# Patient Record
Sex: FEMALE | Race: OTHER | Employment: FULL TIME | URBAN - NONMETROPOLITAN AREA
[De-identification: names, ages, dates, MRNs, and addresses within clinical notes are randomized per-mention and may not be internally consistent; named-entity substitution may affect disease eponyms.]

---

## 2017-08-10 ENCOUNTER — OFFICE VISIT (OUTPATIENT)
Dept: NEUROLOGY | Age: 37
End: 2017-08-10
Payer: OTHER GOVERNMENT

## 2017-08-10 VITALS
HEART RATE: 73 BPM | OXYGEN SATURATION: 100 % | DIASTOLIC BLOOD PRESSURE: 80 MMHG | SYSTOLIC BLOOD PRESSURE: 114 MMHG | HEIGHT: 60 IN | WEIGHT: 131 LBS | BODY MASS INDEX: 25.72 KG/M2

## 2017-08-10 DIAGNOSIS — G43.019 INTRACTABLE MIGRAINE WITHOUT AURA AND WITHOUT STATUS MIGRAINOSUS: Primary | ICD-10-CM

## 2017-08-10 DIAGNOSIS — G43.719 CHRONIC MIGRAINE WITHOUT AURA, INTRACTABLE, WITHOUT STATUS MIGRAINOSUS: ICD-10-CM

## 2017-08-10 DIAGNOSIS — G47.61 PERIODIC LIMB MOVEMENT DISORDER (PLMD): ICD-10-CM

## 2017-08-10 DIAGNOSIS — R06.83 SNORING: ICD-10-CM

## 2017-08-10 PROCEDURE — 99204 OFFICE O/P NEW MOD 45 MIN: CPT | Performed by: PHYSICIAN ASSISTANT

## 2017-08-10 RX ORDER — TOPIRAMATE 100 MG/1
100 TABLET, FILM COATED ORAL 2 TIMES DAILY
Qty: 180 TABLET | Refills: 3 | Status: SHIPPED | OUTPATIENT
Start: 2017-08-10 | End: 2017-09-11 | Stop reason: DRUGHIGH

## 2017-08-10 RX ORDER — TOPIRAMATE 100 MG/1
100 TABLET, FILM COATED ORAL NIGHTLY
COMMUNITY
End: 2017-08-10 | Stop reason: DRUGHIGH

## 2017-08-10 RX ORDER — GLYCOPYRROLATE 2 MG/1
2 TABLET ORAL 2 TIMES DAILY
COMMUNITY

## 2017-08-10 RX ORDER — LORAZEPAM 1 MG/1
1 TABLET ORAL EVERY 8 HOURS PRN
COMMUNITY
End: 2018-09-12 | Stop reason: SDUPTHER

## 2017-08-10 RX ORDER — BUTALBITAL, ACETAMINOPHEN AND CAFFEINE 50; 325; 40 MG/1; MG/1; MG/1
1 TABLET ORAL PRN
COMMUNITY
End: 2018-03-01 | Stop reason: ALTCHOICE

## 2017-08-10 RX ORDER — FLUTICASONE PROPIONATE 50 MCG
2 SPRAY, SUSPENSION (ML) NASAL DAILY PRN
COMMUNITY
End: 2018-03-01 | Stop reason: ALTCHOICE

## 2017-08-10 RX ORDER — RIZATRIPTAN BENZOATE 10 MG/1
TABLET, ORALLY DISINTEGRATING ORAL
Qty: 9 TABLET | Refills: 3 | Status: SHIPPED | OUTPATIENT
Start: 2017-08-10 | End: 2017-11-17

## 2017-08-10 RX ORDER — NORGESTIMATE AND ETHINYL ESTRADIOL 0.25-0.035
1 KIT ORAL DAILY
COMMUNITY
End: 2017-08-10

## 2017-08-10 RX ORDER — LUBIPROSTONE 24 UG/1
24 CAPSULE, GELATIN COATED ORAL 2 TIMES DAILY
COMMUNITY
End: 2018-03-01 | Stop reason: ALTCHOICE

## 2017-08-10 RX ORDER — DESVENLAFAXINE 100 MG/1
TABLET, EXTENDED RELEASE ORAL DAILY
COMMUNITY
End: 2018-12-13 | Stop reason: SDUPTHER

## 2017-08-10 RX ORDER — ESOMEPRAZOLE MAGNESIUM 40 MG/1
40 FOR SUSPENSION ORAL DAILY
COMMUNITY
End: 2018-03-01 | Stop reason: ALTCHOICE

## 2017-08-10 RX ORDER — TRAZODONE HYDROCHLORIDE 100 MG/1
100 TABLET ORAL NIGHTLY
COMMUNITY
End: 2019-05-31 | Stop reason: SDUPTHER

## 2017-08-11 ENCOUNTER — TELEPHONE (OUTPATIENT)
Dept: NEUROLOGY | Age: 37
End: 2017-08-11

## 2017-08-14 ENCOUNTER — TELEPHONE (OUTPATIENT)
Dept: NEUROLOGY | Age: 37
End: 2017-08-14

## 2017-08-14 DIAGNOSIS — G43.019 INTRACTABLE MIGRAINE WITHOUT AURA AND WITHOUT STATUS MIGRAINOSUS: ICD-10-CM

## 2017-08-14 DIAGNOSIS — G47.61 PERIODIC LIMB MOVEMENT DISORDER (PLMD): ICD-10-CM

## 2017-08-14 LAB
ALBUMIN SERPL-MCNC: 3.9 G/DL (ref 3.5–5.2)
ALP BLD-CCNC: 104 U/L (ref 35–104)
ALT SERPL-CCNC: 22 U/L (ref 5–33)
ANION GAP SERPL CALCULATED.3IONS-SCNC: 10 MMOL/L (ref 7–19)
AST SERPL-CCNC: 20 U/L (ref 5–32)
BASOPHILS ABSOLUTE: 0 K/UL (ref 0–0.2)
BASOPHILS RELATIVE PERCENT: 0.5 % (ref 0–1)
BILIRUB SERPL-MCNC: <0.2 MG/DL (ref 0.2–1.2)
BUN BLDV-MCNC: 17 MG/DL (ref 6–20)
CALCIUM SERPL-MCNC: 9.2 MG/DL (ref 8.6–10)
CHLORIDE BLD-SCNC: 106 MMOL/L (ref 98–111)
CO2: 26 MMOL/L (ref 22–29)
CREAT SERPL-MCNC: 0.7 MG/DL (ref 0.5–0.9)
EOSINOPHILS ABSOLUTE: 0.2 K/UL (ref 0–0.6)
EOSINOPHILS RELATIVE PERCENT: 3.6 % (ref 0–5)
FOLATE: 8.8 NG/ML (ref 4.8–37.3)
GFR NON-AFRICAN AMERICAN: >60
GLUCOSE BLD-MCNC: 96 MG/DL (ref 74–109)
HCT VFR BLD CALC: 41.6 % (ref 37–47)
HEMOGLOBIN: 13.5 G/DL (ref 12–16)
LYMPHOCYTES ABSOLUTE: 2.4 K/UL (ref 1.1–4.5)
LYMPHOCYTES RELATIVE PERCENT: 41.9 % (ref 20–40)
MCH RBC QN AUTO: 30.5 PG (ref 27–31)
MCHC RBC AUTO-ENTMCNC: 32.5 G/DL (ref 33–37)
MCV RBC AUTO: 93.9 FL (ref 81–99)
MONOCYTES ABSOLUTE: 0.4 K/UL (ref 0–0.9)
MONOCYTES RELATIVE PERCENT: 6.2 % (ref 0–10)
NEUTROPHILS ABSOLUTE: 2.7 K/UL (ref 1.5–7.5)
NEUTROPHILS RELATIVE PERCENT: 47.6 % (ref 50–65)
PDW BLD-RTO: 12.9 % (ref 11.5–14.5)
PLATELET # BLD: 183 K/UL (ref 130–400)
PMV BLD AUTO: 11.4 FL (ref 9.4–12.3)
POTASSIUM SERPL-SCNC: 3.8 MMOL/L (ref 3.5–5)
RBC # BLD: 4.43 M/UL (ref 4.2–5.4)
SODIUM BLD-SCNC: 142 MMOL/L (ref 136–145)
T4 FREE: 1.1 NG/ML (ref 0.9–1.7)
TOTAL PROTEIN: 7.3 G/DL (ref 6.6–8.7)
TSH SERPL DL<=0.05 MIU/L-ACNC: 2.33 UIU/ML (ref 0.27–4.2)
VITAMIN B-12: 1203 PG/ML (ref 211–946)
WBC # BLD: 5.6 K/UL (ref 4.8–10.8)

## 2017-08-15 ENCOUNTER — TELEPHONE (OUTPATIENT)
Dept: NEUROLOGY | Age: 37
End: 2017-08-15

## 2017-08-17 ENCOUNTER — TELEPHONE (OUTPATIENT)
Dept: NEUROSURGERY | Age: 37
End: 2017-08-17

## 2017-09-05 ENCOUNTER — TELEPHONE (OUTPATIENT)
Dept: NEUROLOGY | Age: 37
End: 2017-09-05

## 2017-09-11 RX ORDER — TOPIRAMATE 100 MG/1
TABLET, FILM COATED ORAL
Qty: 270 TABLET | Refills: 2 | Status: SHIPPED | OUTPATIENT
Start: 2017-09-11 | End: 2018-03-01 | Stop reason: ALTCHOICE

## 2017-09-22 ENCOUNTER — TELEPHONE (OUTPATIENT)
Dept: NEUROSURGERY | Age: 37
End: 2017-09-22

## 2017-11-17 ENCOUNTER — OFFICE VISIT (OUTPATIENT)
Dept: NEUROLOGY | Age: 37
End: 2017-11-17
Payer: OTHER GOVERNMENT

## 2017-11-17 VITALS
WEIGHT: 130 LBS | SYSTOLIC BLOOD PRESSURE: 116 MMHG | HEIGHT: 60 IN | HEART RATE: 96 BPM | BODY MASS INDEX: 25.52 KG/M2 | DIASTOLIC BLOOD PRESSURE: 78 MMHG

## 2017-11-17 DIAGNOSIS — G47.61 PERIODIC LIMB MOVEMENT DISORDER (PLMD): ICD-10-CM

## 2017-11-17 DIAGNOSIS — R06.83 SNORING: ICD-10-CM

## 2017-11-17 DIAGNOSIS — G43.019 INTRACTABLE MIGRAINE WITHOUT AURA AND WITHOUT STATUS MIGRAINOSUS: Primary | ICD-10-CM

## 2017-11-17 PROCEDURE — 99214 OFFICE O/P EST MOD 30 MIN: CPT | Performed by: PHYSICIAN ASSISTANT

## 2017-11-17 RX ORDER — NITROFURANTOIN MACROCRYSTALS 50 MG/1
1 CAPSULE ORAL DAILY
COMMUNITY
Start: 2017-11-10 | End: 2018-03-01 | Stop reason: ALTCHOICE

## 2017-11-17 RX ORDER — PROMETHAZINE HYDROCHLORIDE 25 MG/1
25 TABLET ORAL EVERY 6 HOURS PRN
Qty: 25 TABLET | Refills: 1 | Status: SHIPPED | OUTPATIENT
Start: 2017-11-17 | End: 2017-11-24

## 2017-11-17 RX ORDER — TAMSULOSIN HYDROCHLORIDE 0.4 MG/1
1 CAPSULE ORAL DAILY
COMMUNITY
Start: 2017-10-11 | End: 2018-03-01 | Stop reason: ALTCHOICE

## 2017-11-17 RX ORDER — KETOROLAC TROMETHAMINE 10 MG/1
1 TABLET, FILM COATED ORAL 3 TIMES DAILY
COMMUNITY
Start: 2017-11-10 | End: 2018-03-01 | Stop reason: ALTCHOICE

## 2017-11-17 RX ORDER — CYPROHEPTADINE HYDROCHLORIDE 4 MG/1
TABLET ORAL
Qty: 60 TABLET | Refills: 2 | Status: SHIPPED | OUTPATIENT
Start: 2017-11-17 | End: 2018-03-01 | Stop reason: ALTCHOICE

## 2017-11-17 RX ORDER — ACETAMINOPHEN AND CODEINE PHOSPHATE 300; 30 MG/1; MG/1
1 TABLET ORAL PRN
COMMUNITY
Start: 2017-10-30 | End: 2018-09-24 | Stop reason: SDUPTHER

## 2017-11-17 RX ORDER — HYDROCODONE BITARTRATE AND ACETAMINOPHEN 5; 325 MG/1; MG/1
1 TABLET ORAL EVERY 4 HOURS
COMMUNITY
Start: 2017-11-08 | End: 2018-03-01 | Stop reason: ALTCHOICE

## 2017-11-17 RX ORDER — HYOSCYAMINE SULFATE 0.125 MG
1 TABLET ORAL EVERY 4 HOURS
COMMUNITY
Start: 2017-11-10 | End: 2018-03-01 | Stop reason: ALTCHOICE

## 2017-11-17 RX ORDER — RIZATRIPTAN BENZOATE 10 MG/1
TABLET ORAL
Qty: 9 TABLET | Refills: 2 | Status: SHIPPED | OUTPATIENT
Start: 2017-11-17 | End: 2019-02-14

## 2017-11-17 NOTE — PATIENT INSTRUCTIONS
Instructions:  Start Periactin 4 mg: take 1 at bedtime for 1-2 weeks then may increase to twice per day. May cause drowsiness. Taper off of Topamax 100 mg: take 1 twice per day for 1 week, then once per day for 1 week, then stop taking it. Patient education: Migraine headaches in adults     Written by the doctors and editors at [facility]   What are migraine headaches?  Migraine headaches or \"migraines\" are a kind of headache that can happen in adults and children. They are more common in women than in men. Migraines often start off mild and then get worse. What are the symptoms of migraines in adults?  Symptoms can include:  ?Headache  The headache gets worse over several hours and is usually throbbing. It often affects 1 side of the head. ?Nausea and sometimes vomiting  ? Feeling sensitive to light and noise  Lying down in a quiet, dark room often helps. ? Aura  Some people have something called a migraine \"aura. \" An aura is a symptom or feeling that happens before or during the migraine headache. Each person's aura is different, but in most cases the aura affects the vision. You might see flashing lights, bright spots, or zig-zag lines, or lose part of your vision. Or you might have numbness and tingling of the lips, lower face, and fingers of 1 hand. Some people hear sounds or have ringing in their ears as part of their aura. The aura usually lasts a few minutes to an hour and then goes away, but most often lasts 15 to 30 minutes. Women who get migraines with aura usually cannot take birth control pills. That's because they might increase the risk of stroke. Many people get other symptoms that happen several hours or even a day before the migraine headache. Doctors call these \"premonitory\" or \"prodromal\" symptoms. They might include yawning, feeling depressed, irritability, food cravings, constipation, or a stiff neck. Is there a test for migraines?   No. There is no test. But your doctor should be migraines, there are prescription medicines that can help. If you have severe nausea or vomiting with your migraines, there are medicines that can help with that, too. Do not try to treat frequent migraines on your own with non-prescription pain medicines. Taking non-prescription pain medicines too often can actually cause more headaches later. What if I want to get pregnant?  If you want to get pregnant, talk to your doctor or nurse about it before you start trying. Some medicines used to treat and prevent migraines are not safe during pregnancy, so you might need to switch medicines before you get pregnant. Some women notice that their migraines actually get better during pregnancy and breastfeeding. This is related to hormonal changes in the body.

## 2017-11-17 NOTE — PROGRESS NOTES
[facility] Neurology Follow Up Encounter      Information:   Patient Name: Robert Warner  :   1980  Age:   40 y.o. MRN:   [id number]  Account #:  [de-identified]  Today:              17    Provider:  Jenni Younger PA-C    Chief Complaint   Patient presents with    Migraine     Patient reports they are less frequent       Subjective:   Robert Warner is a 40 y.o. woman  with a history of migraines, snoring, and PLMD who comes in for a follow up. At her last office visit, 8/10/2017 she was referred for a brain MRI and labs. She was also prescribed Maxalt and Topamax 100 mg was titrated to bid. It was later increased to 1 q am and 2 q hs. The brain MRI revealed no brain abnormality. It did reveal a small mucous retention cyst/polyp in the right maxillary sinus. She is followed by an ENT and she uses Flonase. Today she reports that the migraines have improved greatly. She was diagnosed with kidney stones in October and had lithotripsy. The PLMD is stable with Trazodone.       Migraine:  Pain location:  Nasal bridge  Onset:  Early 's  Character: Throbbing  Radiates to: No  Severity:  5/10  Duration: 2.5 hours  Timing:  Decreased from 20/month to 1-2/month  Progression:  Worsening when BCP discontinued  Similar to prior headaches:  Yes  Associated:   Occasional nausea, photophobia, and phonophobia  Aggravated:  Menses, allergies, or heat   Alleviated:  Sleep and a dark room; Fioricet; cold gel mask;  Excedrin, Topamax or Maxalt  Diagnostic studies: CT head, 2017 reportedly normal; MRI brain normal  Treatments tried: Topamax, Fioricet, and Maxalt (kidney stones dx: 10/2017)      Objective:     Past Medical History:   Diagnosis Date    Anxiety     Bell's palsy     Depression     Headache     Insomnia     Irritable bowel syndrome     Kidney stone 10/2017    Periodic limb movement disorder (PLMD)     Snoring     PSG, 2016       Past Surgical History:   Procedure Laterality Date    ANKLE SURGERY Right desvenlafaxine succinate (PRISTIQ) 100 MG TB24 extended release tablet Take by mouth daily      traZODone (DESYREL) 100 MG tablet Take 100 mg by mouth nightly      butalbital-acetaminophen-caffeine (FIORICET, ESGIC) -40 MG per tablet Take 1 tablet by mouth as needed for Headaches      glycopyrrolate (ROBINUL) 2 MG tablet Take 2 mg by mouth 2 times daily       No current facility-administered medications for this visit. Allergies:   Allergies   Allergen Reactions    Doxycycline     Minocycline     Pcn [Penicillins]        REVIEW OF SYSTEMS     Constitutional: []Fever []Sweats [x]Chills [] Recent Injury   [] Denies all unless marked  HENT:[x]Headache  [] Head Injury  [] Sore Throat  [] Ear Pain  [] Dizziness [] Hearing Loss   [] Denies all unless marked  Spine:  [] Neck pain  [] Back pain  [] Sciaticia  [x] Denies all unless marked  Cardiovascular:[]Chest Pain []Palpitations [] Heart Disease  [x] Denies all unless marked  Pulmonary: []Shortness of Breath []Cough   [x] Denies all unless marked  Gastrointestinal:  [x]Abdominal Pain  []Blood in Stool  []Diarrhea [x]Constipation [x]Nausea  []Vomiting  [] Denies all unless marked  Genitourinary:  [x] Dysuria [x] Frequency  [] Incontinence [x] Urgency   [x] Denies all unless marked  Musculoskeletal: [] Arthralgia  [] Myalgias [] Muscle cramps  [] Muscle twitches   [x] Denies all unless marked   Extremities:   [] Pain   [] Swelling   [x] Denies all unless marked  Skin:[] Rash  [] Color Change  [x] Denies all unless marked  Neurological:[] Visual Disturbance [] Double Vision [] Slurred Speech [] Trouble swallowing  [] Vertigo [] Tingling [] Numbness [] Weakness [] Loss of Balance   [] Loss of Consciousness [] Memory Loss  [x] Denies all unless marked  Psychiatric/Behavioral:[x] Depression [x] Anxiety  [] Denies all unless marked  Sleep: [x]  Insomnia [] Sleep Disturbance [] Snoring [x] Restless Legs [] Daytime Sleepiness [] Sleep Apnea  [] Denies all unless promethazine (PHENERGAN) 25 MG tablet     Sig: Take 1 tablet by mouth every 6 hours as needed for Nausea     Dispense:  25 tablet     Refill:  1     1. The following educational material has been included in this visit after visit summary for your review: migraine-  Discussed with the patient and all questions fully answered. 2.  We had a discussion about the clinical issues and went over the various important aspects to consider. Advised to taper off Topamax with her recent history of kidney stones and with start Periactin. Explained that there are limited migraine prophlyactics for her due to her current medication list and her history of hypotension. (interactions/side effects). Treatment plan discussed. Discussed use, benefit, and SE of prescribed medication. All questions were answered. Pt voiced understanding and agrees with treatment plan. 3.  Taper Topamax-directions provided  4. Start Periactin 4 mg bid but start 1 q hs x 1 week then 1 bid-sent to [facility]  5. Start Phenergan-sent to [facility]  6. Refill Maxalt-sent to [facility]  7. Follow up in 2 months    Note:  A total of >50% (>13 minutes) of 25 minutes was spent discussing the pathophysiology and treatment and/or coordination of care of the above diagnoses.

## 2018-03-01 ENCOUNTER — OFFICE VISIT (OUTPATIENT)
Dept: PRIMARY CARE CLINIC | Age: 38
End: 2018-03-01
Payer: OTHER GOVERNMENT

## 2018-03-01 VITALS
WEIGHT: 135 LBS | RESPIRATION RATE: 18 BRPM | BODY MASS INDEX: 26.5 KG/M2 | OXYGEN SATURATION: 97 % | HEART RATE: 81 BPM | DIASTOLIC BLOOD PRESSURE: 78 MMHG | SYSTOLIC BLOOD PRESSURE: 120 MMHG | TEMPERATURE: 97.8 F | HEIGHT: 60 IN

## 2018-03-01 DIAGNOSIS — F41.9 ANXIETY: ICD-10-CM

## 2018-03-01 DIAGNOSIS — F33.1 MODERATE EPISODE OF RECURRENT MAJOR DEPRESSIVE DISORDER (HCC): ICD-10-CM

## 2018-03-01 DIAGNOSIS — K58.1 IRRITABLE BOWEL SYNDROME WITH CONSTIPATION: ICD-10-CM

## 2018-03-01 DIAGNOSIS — G43.719 CHRONIC MIGRAINE WITHOUT AURA, INTRACTABLE, WITHOUT STATUS MIGRAINOSUS: Primary | ICD-10-CM

## 2018-03-01 PROCEDURE — 99214 OFFICE O/P EST MOD 30 MIN: CPT | Performed by: FAMILY MEDICINE

## 2018-03-01 RX ORDER — OMEPRAZOLE 40 MG/1
40 CAPSULE, DELAYED RELEASE ORAL DAILY
COMMUNITY

## 2018-03-01 RX ORDER — POLYETHYLENE GLYCOL 3350 17 G/17G
17 POWDER, FOR SOLUTION ORAL DAILY
COMMUNITY
End: 2019-09-09

## 2018-03-01 ASSESSMENT — PATIENT HEALTH QUESTIONNAIRE - PHQ9
1. LITTLE INTEREST OR PLEASURE IN DOING THINGS: 0
2. FEELING DOWN, DEPRESSED OR HOPELESS: 0
SUM OF ALL RESPONSES TO PHQ QUESTIONS 1-9: 0
SUM OF ALL RESPONSES TO PHQ9 QUESTIONS 1 & 2: 0

## 2018-03-02 ASSESSMENT — ENCOUNTER SYMPTOMS
VOMITING: 0
EYE DISCHARGE: 0
COLOR CHANGE: 0
DIARRHEA: 0
BACK PAIN: 0
COUGH: 0
NAUSEA: 0
ABDOMINAL PAIN: 0
WHEEZING: 0

## 2018-03-29 ENCOUNTER — TELEPHONE (OUTPATIENT)
Dept: PRIMARY CARE CLINIC | Age: 38
End: 2018-03-29

## 2018-03-29 DIAGNOSIS — M25.512 CHRONIC LEFT SHOULDER PAIN: Primary | ICD-10-CM

## 2018-03-29 DIAGNOSIS — G89.29 CHRONIC LEFT SHOULDER PAIN: Primary | ICD-10-CM

## 2018-04-03 ENCOUNTER — OFFICE VISIT (OUTPATIENT)
Dept: PRIMARY CARE CLINIC | Age: 38
End: 2018-04-03
Payer: OTHER GOVERNMENT

## 2018-04-03 VITALS
BODY MASS INDEX: 26.31 KG/M2 | TEMPERATURE: 98.5 F | DIASTOLIC BLOOD PRESSURE: 80 MMHG | SYSTOLIC BLOOD PRESSURE: 111 MMHG | OXYGEN SATURATION: 98 % | HEIGHT: 60 IN | HEART RATE: 87 BPM | WEIGHT: 134 LBS

## 2018-04-03 DIAGNOSIS — R25.1 SHAKINESS: Primary | ICD-10-CM

## 2018-04-03 DIAGNOSIS — R42 DIZZINESS: ICD-10-CM

## 2018-04-03 DIAGNOSIS — R73.9 ELEVATED BLOOD SUGAR: ICD-10-CM

## 2018-04-03 LAB — GLUCOSE BLD-MCNC: 98 MG/DL

## 2018-04-03 PROCEDURE — 99214 OFFICE O/P EST MOD 30 MIN: CPT | Performed by: FAMILY MEDICINE

## 2018-04-03 PROCEDURE — 82962 GLUCOSE BLOOD TEST: CPT | Performed by: FAMILY MEDICINE

## 2018-04-03 ASSESSMENT — ENCOUNTER SYMPTOMS
COLOR CHANGE: 0
NAUSEA: 0
COUGH: 0
VOMITING: 0
WHEEZING: 0
EYE DISCHARGE: 0
DIARRHEA: 0
ABDOMINAL PAIN: 0
BACK PAIN: 0

## 2018-04-11 DIAGNOSIS — R73.9 ELEVATED BLOOD SUGAR: ICD-10-CM

## 2018-04-11 DIAGNOSIS — R25.1 SHAKINESS: ICD-10-CM

## 2018-04-11 DIAGNOSIS — R42 DIZZINESS: ICD-10-CM

## 2018-05-24 ENCOUNTER — OFFICE VISIT (OUTPATIENT)
Dept: NEUROLOGY | Age: 38
End: 2018-05-24
Payer: OTHER GOVERNMENT

## 2018-05-24 VITALS
DIASTOLIC BLOOD PRESSURE: 86 MMHG | BODY MASS INDEX: 24.35 KG/M2 | SYSTOLIC BLOOD PRESSURE: 117 MMHG | HEART RATE: 85 BPM | OXYGEN SATURATION: 99 % | HEIGHT: 60 IN | WEIGHT: 124 LBS

## 2018-05-24 DIAGNOSIS — G43.019 INTRACTABLE MIGRAINE WITHOUT AURA AND WITHOUT STATUS MIGRAINOSUS: Primary | ICD-10-CM

## 2018-05-24 PROCEDURE — 99213 OFFICE O/P EST LOW 20 MIN: CPT | Performed by: PHYSICIAN ASSISTANT

## 2018-05-24 RX ORDER — ELETRIPTAN HYDROBROMIDE 40 MG/1
TABLET, FILM COATED ORAL
Qty: 12 TABLET | Refills: 5 | Status: SHIPPED | OUTPATIENT
Start: 2018-05-24 | End: 2019-06-04 | Stop reason: SDUPTHER

## 2018-05-24 RX ORDER — CYPROHEPTADINE HYDROCHLORIDE 4 MG/1
TABLET ORAL
Qty: 60 TABLET | Refills: 5 | Status: SHIPPED | OUTPATIENT
Start: 2018-05-24 | End: 2019-02-14

## 2018-05-31 ENCOUNTER — TELEPHONE (OUTPATIENT)
Dept: NEUROLOGY | Age: 38
End: 2018-05-31

## 2018-06-19 DIAGNOSIS — L70.8 OTHER ACNE: Primary | ICD-10-CM

## 2018-07-05 ENCOUNTER — TELEPHONE (OUTPATIENT)
Dept: PRIMARY CARE CLINIC | Age: 38
End: 2018-07-05

## 2018-07-05 RX ORDER — VALACYCLOVIR HYDROCHLORIDE 1 G/1
TABLET, FILM COATED ORAL
Qty: 8 TABLET | Refills: 0 | Status: SHIPPED | OUTPATIENT
Start: 2018-07-05 | End: 2018-08-08 | Stop reason: SDUPTHER

## 2018-08-08 RX ORDER — VALACYCLOVIR HYDROCHLORIDE 1 G/1
TABLET, FILM COATED ORAL
Qty: 8 TABLET | Refills: 3 | Status: SHIPPED | OUTPATIENT
Start: 2018-08-08 | End: 2019-07-30 | Stop reason: SDUPTHER

## 2018-08-23 NOTE — PROGRESS NOTES
[facility] Neurology Follow Up Encounter      Information:   Patient Name: Alessandra Spears  :   1980  Age:   45 y.o. MRN:   [id number]  Account #:  [de-identified]  Date:              18    Provider:  Karen Ortega PA-C    Chief Complaint   Patient presents with    Follow-up     3 month f/u, migraines, pt states fdor the past week she has had a headache almost every day       Subjective:   Alessandra Spaers is a 45 y.o. female  with a history of migraines, snoring, and PLMD who comes in for a follow up. At her last office visit, 18 she was to start Periactin and Relpax. Today she reports that she can't tolerate the drowsiness side effects of Periactin. Relpax does alleviate the migraine but sometimes she has to take 2. This last month she has had an increase in migraine frequency. She has fullness in her left ear with otalgia x 1 day. No fever or chills. The PLMD is stable with trazodone. Migraine: (review, 18)  Pain location:  Nasal bridge/frontal  Onset:  Early 's  Character:  Throbbing  Radiates to: No  Severity:  5/10  Duration: 2.5 hours to 1 day  Timing:  Decreased from 20/month to 14/month this last month but a lot less prior to this last month  Progression:  Worsening when BCP discontinued; she started progesterone  Similar to prior headaches:  Yes  Associated:   Nausea, photophobia, and phonophobia  Aggravated:  Menses, allergies, or heat   Treatments tried:  Sleep and a dark room; Fioricet; cold gel mask;  Excedrin, Topamax, Maxalt, Relpax, Periactin  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      Objective:     Past Medical History:   Diagnosis Date    Anxiety     Bell's palsy     Chronic migraine without aura, intractable, without status migrainosus 8/10/2017    Depression     Headache     Insomnia     Intractable migraine without aura and without status migrainosus 8/10/2017    Irritable bowel syndrome     Kidney stone 10/2017    Osteoarthritis     Periodic limb movement disorder (PLMD)     Snoring     PSG, 7/2016-mild       Past Surgical History:   Procedure Laterality Date    ANKLE SURGERY Right     CYSTOURETHROSCOPY/STENT REMOVAL      TUBAL LIGATION      URETER STENT PLACEMENT      X 2       Recent Hospitalizations  ·     Significant Injuries  ·     History reviewed. No pertinent family history. Social History     Social History    Marital status:      Spouse name: N/A    Number of children: N/A    Years of education: N/A     Occupational History    Not on file. Social History Main Topics    Smoking status: Never Smoker    Smokeless tobacco: Never Used    Alcohol use No    Drug use: No    Sexual activity: Not on file     Other Topics Concern    Not on file     Social History Narrative    No narrative on file       Medications:  Current Outpatient Prescriptions   Medication Sig Dispense Refill    spironolactone (ALDACTONE) 50 MG tablet Take 50 mg by mouth daily      neomycin-polymyxin-hydrocortisone 1 % SOLN otic solution Place 2 drops into both ears every 8 hours for 10 days 1 Bottle 1    valACYclovir (VALTREX) 1 g tablet TAKE TWO TABLETS BY MOUTH NOW AND REPEAT 2 IN 24 HOURS 8 tablet 3    progesterone (FIRST-PROGESTERONE) suppository Place 50 mg vaginally daily      cyproheptadine (PERIACTIN) 4 MG tablet 1 q hs x 1 week can increase to bid 60 tablet 5    eletriptan (RELPAX) 40 MG tablet As directed 12 tablet 5    polyethylene glycol (GLYCOLAX) packet Take 17 g by mouth daily      omeprazole (PRILOSEC) 40 MG delayed release capsule Take 40 mg by mouth 2 times daily      acetaminophen-codeine (TYLENOL #3) 300-30 MG per tablet Take 1 tablet by mouth as needed .  rizatriptan (MAXALT) 10 MG tablet As directed 9 tablet 2    LORazepam (ATIVAN) 1 MG tablet Take 1 mg by mouth every 8 hours as needed for Anxiety .       desvenlafaxine succinate (PRISTIQ) 100 ALT 22 08/14/2017    LABGLOM >60 08/14/2017           Assessment:       ICD-10-CM ICD-9-CM    1. Intractable migraine without aura and without status migrainosus G43.019 346.11    2. Acute otitis externa of left ear, unspecified type H60.502 380.10              []  :  Stable        []  :  Improved                          []  :  Well controlled                 []  :  Resolving        []  :  Resolved        [x]  :  Inadequately controlled        []  :  Worsening        []  :  Additional workup planned      Plan:   No orders of the defined types were placed in this encounter. Orders Placed This Encounter   Medications    neomycin-polymyxin-hydrocortisone 1 % SOLN otic solution     Sig: Place 2 drops into both ears every 8 hours for 10 days     Dispense:  1 Bottle     Refill:  1       1. The following educational material has been included in this visit after visit summary for your review: migraines/Aimovig-  Discussed with the patient and all questions fully answered. 2.  We had a discussion about the clinical issues and went over the various important aspects to consider. Treatment plan discussed. Discussed use, benefit, and SE of prescribed medication. All questions were answered. Pt voiced understanding and agrees with treatment plan. 3.  Start Aimovig-literature provided and form signed  4. Continue Relpax   5. Start cortisporin otic-sent to pharmacy  6. Follow up ENT if symptoms persist or worsen  7. Follow up in 5 months    Note:  A total of >50% (>8 minutes) of 15 minutes was spent discussing the pathophysiology and treatment and/or coordination of care of the above diagnoses.

## 2018-08-24 ENCOUNTER — OFFICE VISIT (OUTPATIENT)
Dept: NEUROLOGY | Age: 38
End: 2018-08-24
Payer: OTHER GOVERNMENT

## 2018-08-24 VITALS
SYSTOLIC BLOOD PRESSURE: 109 MMHG | WEIGHT: 138 LBS | HEART RATE: 75 BPM | DIASTOLIC BLOOD PRESSURE: 79 MMHG | HEIGHT: 60 IN | OXYGEN SATURATION: 97 % | BODY MASS INDEX: 27.09 KG/M2

## 2018-08-24 DIAGNOSIS — H60.502 ACUTE OTITIS EXTERNA OF LEFT EAR, UNSPECIFIED TYPE: ICD-10-CM

## 2018-08-24 DIAGNOSIS — G43.019 INTRACTABLE MIGRAINE WITHOUT AURA AND WITHOUT STATUS MIGRAINOSUS: Primary | ICD-10-CM

## 2018-08-24 PROCEDURE — 99213 OFFICE O/P EST LOW 20 MIN: CPT | Performed by: PHYSICIAN ASSISTANT

## 2018-08-24 RX ORDER — SPIRONOLACTONE 50 MG/1
50 TABLET, FILM COATED ORAL DAILY
COMMUNITY
End: 2019-02-14

## 2018-08-24 RX ORDER — NEOMYCIN SULFATE, POLYMYXIN B SULFATE, HYDROCORTISONE 3.5; 10000; 1 MG/ML; [USP'U]/ML; MG/ML
2 SOLUTION/ DROPS AURICULAR (OTIC) EVERY 8 HOURS SCHEDULED
Qty: 1 BOTTLE | Refills: 1 | Status: SHIPPED | OUTPATIENT
Start: 2018-08-24 | End: 2018-09-03

## 2018-08-24 NOTE — PATIENT INSTRUCTIONS
Patient education: Migraine headaches in adults       MIGRAINE HEADACHE OVERVIEW  Headaches can be quite debilitating, although the vast majority are not due to life-threatening disorders. Approximately 90 percent of headaches are caused by one of three syndromes:   ?Migraine headache  ? Tension-type headaches  ? Cluster headaches  This article discusses migraine headaches in adults. MIGRAINE HEADACHE SYMPTOMS  Between 12 and 16 percent of people in the [place] experience migraine headaches, making it the second most common type of headache. Pain  The pain of a migraine headache usually begins gradually, intensifies over minutes to one or more hours, and resolves gradually at the end of the attack. The headache is typically dull, deep, and steady when mild to moderate in severity; it becomes throbbing or pulsatile when severe. Migraine headaches are worsened by light, sneezing, straining, constant motion, moving the head rapidly, or physical activity. Many migraine sufferers try to get relief by lying down in a darkened, quiet room. In 60 to 70 percent of people, the pain occurs on only one side of the head. In adults, a migraine headache usually lasts a few hours, although it can last from four to 72 hours. Other symptoms  Migraine headaches are often accompanied by nausea and vomiting, as well as sensitivity to light and noise. Between 10 and 20 percent of people with migraines also experience nasal stuffiness and runny nose, or teary eyes. The symptoms of a migraine attack may be severe and alarming, but in most cases there are no lasting health effects when the attack ends. Aura  About 20 percent of people with migraines experience symptoms before the headache; this is called an aura. The aura may include flashing lights or bright spots, zigzag lines, changes in vision, or numbness or tingling in the fingers of one hand, lips, tongue, or lower face.  You may have one or more of these aura symptoms. Auras may also involve other senses and can occasionally cause temporary muscle weakness or changes in speech; these symptoms can be frightening. Aura symptoms typically last five to 20 minutes and rarely last more than 60 minutes. The headache occurs soon after the aura stops. Muscle-related auras may last longer. MIGRAINE HEADACHE TRIGGERS  Migraines can be triggered by stress, worry, menstrual periods, birth control pills, physical exertion, fatigue, lack of sleep, hunger, head trauma, and certain foods or drinks that contain chemicals such as nitrites, glutamate, aspartate, tyramine. Certain medications and chemicals can also trigger a migraine, including nitroglycerin (used to treat chest pain), estrogens, hydralazine (used to treat high blood pressure), perfumes, smoke, and organic solvents with a strong odor. Headache diary  People who have frequent or severe headaches may benefit from keeping a headache diary over the course of one month. This can be used to determine what triggers the migraines and what makes them better. MIGRAINE HEADACHE TREATMENT TYPES  Migraine headache treatment depends upon the frequency, severity, and symptoms of your headache. ? Acute treatment refers to medicines you can take when you have a headache to relieve the pain immediately. ?Preventive treatment refers to medicines you can take on a regular (usually daily) basis to prevent headaches in the future. Acute treatment  The pain of migraines can be tough to get rid of. Treatment is most likely to work if you take it at the first sign of an attack (eg, at the first sign of aura if one occurs, or when pain begins). In some people, an aura occurs before the migraine (see 'Aura' above). Therefore, an aura can serve as a reliable warning that a migraine headache is on the way, and should be the signal to take migraine medication. (See \"Acute treatment of migraine in adults\". )  Pain relievers  Mild migraine attacks may respond to pain relievers, some of which are available without a prescription. These drugs include:  ? Aspirin  ? Acetaminophen  ? Nonsteroidal anti-inflammatory drugs (NSAIDs) such as ibuprofen, indomethacin, or naproxen  ? Indomethacin is a prescription medicine that comes in a rectal suppository, which may be useful for people who have nausea during their headaches. Pain relievers are also available in combination with caffeine, which enhances their antimigraine effect. As an example, some pain relievers contain a combination of acetaminophen, aspirin, and caffeine. Pain relievers are often recommended first for mild to moderate migraine attacks. However, they should not be used too often because overuse can lead to medication-overuse headaches or chronic daily headaches. If you respond to a pain reliever, continue taking it with each attack, as long as you do not take it more than once or twice per week. People with gastritis (inflammation of the stomach), ulcers, kidney disease, and bleeding conditions should not take products containing aspirin or NSAIDs. Anti-nausea medications  If you have nausea and vomiting with a migraine, you can take an anti-nausea medicine by injection or rectal suppository. In some cases, antimigraine drugs can be taken in combination with drugs that alleviate nausea and vomiting, such as metoclopramide or prochlorperazine. Anti-nausea medications given by mouth are usually used in combination with other medications to treat acute migraine. However, anti-nausea medications can be given alone in the hospital by intravenous and intramuscular administration to treat acute migraine headache. Triptans  If a pain reliever does not control your migraine pain, most healthcare providers will recommend a treatment that is migraine-specific. This includes a class of medications called triptans.  Examples of triptans are sumatriptan, zolmitriptan, naratriptan, rizatriptan, almotriptan, eletriptan, and frovatriptan. Triptans can be used at home or work/school, and are all available in an oral (pill) form. Sumatriptan and zolmitriptan are available as nasal sprays, and sumatriptan is available as an injection. People with familial hemiplegic migraine, basilar migraine, uncontrolled high blood pressure, vascular disease (including ischemic stroke and coronary artery disease), Prinzmetal's angina, pregnancy, and severe kidney or liver disease should not take triptans in most cases. ?Sumatriptan  Sumatriptan is available in many different formulas, including a tablet, nasal spray, and injection. Over 70 percent of people get pain relief within one hour of injecting sumatriptan; by two hours, 90 percent of people notice improvement. Your healthcare provider can help to decide which formula (pill, nasal spray, or shot) is best for you. Common side effects of injectable sumatriptan include pain at the injection site, dizziness, a feeling of warmth, and tingling in the arms or legs. Most of these reactions occur soon after the injection and resolve within 30 minutes. These drugs are safe for most patients. Sumatriptan nasal spray begins to work faster than the pill form and has fewer side effects than the injection. The most common side effect of the nasal spray is an unpleasant taste. A tablet that contains a combination of sumatriptan-naproxen appears to be more effective than each medication taken alone. It is not known if taking the two medications separately would be as effective as the combination tablet. Ergots  Ergotamine is an older, migraine-specific drug. It is often combined with caffeine. Ergots are not usually as effective as triptans and are more likely to cause side effects. Ergots are sometimes recommended for people with migraines of a long duration (greater than 48 hours) or that frequently recur.  People with high blood pressure, coronary artery disease, or kidney or liver disease should not use ergotamines. Dihydroergotamine is related to ergotamine, and can be taken by nasal spray for mild or moderate migraine attacks. It can also be given by injection for severe attacks. Other medications  Other medications for migraine are not as well studied or are less effective. A small percentage of people with migraine headaches do not respond to routine acute treatments and may require additional treatment for pain. Dexamethasone is a glucocorticoid (steroid) medication that can be given by injection, along with another acute migraine treatment, to reduce the risk of a migraine coming back. Dexamethasone injections may be given in an emergency department or clinic. Preventive treatment  Preventive treatment effectively controls migraine headaches in most people, although the benefits of this treatment may not be evident for three to four weeks. In some cases, both acute treatment and preventive treatment are necessary to adequately control migraines. Beta blockers  Beta blockers were originally developed to treat high blood pressure. In addition, beta blockers reduce the frequency of migraine attacks in 60 to 80 percent of people. Commonly used beta blockers include propranolol, nadolol, atenolol, and metoprolol. Beta blockers may cause depression in some people or impotence in some men. Antidepressant medications  Tricyclic antidepressants (TCAs) and certain other antidepressant medications are often recommended for migraine prevention. These include amitriptyline, nortriptyline, and doxepin. Of these, amitriptyline has proven benefit for migraine prevention, while there is less data for the other tricyclics. Side effects are common with tricyclic antidepressants. Most of these drugs cause drowsiness, particularly amitriptyline and doxepin. Therefore, these drugs are usually taken at bedtime and started at a low dose.  Additional side effects of tricyclics can include Migraines occur about three times more commonly in women than in men. Estrogen has a variable effect on the frequency and severity of a woman's migraines; some women who take birth control pills (which contain estrogen) or hormone replacement therapy experience worsening headaches, while others improve. Similarly, some women have more frequent or severe headaches during pregnancy while others have improvement. Menstrual migraines are migraine headaches that occur around the beginning of a woman's menstrual period (usually two days before to three days after the period begins). Women with menstrual migraine may also have migraines at other times during the month. Most often, there is no migraine aura associated with menstrual migraines, even if the woman usually has aura at other times. Menstrual migraines are thought to be triggered by the normal decrease in estrogen levels that occurs before the menstrual period begins. Menstrual migraines tend to be longer lasting, more severe, and more resistant to treatment than other types of migraine. Treatment  Initial treatment of acute menstrual migraine is the same as treatment for migraine occurring at any other time. Preventive therapies for menstrual migraine can be either nonspecific (those that do not address the hormonal trigger) or specific (hormone-based treatments). With nonspecific strategies, success requires accurate anticipation of menses for scheduling interventions; therefore, women with irregular cycles are not good candidates for these options. Coexisting problems, such as dysmenorrhea, menorrhagia, endometriosis, as well as contraception needs may influence choice of preventive therapy. A preventive treatment may be useful for women who have menstrual migraines on a predictable schedule. This treatment strategy is called \"mini-prophylaxis\".   ?Nonsteroidal antiinflammatory drugs (NSAIDs) such as ibuprofen or naproxen are one option for mini-prophylaxis of menstrual migraine. ?Triptans such as frovatriptan, sumatriptan, or naratriptan are another option. Typically, long-acting triptans are dosed twice daily beginning two days before anticipated menses and continued for five days. Hormonal treatments may be recommended to prevent menstrual migraines. One approach is to use estrogen-progestin contraceptive pills in an extended cycle; another choice is menstrually-targeted supplemental estrogen. These treatments work by preventing a rapid decline in the level of estrogen in the body before the menstrual period, which is believed to trigger the migraine. However, some experts avoid treatment with estrogen-progestin contraceptives for women who have a menstrual migraine with aura. Others consider the use of such treatment only for healthy women younger than age 28 who do not have focal neurologic signs and who do not smoke. WHERE TO GET MORE INFORMATION  Your healthcare provider is the best source of information for questions and concerns related to your medical problem    Organizations  National Head[employer] Foundation  Non-profit organization dedicated to service headache sufferers, their families, and the healthcare practitioners who treat them. Promotes research into headache causes and treatments and educates the public. [address]   Javier@[email or web address]. org   [employer]. Augustin Syed   Tel: [phone])   Fax: [phone]   [address]  Assists migraine sufferers by providing information and support and by raising money to fund innovative research into its causes and better treatments. [address] [facility] [email or web address] [email or web address]   [email or web address]. org   Tel: [phone]   Fax: [phone] Headache Society Committee for Headache Education (ACHE)  The American Headache Society Committee for pain, take it as prescribed. ¨ If you are not taking a prescription pain medicine, ask your doctor if you can take an over-the-counter medicine. Inserting ear drops  · Warm the drops to body temperature by rolling the container in your hands. Or you can place it in a cup of warm water for a few minutes. · Lie down, with your ear facing up. · Place drops inside the ear. Follow your doctor's instructions (or the directions on the label) for how many drops to use. Gently wiggle the outer ear or pull the ear up and back to help the drops get into the ear. · It's important to keep the liquid in the ear canal for 3 to 5 minutes. When should you call for help? Call your doctor now or seek immediate medical care if:    · You have a new or higher fever.     · You have new or worse pain, swelling, warmth, or redness around or behind your ear.     · You have new or increasing pus or blood draining from your ear.    Watch closely for changes in your health, and be sure to contact your doctor if:    · You are not getting better after 2 days (48 hours). Where can you learn more? Go to [email or web address] and sign in to your Zong account. Enter C706 in the Pointstic box to learn more about \"Swimmer's Ear: Care Instructions. \"     If you do not have an account, please click on the \"Sign Up Now\" link. Current as of: May 12, 2017  Content Version: 11.7  © 5392-6070 [employer]. Care instructions adapted under license by [facility] ([facility]). If you have questions about a medical condition or this instruction, always ask your healthcare professional. [place] [address] any warranty or liability for your use of this information.

## 2018-08-24 NOTE — PROGRESS NOTES
REVIEW OF SYSTEMS    Constitutional: []Fever []Sweats []Chills [] Recent Injury   [x] Denies all unless marked  HENT:[x]Headache  [] Head Injury  [] Sore Throat  [x] Ear Pain  [x] Dizziness [] Hearing Loss   [x] Denies all unless marked  Spine:  [] Neck pain  [] Back pain  [] Sciaticia  [x] Denies all unless marked  Cardiovascular:[]Chest Pain []Palpitations [] Heart Disease  [x] Denies all unless marked  Pulmonary: []Shortness of Breath []Cough   [x] Denies all unless marked  Gastrointestinal:  [x]Abdominal Pain  []Blood in Stool  [x]Diarrhea [x]Constipation [x]Nausea  [x]Vomiting  [] Denies all unless marked  Genitourinary:  [] Dysuria [] Frequency  [] Incontinence [] Urgency   [x] Denies all unless marked  Musculoskeletal: [x] Arthralgia  [] Myalgias [] Muscle cramps  [] Muscle twitches   [] Denies all unless marked   Extremities:   [] Pain   [] Swelling   [x] Denies all unless marked  Skin:[] Rash  [] Color Change  [x] Denies all unless marked  Neurological:[] Visual Disturbance [] Double Vision [] Slurred Speech [] Trouble swallowing  [x] Vertigo [] Tingling [] Numbness [] Weakness [] Loss of Balance   [] Loss of Consciousness [] Memory Loss [] Seizures  [x] Denies all unless marked  Psychiatric/Behavioral:[x] Depression [x] Anxiety  [x] Denies all unless marked  Sleep: [x]  Insomnia [] Sleep Disturbance [] Snoring [x] Restless Legs [] Daytime Sleepiness [] Sleep Apnea  [] Denies all unless marked

## 2018-09-04 ENCOUNTER — OFFICE VISIT (OUTPATIENT)
Dept: PRIMARY CARE CLINIC | Age: 38
End: 2018-09-04
Payer: OTHER GOVERNMENT

## 2018-09-04 VITALS
HEART RATE: 97 BPM | BODY MASS INDEX: 26.7 KG/M2 | RESPIRATION RATE: 18 BRPM | OXYGEN SATURATION: 98 % | WEIGHT: 136 LBS | DIASTOLIC BLOOD PRESSURE: 74 MMHG | TEMPERATURE: 98.4 F | HEIGHT: 60 IN | SYSTOLIC BLOOD PRESSURE: 123 MMHG

## 2018-09-04 DIAGNOSIS — G43.019 INTRACTABLE MIGRAINE WITHOUT AURA AND WITHOUT STATUS MIGRAINOSUS: Primary | ICD-10-CM

## 2018-09-04 PROCEDURE — 99213 OFFICE O/P EST LOW 20 MIN: CPT | Performed by: FAMILY MEDICINE

## 2018-09-05 ASSESSMENT — ENCOUNTER SYMPTOMS
EYE DISCHARGE: 0
COUGH: 0
BACK PAIN: 0
WHEEZING: 0
ABDOMINAL PAIN: 0
VOMITING: 0
DIARRHEA: 0
NAUSEA: 0
COLOR CHANGE: 0

## 2018-09-12 DIAGNOSIS — F41.9 ANXIETY: Primary | ICD-10-CM

## 2018-09-13 RX ORDER — LORAZEPAM 1 MG/1
1 TABLET ORAL EVERY 8 HOURS PRN
Qty: 90 TABLET | Refills: 2 | Status: SHIPPED | OUTPATIENT
Start: 2018-09-13 | End: 2018-10-13

## 2018-09-24 ENCOUNTER — OFFICE VISIT (OUTPATIENT)
Dept: PRIMARY CARE CLINIC | Age: 38
End: 2018-09-24
Payer: OTHER GOVERNMENT

## 2018-09-24 VITALS
WEIGHT: 131 LBS | SYSTOLIC BLOOD PRESSURE: 98 MMHG | BODY MASS INDEX: 25.72 KG/M2 | TEMPERATURE: 96.6 F | HEART RATE: 90 BPM | DIASTOLIC BLOOD PRESSURE: 78 MMHG | HEIGHT: 60 IN | OXYGEN SATURATION: 98 %

## 2018-09-24 DIAGNOSIS — Z01.419 WELL WOMAN EXAM WITH ROUTINE GYNECOLOGICAL EXAM: Primary | ICD-10-CM

## 2018-09-24 DIAGNOSIS — Z87.442 H/O RENAL CALCULI: ICD-10-CM

## 2018-09-24 DIAGNOSIS — M54.50 ACUTE LEFT-SIDED LOW BACK PAIN WITHOUT SCIATICA: ICD-10-CM

## 2018-09-24 LAB
ANION GAP SERPL CALCULATED.3IONS-SCNC: 12 MMOL/L (ref 7–19)
APPEARANCE FLUID: NORMAL
BILIRUBIN, POC: NORMAL
BLOOD URINE, POC: NORMAL
BUN BLDV-MCNC: 17 MG/DL (ref 6–20)
CALCIUM SERPL-MCNC: 9.5 MG/DL (ref 8.6–10)
CHLORIDE BLD-SCNC: 102 MMOL/L (ref 98–111)
CLARITY, POC: CLEAR
CO2: 25 MMOL/L (ref 22–29)
COLOR, POC: YELLOW
CREAT SERPL-MCNC: 0.5 MG/DL (ref 0.5–0.9)
GFR NON-AFRICAN AMERICAN: >60
GLUCOSE BLD-MCNC: 89 MG/DL (ref 74–109)
GLUCOSE URINE, POC: NORMAL
KETONES, POC: NORMAL
LEUKOCYTE EST, POC: NORMAL
NITRITE, POC: NORMAL
PH, POC: 5
POTASSIUM SERPL-SCNC: 4.3 MMOL/L (ref 3.5–5)
PROTEIN, POC: NORMAL
SODIUM BLD-SCNC: 139 MMOL/L (ref 136–145)
SPECIFIC GRAVITY, POC: 1.02
UROBILINOGEN, POC: 0.2

## 2018-09-24 PROCEDURE — 99395 PREV VISIT EST AGE 18-39: CPT | Performed by: FAMILY MEDICINE

## 2018-09-24 PROCEDURE — 81002 URINALYSIS NONAUTO W/O SCOPE: CPT | Performed by: FAMILY MEDICINE

## 2018-09-24 PROCEDURE — 36415 COLL VENOUS BLD VENIPUNCTURE: CPT | Performed by: FAMILY MEDICINE

## 2018-09-24 RX ORDER — ACETAMINOPHEN AND CODEINE PHOSPHATE 300; 30 MG/1; MG/1
1 TABLET ORAL EVERY 6 HOURS PRN
Qty: 30 TABLET | Refills: 0 | Status: SHIPPED | OUTPATIENT
Start: 2018-09-24 | End: 2018-10-24

## 2018-09-28 ENCOUNTER — TELEPHONE (OUTPATIENT)
Dept: NEUROLOGY | Age: 38
End: 2018-09-28

## 2018-10-02 ASSESSMENT — ENCOUNTER SYMPTOMS
VOMITING: 0
EYE DISCHARGE: 0
COUGH: 0
ABDOMINAL PAIN: 0
COLOR CHANGE: 0
DIARRHEA: 0
WHEEZING: 0
BACK PAIN: 1
NAUSEA: 0

## 2018-10-03 NOTE — PROGRESS NOTES
SUBJECTIVE:    Patient ID: Vanesa Mcclain is a 45 y.o. female. HPI:   Routine Gyn Exam  Patient here for routine exam.  She is also complaining today of some flank pain. She states that this is been going on for the last couple of days. She states that she has a history of kidney stones. She states that she is not sure if she has a flare up of her stones or not. She did have some Tylenol 3 at home that she likes to keep in case she gets a stone and did take one of these. She states that it did seem to help some with the pain. The pain is still occasionally they're over the last couple of days. Denies Fever or chills. Gynecologic History  Patient's last menstrual period was 09/03/2018 (exact date). Last Pap: 1 year ago      Past Medical History:   Diagnosis Date    Anxiety     Bell's palsy     Chronic migraine without aura, intractable, without status migrainosus 8/10/2017    Depression     Headache     Insomnia     Intractable migraine without aura and without status migrainosus 8/10/2017    Irritable bowel syndrome     Kidney stone 10/2017    Osteoarthritis     Periodic limb movement disorder (PLMD)     Snoring     PSG, 7/2016-mild      Current Outpatient Prescriptions   Medication Sig Dispense Refill    acetaminophen-codeine (TYLENOL #3) 300-30 MG per tablet Take 1 tablet by mouth every 6 hours as needed for Pain for up to 30 days. . 30 tablet 0    LORazepam (ATIVAN) 1 MG tablet Take 1 tablet by mouth every 8 hours as needed for Anxiety for up to 30 days. . 90 tablet 2    valACYclovir (VALTREX) 1 g tablet TAKE TWO TABLETS BY MOUTH NOW AND REPEAT 2 IN 24 HOURS 8 tablet 3    progesterone (ENDOMETRIN) 100 MG suppository Place 50 mg vaginally daily      polyethylene glycol (GLYCOLAX) packet Take 17 g by mouth daily      omeprazole (PRILOSEC) 40 MG delayed release capsule Take 40 mg by mouth 2 times daily      desvenlafaxine succinate (PRISTIQ) 100 MG TB24 extended release tablet Take by CT.    EMR Dragon/transcription disclaimer:  Much of this encounter note is electronic transcription/translation of spoken language to printed texts. The electronic translation of spoken language may be erroneous, or at times, nonsensical words or phrases may be inadvertently transcribed.   Although I have reviewed the note for such errors, some may still exist.

## 2018-10-12 ENCOUNTER — TELEPHONE (OUTPATIENT)
Dept: NEUROLOGY | Age: 38
End: 2018-10-12

## 2018-11-27 ENCOUNTER — NURSE ONLY (OUTPATIENT)
Dept: PRIMARY CARE CLINIC | Age: 38
End: 2018-11-27
Payer: OTHER GOVERNMENT

## 2018-11-27 DIAGNOSIS — R30.9 PAIN WITH URINATION: Primary | ICD-10-CM

## 2018-11-27 LAB
APPEARANCE FLUID: NORMAL
BILIRUBIN, POC: NORMAL
BLOOD URINE, POC: NORMAL
CLARITY, POC: CLEAR
COLOR, POC: YELLOW
GLUCOSE URINE, POC: NORMAL
KETONES, POC: NORMAL
LEUKOCYTE EST, POC: NORMAL
NITRITE, POC: NORMAL
PH, POC: 5
PROTEIN, POC: NORMAL
SPECIFIC GRAVITY, POC: 1.01
UROBILINOGEN, POC: 0.2

## 2018-11-27 PROCEDURE — 81002 URINALYSIS NONAUTO W/O SCOPE: CPT | Performed by: FAMILY MEDICINE

## 2018-12-13 RX ORDER — DESVENLAFAXINE 100 MG/1
100 TABLET, EXTENDED RELEASE ORAL DAILY
Qty: 90 TABLET | Refills: 3 | Status: SHIPPED | OUTPATIENT
Start: 2018-12-13 | End: 2019-09-09

## 2018-12-20 ENCOUNTER — OFFICE VISIT (OUTPATIENT)
Dept: PRIMARY CARE CLINIC | Age: 38
End: 2018-12-20
Payer: OTHER GOVERNMENT

## 2018-12-20 VITALS
HEIGHT: 60 IN | HEART RATE: 94 BPM | SYSTOLIC BLOOD PRESSURE: 124 MMHG | RESPIRATION RATE: 18 BRPM | OXYGEN SATURATION: 98 % | DIASTOLIC BLOOD PRESSURE: 76 MMHG | BODY MASS INDEX: 25.87 KG/M2 | TEMPERATURE: 97.4 F | WEIGHT: 131.8 LBS

## 2018-12-20 DIAGNOSIS — M79.602 PAIN OF LEFT UPPER EXTREMITY: Primary | ICD-10-CM

## 2018-12-20 PROCEDURE — 99213 OFFICE O/P EST LOW 20 MIN: CPT | Performed by: FAMILY MEDICINE

## 2018-12-20 ASSESSMENT — ENCOUNTER SYMPTOMS
WHEEZING: 0
ABDOMINAL PAIN: 0
BACK PAIN: 0
COLOR CHANGE: 0
DIARRHEA: 0
NAUSEA: 0
COUGH: 0
EYE DISCHARGE: 0
VOMITING: 0

## 2018-12-20 NOTE — PROGRESS NOTES
SUBJECTIVE:    Patient ID: Kourtney Cam is a 45 y.o. female. HPI:   Patient presents today with pain in her left bicep area. She states that this started about a week ago after giving herself an allergy injection in that arm. She states that it is been tender and the pain moves down into her left forearm area. She states that she has chronic numbness and tingling but has not had any worse numbness and tingling in the last few days. She denies any fever or chills. She has had some warmth to the area on her arm but has not noticed any down in her forearm. She denies any redness or streaking. She does allergy shots once weekly and states that she did slightly reactive the other arm as well. She states that usually she gets it down in her under part of her arm but it is a little higher this time by accident and thinks that she may have gotten the muscle some.     Past Medical History:   Diagnosis Date    Anxiety     Bell's palsy     Chronic migraine without aura, intractable, without status migrainosus 8/10/2017    Depression     Headache     Insomnia     Intractable migraine without aura and without status migrainosus 8/10/2017    Irritable bowel syndrome     Kidney stone 10/2017    Osteoarthritis     Periodic limb movement disorder (PLMD)     Snoring     PSG, 7/2016-mild      Current Outpatient Prescriptions   Medication Sig Dispense Refill    desvenlafaxine succinate (PRISTIQ) 100 MG TB24 extended release tablet Take 1 tablet by mouth daily 90 tablet 3    Erenumab-aooe (AIMOVIG) 70 MG/ML SOAJ Inject 70 mLs into the skin every 30 days 1 pen 11    spironolactone (ALDACTONE) 50 MG tablet Take 50 mg by mouth daily      valACYclovir (VALTREX) 1 g tablet TAKE TWO TABLETS BY MOUTH NOW AND REPEAT 2 IN 24 HOURS 8 tablet 3    progesterone (ENDOMETRIN) 100 MG suppository Place 50 mg vaginally daily      cyproheptadine (PERIACTIN) 4 MG tablet 1 q hs x 1 week can increase to bid 60 tablet 5   

## 2018-12-21 DIAGNOSIS — M79.602 PAIN OF LEFT UPPER EXTREMITY: ICD-10-CM

## 2018-12-27 ENCOUNTER — TELEPHONE (OUTPATIENT)
Dept: PRIMARY CARE CLINIC | Age: 38
End: 2018-12-27

## 2018-12-27 RX ORDER — METHYLPREDNISOLONE 4 MG/1
TABLET ORAL
Qty: 1 KIT | Refills: 0 | Status: SHIPPED | OUTPATIENT
Start: 2018-12-27 | End: 2019-01-02

## 2018-12-27 NOTE — TELEPHONE ENCOUNTER
PT was seen last week and an US of her arm was performed. The US came back normal and she was instructed to start taking the steroids called in by  but the steroids were never called in to [facility] and now the PT states there is like a knot in one of her veins of the left arm.

## 2019-01-24 ENCOUNTER — OFFICE VISIT (OUTPATIENT)
Dept: NEUROLOGY | Age: 39
End: 2019-01-24
Payer: OTHER GOVERNMENT

## 2019-01-24 VITALS
HEART RATE: 84 BPM | BODY MASS INDEX: 26.7 KG/M2 | WEIGHT: 136 LBS | SYSTOLIC BLOOD PRESSURE: 111 MMHG | HEIGHT: 60 IN | OXYGEN SATURATION: 97 % | DIASTOLIC BLOOD PRESSURE: 79 MMHG

## 2019-01-24 DIAGNOSIS — G43.019 INTRACTABLE MIGRAINE WITHOUT AURA AND WITHOUT STATUS MIGRAINOSUS: Primary | ICD-10-CM

## 2019-01-24 PROCEDURE — 99213 OFFICE O/P EST LOW 20 MIN: CPT | Performed by: PHYSICIAN ASSISTANT

## 2019-01-24 RX ORDER — LORAZEPAM 1 MG/1
1 TABLET ORAL EVERY 6 HOURS PRN
COMMUNITY
End: 2019-04-18 | Stop reason: SDUPTHER

## 2019-02-14 ENCOUNTER — OFFICE VISIT (OUTPATIENT)
Dept: PRIMARY CARE CLINIC | Age: 39
End: 2019-02-14
Payer: OTHER GOVERNMENT

## 2019-02-14 VITALS
WEIGHT: 137 LBS | DIASTOLIC BLOOD PRESSURE: 78 MMHG | OXYGEN SATURATION: 99 % | TEMPERATURE: 97.5 F | HEART RATE: 75 BPM | BODY MASS INDEX: 26.9 KG/M2 | HEIGHT: 60 IN | SYSTOLIC BLOOD PRESSURE: 116 MMHG | RESPIRATION RATE: 20 BRPM

## 2019-02-14 DIAGNOSIS — J01.00 ACUTE NON-RECURRENT MAXILLARY SINUSITIS: ICD-10-CM

## 2019-02-14 DIAGNOSIS — J02.9 SORE THROAT: Primary | ICD-10-CM

## 2019-02-14 DIAGNOSIS — R52 GENERALIZED BODY ACHES: ICD-10-CM

## 2019-02-14 LAB
INFLUENZA A ANTIBODY: NORMAL
INFLUENZA B ANTIBODY: NORMAL
S PYO AG THROAT QL: NORMAL

## 2019-02-14 PROCEDURE — 99213 OFFICE O/P EST LOW 20 MIN: CPT | Performed by: FAMILY MEDICINE

## 2019-02-14 PROCEDURE — 87804 INFLUENZA ASSAY W/OPTIC: CPT | Performed by: FAMILY MEDICINE

## 2019-02-14 PROCEDURE — 87880 STREP A ASSAY W/OPTIC: CPT | Performed by: FAMILY MEDICINE

## 2019-02-14 RX ORDER — GUAIFENESIN 600 MG/1
600 TABLET, EXTENDED RELEASE ORAL 2 TIMES DAILY
Qty: 30 TABLET | Refills: 0 | Status: SHIPPED | OUTPATIENT
Start: 2019-02-14 | End: 2019-03-01

## 2019-02-14 RX ORDER — AZITHROMYCIN 250 MG/1
TABLET, FILM COATED ORAL
Qty: 6 TABLET | Refills: 0 | Status: SHIPPED | OUTPATIENT
Start: 2019-02-14 | End: 2019-03-04

## 2019-02-14 RX ORDER — FLUTICASONE PROPIONATE 50 MCG
2 SPRAY, SUSPENSION (ML) NASAL DAILY
Qty: 1 BOTTLE | Refills: 3 | Status: SHIPPED | OUTPATIENT
Start: 2019-02-14 | End: 2019-09-16

## 2019-02-14 ASSESSMENT — ENCOUNTER SYMPTOMS
ABDOMINAL PAIN: 0
SORE THROAT: 1
EYE ITCHING: 0
COUGH: 1
COLOR CHANGE: 0
WHEEZING: 0
VOMITING: 0
NAUSEA: 0
CHEST TIGHTNESS: 0
RHINORRHEA: 1
EYE DISCHARGE: 0
SINUS PRESSURE: 0
EYE REDNESS: 0
DIARRHEA: 0

## 2019-03-04 ENCOUNTER — OFFICE VISIT (OUTPATIENT)
Dept: PRIMARY CARE CLINIC | Age: 39
End: 2019-03-04
Payer: OTHER GOVERNMENT

## 2019-03-04 VITALS
DIASTOLIC BLOOD PRESSURE: 74 MMHG | SYSTOLIC BLOOD PRESSURE: 104 MMHG | BODY MASS INDEX: 27.09 KG/M2 | OXYGEN SATURATION: 98 % | TEMPERATURE: 97.9 F | RESPIRATION RATE: 20 BRPM | HEIGHT: 60 IN | WEIGHT: 138 LBS | HEART RATE: 69 BPM

## 2019-03-04 DIAGNOSIS — G43.719 CHRONIC MIGRAINE WITHOUT AURA, INTRACTABLE, WITHOUT STATUS MIGRAINOSUS: ICD-10-CM

## 2019-03-04 DIAGNOSIS — E66.3 OVERWEIGHT (BMI 25.0-29.9): ICD-10-CM

## 2019-03-04 DIAGNOSIS — N92.1 MENORRHAGIA WITH IRREGULAR CYCLE: Primary | ICD-10-CM

## 2019-03-04 DIAGNOSIS — K59.04 CHRONIC IDIOPATHIC CONSTIPATION: ICD-10-CM

## 2019-03-04 PROCEDURE — 99214 OFFICE O/P EST MOD 30 MIN: CPT | Performed by: FAMILY MEDICINE

## 2019-03-04 ASSESSMENT — PATIENT HEALTH QUESTIONNAIRE - PHQ9
2. FEELING DOWN, DEPRESSED OR HOPELESS: 0
SUM OF ALL RESPONSES TO PHQ QUESTIONS 1-9: 0
1. LITTLE INTEREST OR PLEASURE IN DOING THINGS: 0
SUM OF ALL RESPONSES TO PHQ QUESTIONS 1-9: 0
SUM OF ALL RESPONSES TO PHQ9 QUESTIONS 1 & 2: 0

## 2019-03-04 ASSESSMENT — ENCOUNTER SYMPTOMS
VOMITING: 0
WHEEZING: 0
CONSTIPATION: 1
BACK PAIN: 0
ABDOMINAL PAIN: 0
NAUSEA: 0
DIARRHEA: 0
COUGH: 0
COLOR CHANGE: 0
EYE DISCHARGE: 0

## 2019-03-20 ENCOUNTER — TELEPHONE (OUTPATIENT)
Dept: PRIMARY CARE CLINIC | Age: 39
End: 2019-03-20

## 2019-03-20 DIAGNOSIS — M54.6 ACUTE BILATERAL THORACIC BACK PAIN: Primary | ICD-10-CM

## 2019-03-27 DIAGNOSIS — M54.6 ACUTE BILATERAL THORACIC BACK PAIN: ICD-10-CM

## 2019-04-01 DIAGNOSIS — M54.6 THORACIC BACK PAIN, UNSPECIFIED BACK PAIN LATERALITY, UNSPECIFIED CHRONICITY: Primary | ICD-10-CM

## 2019-04-01 DIAGNOSIS — M54.50 LUMBAR BACK PAIN: ICD-10-CM

## 2019-04-18 DIAGNOSIS — F41.9 ANXIETY: Primary | ICD-10-CM

## 2019-04-18 RX ORDER — LORAZEPAM 1 MG/1
1 TABLET ORAL DAILY
Qty: 30 TABLET | Refills: 0 | Status: SHIPPED | OUTPATIENT
Start: 2019-04-18 | End: 2019-09-09 | Stop reason: SDUPTHER

## 2019-06-03 RX ORDER — TRAZODONE HYDROCHLORIDE 100 MG/1
100 TABLET ORAL NIGHTLY
Qty: 30 TABLET | Refills: 0 | Status: SHIPPED | OUTPATIENT
Start: 2019-06-03 | End: 2019-06-24 | Stop reason: SDUPTHER

## 2019-06-03 NOTE — TELEPHONE ENCOUNTER
Walter Beltre has requested a refill on her medication. Last office visit : 1/24/2019   Next office visit : 7/24/2019   Last medication refill :  Lena Mora :       Requested Prescriptions     Pending Prescriptions Disp Refills    eletriptan (RELPAX) 40 MG tablet 12 tablet 5     Sig: As directed         Please approve or refuse this medication.    [address]

## 2019-06-04 RX ORDER — ELETRIPTAN HYDROBROMIDE 40 MG/1
TABLET, FILM COATED ORAL
Qty: 12 TABLET | Refills: 5 | Status: SHIPPED | OUTPATIENT
Start: 2019-06-04 | End: 2019-09-09

## 2019-06-24 RX ORDER — TRAZODONE HYDROCHLORIDE 100 MG/1
100 TABLET ORAL NIGHTLY
Qty: 90 TABLET | Refills: 3 | Status: SHIPPED | OUTPATIENT
Start: 2019-06-24 | End: 2019-06-24 | Stop reason: SDUPTHER

## 2019-06-24 RX ORDER — TRAZODONE HYDROCHLORIDE 100 MG/1
100 TABLET ORAL NIGHTLY
Qty: 90 TABLET | Refills: 3 | Status: SHIPPED | OUTPATIENT
Start: 2019-06-24 | End: 2019-11-07 | Stop reason: ALTCHOICE

## 2019-06-24 RX ORDER — TRAZODONE HYDROCHLORIDE 100 MG/1
100 TABLET ORAL NIGHTLY
Qty: 30 TABLET | Refills: 0 | Status: CANCELLED | OUTPATIENT
Start: 2019-06-24

## 2019-07-24 ENCOUNTER — OFFICE VISIT (OUTPATIENT)
Dept: NEUROLOGY | Age: 39
End: 2019-07-24
Payer: OTHER GOVERNMENT

## 2019-07-24 VITALS
HEART RATE: 84 BPM | BODY MASS INDEX: 27.68 KG/M2 | WEIGHT: 141 LBS | HEIGHT: 60 IN | DIASTOLIC BLOOD PRESSURE: 84 MMHG | OXYGEN SATURATION: 99 % | SYSTOLIC BLOOD PRESSURE: 117 MMHG

## 2019-07-24 DIAGNOSIS — G43.019 INTRACTABLE MIGRAINE WITHOUT AURA AND WITHOUT STATUS MIGRAINOSUS: Primary | ICD-10-CM

## 2019-07-24 PROCEDURE — 99213 OFFICE O/P EST LOW 20 MIN: CPT | Performed by: PHYSICIAN ASSISTANT

## 2019-07-24 NOTE — PROGRESS NOTES
[id number] [facility] Neurology Follow Up Encounter      Information:   Patient Name: Desmond Dugan  :   1980  Age:   44 y.o. MRN:   [id number]  Account #:  [de-identified]  Date:              19    Provider:  Kaleb Kaba PA-C    Chief Complaint   Patient presents with    Migraine     pt states everything is going ok       Subjective:   Desmond Dugan is a 44 y.o. female  with a history of migraines, snoring, and PLMD who comes in for a follow up. who comes in for a follow up. At her last office visit, 19 she was to continue Aimovig and Relpax. The migraines are stable as noted below: The PLMD is stable. Migraine: (review, 2019)  Pain location:  frontal  Onset:  Early   Character:  Throbbing  Radiates to: No  Severity:  10/10 for a migraine 2 weeks ago  Duration: All day for the recent migraine  Timin in the last month  Progression: Improved  Similar to prior headaches:  Yes  Associated:   Nausea, photophobia, and phonophobia  Aggravated:  Menses, allergies, or heat   Treatments tried:  Sleep and a dark room; Fioricet; cold gel mask; Excedrin, Topamax, Maxalt, Relpax, Periactin; hot shower  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  Alleviated: Usually Relpax but she sometimes has to take 2;  Aimovig       Objective:     Past Medical History:   Diagnosis Date    Anxiety     Bell's palsy     Chronic migraine without aura, intractable, without status migrainosus 8/10/2017    Depression     Headache     Insomnia     Intractable migraine without aura and without status migrainosus 8/10/2017    Irritable bowel syndrome     Kidney stone 10/2017    Osteoarthritis     Periodic limb movement disorder (PLMD)     Snoring     PSG, 2016-mild       Past Surgical History:   Procedure Laterality Date    ANKLE SURGERY Right     CYSTOURETHROSCOPY/STENT REMOVAL      TUBAL LIGATION      URETER STENT PLACEMENT X 2       Recent Hospitalizations  ·     Significant Injuries  ·     History reviewed. No pertinent family history.     Social History     Socioeconomic History    Marital status:      Spouse name: Not on file    Number of children: Not on file    Years of education: Not on file    Highest education level: Not on file   Occupational History    Not on file   Social Needs    Financial resource strain: Not on file    Food insecurity:     Worry: Not on file     Inability: Not on file    Transportation needs:     Medical: Not on file     Non-medical: Not on file   Tobacco Use    Smoking status: Never Smoker    Smokeless tobacco: Never Used   Substance and Sexual Activity    Alcohol use: No    Drug use: No    Sexual activity: Not on file   Lifestyle    Physical activity:     Days per week: Not on file     Minutes per session: Not on file    Stress: Not on file   Relationships    Social connections:     Talks on phone: Not on file     Gets together: Not on file     Attends [employer] service: Not on file     Active member of club or organization: Not on file     Attends meetings of clubs or organizations: Not on file     Relationship status: Not on file    Intimate partner violence:     Fear of current or ex partner: Not on file     Emotionally abused: Not on file     Physically abused: Not on file     Forced sexual activity: Not on file   Other Topics Concern    Not on file   Social History Narrative    Not on file       Medications:  Current Outpatient Medications   Medication Sig Dispense Refill    traZODone (DESYREL) 100 MG tablet Take 1 tablet by mouth nightly 90 tablet 3    eletriptan (RELPAX) 40 MG tablet As directed 12 tablet 5    desvenlafaxine succinate (PRISTIQ) 100 MG TB24 extended release tablet Take 1 tablet by mouth daily 90 tablet 3    Erenumab-aooe (AIMOVIG) 70 MG/ML SOAJ Inject 70 mLs into the skin every 30 days 1 pen 11    valACYclovir (VALTREX) 1 g tablet TAKE TWO

## 2019-07-24 NOTE — PATIENT INSTRUCTIONS
tricyclics can include dry mouth, constipation, palpitations, weight gain, blurred vision, and urinary retention. Confusion can occur, particularly in older adults. Anti-seizure medications -- The anti-seizure medications valproate (also called divalproex), gabapentin, and topiramate are sometimes used to prevent migraines. ?Valproate is an anti-seizure drug that seems to work as well as beta blockers for preventing migraine, and may be better tolerated. However, valproate can cause weight gain and hair loss. Women who are pregnant or sexually active and not using birth control (pills, condoms, etc) should not take valproate. ?Gabapentin was effective for reducing migraine headache frequency in a small clinical trial. Potential side effects include lightheadedness, drowsiness, dizziness, and balance problems. ?Topiramate is an anti-seizure drug that can help to prevent migraine. It can cause mild to moderate side effects that may include abnormal sensations (often tingling), fatigue, nausea, changes in taste, loss of appetite, diarrhea, and weight loss. More severe side effects can occur, including difficulty with thinking and concentration. Calcium channel blockers -- Calcium channel blockers were developed to treat high blood pressure. Calcium channel blockers are widely used for migraine prevention. Examples of calcium channel blockers include verapamil and nifedipine extended-release. Verapamil is frequently used as a first choice for preventive migraine therapy because it is easy to use and has few side effects. Calcium channel blockers may lose their effectiveness over time, but this can sometimes be remedied by taking a higher dose of the drug or switching to a similar drug. Herbal therapies -- Herbal therapies have been evaluated for the treatment of migraine headache, including feverfew and butterbur. Of these, feverfew has been the most widely studied.  Some studies have found it to be effective for

## 2019-07-30 RX ORDER — VALACYCLOVIR HYDROCHLORIDE 1 G/1
2000 TABLET, FILM COATED ORAL 2 TIMES DAILY
Qty: 8 TABLET | Refills: 3 | Status: SHIPPED | OUTPATIENT
Start: 2019-07-30 | End: 2019-11-15

## 2019-08-08 ENCOUNTER — PATIENT MESSAGE (OUTPATIENT)
Dept: PRIMARY CARE CLINIC | Age: 39
End: 2019-08-08

## 2019-08-08 RX ORDER — AZITHROMYCIN 250 MG/1
TABLET, FILM COATED ORAL
Qty: 6 TABLET | Refills: 0 | Status: SHIPPED | OUTPATIENT
Start: 2019-08-08 | End: 2019-09-09 | Stop reason: ALTCHOICE

## 2019-08-08 RX ORDER — GUAIFENESIN 600 MG/1
600 TABLET, EXTENDED RELEASE ORAL 2 TIMES DAILY
Qty: 30 TABLET | Refills: 0 | Status: SHIPPED | OUTPATIENT
Start: 2019-08-08 | End: 2019-08-23

## 2019-08-08 RX ORDER — FLUTICASONE PROPIONATE 50 MCG
2 SPRAY, SUSPENSION (ML) NASAL DAILY
Qty: 1 BOTTLE | Refills: 3 | Status: SHIPPED | OUTPATIENT
Start: 2019-08-08 | End: 2019-09-09 | Stop reason: SDUPTHER

## 2019-08-13 ENCOUNTER — OFFICE VISIT (OUTPATIENT)
Dept: OBGYN | Age: 39
End: 2019-08-13
Payer: OTHER GOVERNMENT

## 2019-08-13 VITALS
HEART RATE: 74 BPM | DIASTOLIC BLOOD PRESSURE: 82 MMHG | SYSTOLIC BLOOD PRESSURE: 118 MMHG | HEIGHT: 60 IN | WEIGHT: 141 LBS | BODY MASS INDEX: 27.68 KG/M2 | TEMPERATURE: 98.3 F

## 2019-08-13 DIAGNOSIS — N92.0 MENORRHAGIA WITH REGULAR CYCLE: Primary | ICD-10-CM

## 2019-08-13 DIAGNOSIS — Z86.69 HISTORY OF MIGRAINE HEADACHES: ICD-10-CM

## 2019-08-13 PROCEDURE — 99203 OFFICE O/P NEW LOW 30 MIN: CPT | Performed by: NURSE PRACTITIONER

## 2019-08-13 RX ORDER — ZOLMITRIPTAN 2.5 MG/1
2.5 TABLET, FILM COATED ORAL PRN
COMMUNITY
End: 2019-11-07

## 2019-08-13 ASSESSMENT — ENCOUNTER SYMPTOMS
RESPIRATORY NEGATIVE: 1
GASTROINTESTINAL NEGATIVE: 1
EYES NEGATIVE: 1

## 2019-08-27 ENCOUNTER — HOSPITAL ENCOUNTER (OUTPATIENT)
Dept: ULTRASOUND IMAGING | Age: 39
Discharge: HOME | End: 2019-08-27
Payer: OTHER GOVERNMENT

## 2019-08-27 DIAGNOSIS — N92.0 MENORRHAGIA WITH REGULAR CYCLE: ICD-10-CM

## 2019-08-27 PROCEDURE — 76830 TRANSVAGINAL US NON-OB: CPT

## 2019-09-09 ENCOUNTER — OFFICE VISIT (OUTPATIENT)
Dept: PRIMARY CARE CLINIC | Age: 39
End: 2019-09-09
Payer: OTHER GOVERNMENT

## 2019-09-09 VITALS
SYSTOLIC BLOOD PRESSURE: 100 MMHG | TEMPERATURE: 98.4 F | BODY MASS INDEX: 27.29 KG/M2 | DIASTOLIC BLOOD PRESSURE: 72 MMHG | HEART RATE: 90 BPM | OXYGEN SATURATION: 98 % | WEIGHT: 139 LBS | RESPIRATION RATE: 20 BRPM | HEIGHT: 60 IN

## 2019-09-09 DIAGNOSIS — Z00.00 WELLNESS EXAMINATION: Primary | ICD-10-CM

## 2019-09-09 DIAGNOSIS — G25.81 RLS (RESTLESS LEGS SYNDROME): ICD-10-CM

## 2019-09-09 DIAGNOSIS — F41.9 ANXIETY: ICD-10-CM

## 2019-09-09 PROCEDURE — 99214 OFFICE O/P EST MOD 30 MIN: CPT | Performed by: FAMILY MEDICINE

## 2019-09-09 RX ORDER — CITALOPRAM 20 MG/1
20 TABLET ORAL DAILY
Qty: 30 TABLET | Refills: 3 | Status: SHIPPED | OUTPATIENT
Start: 2019-09-09 | End: 2019-09-26 | Stop reason: SDUPTHER

## 2019-09-09 RX ORDER — ROPINIROLE 0.5 MG/1
0.5 TABLET, FILM COATED ORAL 3 TIMES DAILY
Qty: 90 TABLET | Refills: 3 | Status: SHIPPED | OUTPATIENT
Start: 2019-09-09 | End: 2019-09-26 | Stop reason: SDUPTHER

## 2019-09-09 RX ORDER — LORAZEPAM 1 MG/1
1 TABLET ORAL DAILY PRN
COMMUNITY
End: 2019-12-05 | Stop reason: SDUPTHER

## 2019-09-09 RX ORDER — DESVENLAFAXINE 25 MG/1
25 TABLET, EXTENDED RELEASE ORAL DAILY
Qty: 30 TABLET | Refills: 0 | Status: SHIPPED | OUTPATIENT
Start: 2019-09-09 | End: 2019-11-07

## 2019-09-09 RX ORDER — LORAZEPAM 1 MG/1
1 TABLET ORAL DAILY
Qty: 30 TABLET | Refills: 0 | Status: SHIPPED | OUTPATIENT
Start: 2019-09-09 | End: 2019-10-09

## 2019-09-12 ENCOUNTER — OFFICE VISIT (OUTPATIENT)
Dept: URGENT CARE | Age: 39
End: 2019-09-12
Payer: OTHER GOVERNMENT

## 2019-09-12 VITALS
WEIGHT: 139 LBS | TEMPERATURE: 98.4 F | SYSTOLIC BLOOD PRESSURE: 110 MMHG | BODY MASS INDEX: 27.15 KG/M2 | HEART RATE: 93 BPM | DIASTOLIC BLOOD PRESSURE: 70 MMHG | OXYGEN SATURATION: 98 % | RESPIRATION RATE: 18 BRPM

## 2019-09-12 DIAGNOSIS — R52 GENERALIZED BODY ACHES: ICD-10-CM

## 2019-09-12 DIAGNOSIS — J02.9 SORE THROAT: Primary | ICD-10-CM

## 2019-09-12 DIAGNOSIS — J06.9 UPPER RESPIRATORY TRACT INFECTION, UNSPECIFIED TYPE: ICD-10-CM

## 2019-09-12 PROCEDURE — 99213 OFFICE O/P EST LOW 20 MIN: CPT | Performed by: NURSE PRACTITIONER

## 2019-09-12 ASSESSMENT — ENCOUNTER SYMPTOMS
SHORTNESS OF BREATH: 0
ALLERGIC/IMMUNOLOGIC NEGATIVE: 1
DIARRHEA: 0
COUGH: 0
BACK PAIN: 0
ABDOMINAL PAIN: 1
SINUS PRESSURE: 0
SORE THROAT: 1
EYES NEGATIVE: 1
VOMITING: 0
NAUSEA: 0
SINUS PAIN: 0

## 2019-09-15 ENCOUNTER — HOSPITAL ENCOUNTER (EMERGENCY)
Age: 39
Discharge: HOME | End: 2019-09-15
Payer: OTHER GOVERNMENT

## 2019-09-15 VITALS
TEMPERATURE: 97.1 F | OXYGEN SATURATION: 96 % | WEIGHT: 139 LBS | DIASTOLIC BLOOD PRESSURE: 95 MMHG | RESPIRATION RATE: 16 BRPM | SYSTOLIC BLOOD PRESSURE: 141 MMHG | HEIGHT: 60 IN | HEART RATE: 80 BPM | BODY MASS INDEX: 27.29 KG/M2

## 2019-09-15 DIAGNOSIS — G43.809 OTHER MIGRAINE WITHOUT STATUS MIGRAINOSUS, NOT INTRACTABLE: ICD-10-CM

## 2019-09-15 DIAGNOSIS — J06.9 VIRAL UPPER RESPIRATORY TRACT INFECTION: Primary | ICD-10-CM

## 2019-09-15 PROCEDURE — 96372 THER/PROPH/DIAG INJ SC/IM: CPT

## 2019-09-15 PROCEDURE — 99282 EMERGENCY DEPT VISIT SF MDM: CPT

## 2019-09-15 PROCEDURE — 6370000000 HC RX 637 (ALT 250 FOR IP): Performed by: NURSE PRACTITIONER

## 2019-09-15 PROCEDURE — 6360000002 HC RX W HCPCS: Performed by: NURSE PRACTITIONER

## 2019-09-15 RX ORDER — ONDANSETRON 4 MG/1
4 TABLET, ORALLY DISINTEGRATING ORAL ONCE
Status: COMPLETED | OUTPATIENT
Start: 2019-09-15 | End: 2019-09-15

## 2019-09-15 RX ORDER — KETOROLAC TROMETHAMINE 30 MG/ML
60 INJECTION, SOLUTION INTRAMUSCULAR; INTRAVENOUS ONCE
Status: COMPLETED | OUTPATIENT
Start: 2019-09-15 | End: 2019-09-15

## 2019-09-15 RX ORDER — DIPHENHYDRAMINE HYDROCHLORIDE 50 MG/ML
50 INJECTION INTRAMUSCULAR; INTRAVENOUS ONCE
Status: COMPLETED | OUTPATIENT
Start: 2019-09-15 | End: 2019-09-15

## 2019-09-15 RX ADMIN — DIPHENHYDRAMINE HYDROCHLORIDE 50 MG: 50 INJECTION, SOLUTION INTRAMUSCULAR; INTRAVENOUS at 19:34

## 2019-09-15 RX ADMIN — KETOROLAC TROMETHAMINE 60 MG: 30 INJECTION, SOLUTION INTRAMUSCULAR at 19:35

## 2019-09-15 RX ADMIN — ONDANSETRON 4 MG: 4 TABLET, ORALLY DISINTEGRATING ORAL at 19:34

## 2019-09-15 ASSESSMENT — PAIN SCALES - GENERAL: PAINLEVEL_OUTOF10: 7

## 2019-09-16 ENCOUNTER — OFFICE VISIT (OUTPATIENT)
Dept: PRIMARY CARE CLINIC | Age: 39
End: 2019-09-16
Payer: OTHER GOVERNMENT

## 2019-09-16 VITALS
TEMPERATURE: 98.2 F | SYSTOLIC BLOOD PRESSURE: 110 MMHG | WEIGHT: 140.6 LBS | HEIGHT: 60 IN | RESPIRATION RATE: 18 BRPM | DIASTOLIC BLOOD PRESSURE: 68 MMHG | HEART RATE: 73 BPM | BODY MASS INDEX: 27.61 KG/M2 | OXYGEN SATURATION: 97 %

## 2019-09-16 DIAGNOSIS — R05.9 COUGH: Primary | ICD-10-CM

## 2019-09-16 DIAGNOSIS — R09.81 HEAD CONGESTION: ICD-10-CM

## 2019-09-16 DIAGNOSIS — J06.9 URI, ACUTE: ICD-10-CM

## 2019-09-16 PROCEDURE — 96372 THER/PROPH/DIAG INJ SC/IM: CPT | Performed by: FAMILY MEDICINE

## 2019-09-16 PROCEDURE — 99213 OFFICE O/P EST LOW 20 MIN: CPT | Performed by: FAMILY MEDICINE

## 2019-09-16 RX ORDER — PREDNISONE 10 MG/1
TABLET ORAL
Qty: 21 TABLET | Refills: 0 | Status: SHIPPED | OUTPATIENT
Start: 2019-09-16 | End: 2019-11-07

## 2019-09-16 RX ORDER — BETAMETHASONE SODIUM PHOSPHATE AND BETAMETHASONE ACETATE 3; 3 MG/ML; MG/ML
12 INJECTION, SUSPENSION INTRA-ARTICULAR; INTRALESIONAL; INTRAMUSCULAR; SOFT TISSUE ONCE
Status: COMPLETED | OUTPATIENT
Start: 2019-09-16 | End: 2019-09-16

## 2019-09-16 RX ORDER — FLUTICASONE PROPIONATE 50 MCG
2 SPRAY, SUSPENSION (ML) NASAL DAILY
Qty: 1 BOTTLE | Refills: 3 | Status: SHIPPED | OUTPATIENT
Start: 2019-09-16 | End: 2019-11-15

## 2019-09-16 RX ADMIN — BETAMETHASONE SODIUM PHOSPHATE AND BETAMETHASONE ACETATE 12 MG: 3; 3 INJECTION, SUSPENSION INTRA-ARTICULAR; INTRALESIONAL; INTRAMUSCULAR; SOFT TISSUE at 15:22

## 2019-09-16 ASSESSMENT — ENCOUNTER SYMPTOMS
WHEEZING: 0
COUGH: 0
ABDOMINAL PAIN: 0
COLOR CHANGE: 0
NAUSEA: 0
ABDOMINAL PAIN: 0
BACK PAIN: 0
RHINORRHEA: 1
EYE DISCHARGE: 0
VOMITING: 0
SINUS PAIN: 1
DIARRHEA: 0
COUGH: 1
WHEEZING: 0
BACK PAIN: 0
COLOR CHANGE: 0
DIARRHEA: 0
NAUSEA: 0
SINUS PRESSURE: 1
VOMITING: 0
EYE DISCHARGE: 0

## 2019-09-16 NOTE — PROGRESS NOTES
mouth daily for 30 days. 30 tablet 0    ZOLMitriptan (ZOMIG) 2.5 MG tablet Take 2.5 mg by mouth as needed for Migraine      valACYclovir (VALTREX) 1 g tablet Take 2 tablets by mouth 2 times daily (Patient taking differently: Take 2,000 mg by mouth 2 times daily as needed ) 8 tablet 3    traZODone (DESYREL) 100 MG tablet Take 1 tablet by mouth nightly 90 tablet 3    Erenumab-aooe (AIMOVIG) 70 MG/ML SOAJ Inject 70 mLs into the skin every 30 days 1 pen 11    omeprazole (PRILOSEC) 40 MG delayed release capsule Take 40 mg by mouth 2 times daily      glycopyrrolate (ROBINUL) 2 MG tablet Take 2 mg by mouth 2 times daily       No current facility-administered medications for this visit. Allergies   Allergen Reactions    Doxycycline     Minocycline     Pcn [Penicillins]        Review of Systems   Constitutional: Negative for activity change, appetite change and fever. HENT: Positive for congestion, postnasal drip, rhinorrhea, sinus pressure and sinus pain. Negative for nosebleeds. Eyes: Negative for discharge. Respiratory: Positive for cough. Negative for wheezing. Cardiovascular: Negative for chest pain and leg swelling. Gastrointestinal: Negative for abdominal pain, diarrhea, nausea and vomiting. Genitourinary: Negative for difficulty urinating, frequency and urgency. Musculoskeletal: Negative for back pain and gait problem. Skin: Negative for color change and rash. Neurological: Positive for headaches. Negative for dizziness. Hematological: Does not bruise/bleed easily. Psychiatric/Behavioral: Negative for sleep disturbance and suicidal ideas. OBJECTIVE:     Physical Exam   Constitutional: She is oriented to person, place, and time. She appears well-developed and well-nourished. HENT:   Head: Normocephalic and atraumatic. Right Ear: Tympanic membrane normal.   Left Ear: Tympanic membrane normal.   Nose: Mucosal edema and rhinorrhea present.  Right sinus exhibits maxillary

## 2019-09-16 NOTE — PROGRESS NOTES
HENT:   Head: Normocephalic and atraumatic. Neck: Normal range of motion. Neck supple. Cardiovascular: Normal rate, regular rhythm and normal heart sounds. No murmur heard. Pulmonary/Chest: Effort normal and breath sounds normal. No respiratory distress. Neurological: She is alert and oriented to person, place, and time. Skin: Skin is warm and dry. She is not diaphoretic. Psychiatric: She has a normal mood and affect. Her behavior is normal. Judgment and thought content normal.      /72 (Site: Right Upper Arm, Position: Sitting, Cuff Size: Medium Adult)   Pulse 90   Temp 98.4 °F (36.9 °C) (Temporal)   Resp 20   Ht 5' (1.524 m)   Wt 139 lb (63 kg)   LMP 08/24/2019   SpO2 98%   BMI 27.15 kg/m²      ASSESSMENT:    Rafa Giron was seen today for 6 month follow-up. Diagnoses and all orders for this visit:    Wellness examination  -     CBC Auto Differential; Future  -     Comprehensive Metabolic Panel; Future  -     Lipid Panel; Future  -     TSH without Reflex; Future    Anxiety  -     LORazepam (ATIVAN) 1 MG tablet; Take 1 tablet by mouth daily for 30 days. RLS (restless legs syndrome)    Other orders  -     rOPINIRole (REQUIP) 0.5 MG tablet; Take 1 tablet by mouth 3 times daily  -     citalopram (CELEXA) 20 MG tablet; Take 1 tablet by mouth daily  -     desvenlafaxine succinate (PRISTIQ) 25 MG TB24 extended release tablet; Take 1 tablet by mouth daily        PLAN:    We are going to stop the Pristiq and start Celexa. We are going to continue Ativan as needed for anxiety. See lab orders. .  Will notify results. Requip was sent to the pharmacy to help with restless leg symptoms. Follow-up with us in 6 months for checkup unless needed sooner. EMR Dragon/transcription disclaimer:  Much of this encounter note is electronic transcription/translation of spoken language toprinted texts.   The electronic translation of spoken language may be erroneous, or at times, nonsensical words or phrases may be inadvertently transcribed.   Although I have reviewed the note for such errors, some may stillexist.

## 2019-09-24 DIAGNOSIS — Z00.00 WELLNESS EXAMINATION: ICD-10-CM

## 2019-09-24 LAB
ALBUMIN SERPL-MCNC: 3.8 G/DL (ref 3.5–5.2)
ALP BLD-CCNC: 84 U/L (ref 35–104)
ALT SERPL-CCNC: 12 U/L (ref 5–33)
ANION GAP SERPL CALCULATED.3IONS-SCNC: 10 MMOL/L (ref 7–19)
AST SERPL-CCNC: 12 U/L (ref 5–32)
BASOPHILS ABSOLUTE: 0 K/UL (ref 0–0.2)
BASOPHILS RELATIVE PERCENT: 0.6 % (ref 0–1)
BILIRUB SERPL-MCNC: 0.3 MG/DL (ref 0.2–1.2)
BUN BLDV-MCNC: 19 MG/DL (ref 6–20)
CALCIUM SERPL-MCNC: 8.9 MG/DL (ref 8.6–10)
CHLORIDE BLD-SCNC: 105 MMOL/L (ref 98–111)
CHOLESTEROL, TOTAL: 172 MG/DL (ref 160–199)
CO2: 25 MMOL/L (ref 22–29)
CREAT SERPL-MCNC: 0.6 MG/DL (ref 0.5–0.9)
EOSINOPHILS ABSOLUTE: 0.2 K/UL (ref 0–0.6)
EOSINOPHILS RELATIVE PERCENT: 2.4 % (ref 0–5)
GFR NON-AFRICAN AMERICAN: >60
GLUCOSE BLD-MCNC: 88 MG/DL (ref 74–109)
HCT VFR BLD CALC: 40.7 % (ref 37–47)
HDLC SERPL-MCNC: 79 MG/DL (ref 65–121)
HEMOGLOBIN: 13.1 G/DL (ref 12–16)
IMMATURE GRANULOCYTES #: 0 K/UL
LDL CHOLESTEROL CALCULATED: 68 MG/DL
LYMPHOCYTES ABSOLUTE: 3.3 K/UL (ref 1.1–4.5)
LYMPHOCYTES RELATIVE PERCENT: 47.2 % (ref 20–40)
MCH RBC QN AUTO: 29.7 PG (ref 27–31)
MCHC RBC AUTO-ENTMCNC: 32.2 G/DL (ref 33–37)
MCV RBC AUTO: 92.3 FL (ref 81–99)
MONOCYTES ABSOLUTE: 0.5 K/UL (ref 0–0.9)
MONOCYTES RELATIVE PERCENT: 7.1 % (ref 0–10)
NEUTROPHILS ABSOLUTE: 3 K/UL (ref 1.5–7.5)
NEUTROPHILS RELATIVE PERCENT: 42.6 % (ref 50–65)
PDW BLD-RTO: 13.2 % (ref 11.5–14.5)
PLATELET # BLD: 300 K/UL (ref 130–400)
PMV BLD AUTO: 9 FL (ref 9.4–12.3)
POTASSIUM SERPL-SCNC: 4.1 MMOL/L (ref 3.5–5)
RBC # BLD: 4.41 M/UL (ref 4.2–5.4)
SODIUM BLD-SCNC: 140 MMOL/L (ref 136–145)
TOTAL PROTEIN: 7 G/DL (ref 6.6–8.7)
TRIGL SERPL-MCNC: 123 MG/DL (ref 0–149)
TSH SERPL DL<=0.05 MIU/L-ACNC: 2.11 UIU/ML (ref 0.27–4.2)
WBC # BLD: 7 K/UL (ref 4.8–10.8)

## 2019-09-25 ENCOUNTER — OFFICE VISIT (OUTPATIENT)
Dept: OBGYN | Age: 39
End: 2019-09-25
Payer: OTHER GOVERNMENT

## 2019-09-25 VITALS
DIASTOLIC BLOOD PRESSURE: 74 MMHG | HEIGHT: 60 IN | HEART RATE: 69 BPM | WEIGHT: 141 LBS | BODY MASS INDEX: 27.68 KG/M2 | SYSTOLIC BLOOD PRESSURE: 113 MMHG

## 2019-09-25 DIAGNOSIS — N92.0 MENORRHAGIA WITH REGULAR CYCLE: Primary | ICD-10-CM

## 2019-09-25 DIAGNOSIS — Q51.3 BICORNATE UTERUS: ICD-10-CM

## 2019-09-25 DIAGNOSIS — Z86.69 HISTORY OF MIGRAINE HEADACHES: ICD-10-CM

## 2019-09-25 PROCEDURE — 99213 OFFICE O/P EST LOW 20 MIN: CPT | Performed by: OBSTETRICS & GYNECOLOGY

## 2019-09-25 ASSESSMENT — ENCOUNTER SYMPTOMS
GASTROINTESTINAL NEGATIVE: 1
RESPIRATORY NEGATIVE: 1
EYES NEGATIVE: 1

## 2019-09-26 ENCOUNTER — PATIENT MESSAGE (OUTPATIENT)
Dept: PRIMARY CARE CLINIC | Age: 39
End: 2019-09-26

## 2019-09-26 DIAGNOSIS — J30.9 CHRONIC ALLERGIC RHINITIS: Primary | ICD-10-CM

## 2019-09-26 RX ORDER — ROPINIROLE 0.5 MG/1
0.5 TABLET, FILM COATED ORAL 3 TIMES DAILY
Qty: 90 TABLET | Refills: 3 | Status: SHIPPED | OUTPATIENT
Start: 2019-09-26 | End: 2019-12-12 | Stop reason: SDUPTHER

## 2019-09-26 RX ORDER — CITALOPRAM 20 MG/1
20 TABLET ORAL DAILY
Qty: 30 TABLET | Refills: 3 | Status: SHIPPED | OUTPATIENT
Start: 2019-09-26 | End: 2019-12-12 | Stop reason: SDUPTHER

## 2019-10-04 ENCOUNTER — TELEPHONE (OUTPATIENT)
Dept: OBGYN | Age: 39
End: 2019-10-04

## 2019-10-15 ENCOUNTER — PATIENT MESSAGE (OUTPATIENT)
Dept: PRIMARY CARE CLINIC | Age: 39
End: 2019-10-15

## 2019-10-15 DIAGNOSIS — R53.83 FATIGUE, UNSPECIFIED TYPE: Primary | ICD-10-CM

## 2019-10-29 DIAGNOSIS — R53.83 FATIGUE, UNSPECIFIED TYPE: ICD-10-CM

## 2019-10-29 LAB — VITAMIN B-12: 1477 PG/ML (ref 211–946)

## 2019-11-07 ENCOUNTER — OFFICE VISIT (OUTPATIENT)
Dept: PRIMARY CARE CLINIC | Age: 39
End: 2019-11-07
Payer: OTHER GOVERNMENT

## 2019-11-07 VITALS
SYSTOLIC BLOOD PRESSURE: 122 MMHG | TEMPERATURE: 97.7 F | HEART RATE: 72 BPM | BODY MASS INDEX: 28.47 KG/M2 | RESPIRATION RATE: 18 BRPM | WEIGHT: 145 LBS | HEIGHT: 60 IN | DIASTOLIC BLOOD PRESSURE: 82 MMHG | OXYGEN SATURATION: 96 %

## 2019-11-07 DIAGNOSIS — Z23 NEED FOR INFLUENZA VACCINATION: ICD-10-CM

## 2019-11-07 DIAGNOSIS — G47.00 INSOMNIA, UNSPECIFIED TYPE: Primary | ICD-10-CM

## 2019-11-07 PROCEDURE — 90686 IIV4 VACC NO PRSV 0.5 ML IM: CPT | Performed by: FAMILY MEDICINE

## 2019-11-07 PROCEDURE — 99213 OFFICE O/P EST LOW 20 MIN: CPT | Performed by: FAMILY MEDICINE

## 2019-11-07 PROCEDURE — 90471 IMMUNIZATION ADMIN: CPT | Performed by: FAMILY MEDICINE

## 2019-11-07 RX ORDER — ESZOPICLONE 2 MG/1
2 TABLET, FILM COATED ORAL NIGHTLY
Qty: 30 TABLET | Refills: 0 | Status: SHIPPED | OUTPATIENT
Start: 2019-11-07 | End: 2019-11-15

## 2019-11-07 ASSESSMENT — ENCOUNTER SYMPTOMS
ABDOMINAL PAIN: 0
NAUSEA: 0
COLOR CHANGE: 0
DIARRHEA: 0
WHEEZING: 0
EYE DISCHARGE: 0
BACK PAIN: 0
COUGH: 0
VOMITING: 0

## 2019-11-12 ENCOUNTER — PATIENT MESSAGE (OUTPATIENT)
Dept: PRIMARY CARE CLINIC | Age: 39
End: 2019-11-12

## 2019-11-12 DIAGNOSIS — G47.00 INSOMNIA, UNSPECIFIED TYPE: Primary | ICD-10-CM

## 2019-11-15 ENCOUNTER — HOSPITAL ENCOUNTER (OUTPATIENT)
Dept: PREADMISSION TESTING | Age: 39
Discharge: HOME | End: 2019-11-19
Payer: OTHER GOVERNMENT

## 2019-11-15 ENCOUNTER — OFFICE VISIT (OUTPATIENT)
Dept: OBGYN | Age: 39
End: 2019-11-15
Payer: OTHER GOVERNMENT

## 2019-11-15 VITALS — HEIGHT: 60 IN | WEIGHT: 145 LBS | BODY MASS INDEX: 28.47 KG/M2

## 2019-11-15 VITALS
DIASTOLIC BLOOD PRESSURE: 70 MMHG | BODY MASS INDEX: 28.07 KG/M2 | HEART RATE: 76 BPM | SYSTOLIC BLOOD PRESSURE: 116 MMHG | HEIGHT: 60 IN | WEIGHT: 143 LBS

## 2019-11-15 DIAGNOSIS — Q51.3 BICORNATE UTERUS: ICD-10-CM

## 2019-11-15 DIAGNOSIS — Z86.69 HISTORY OF MIGRAINE HEADACHES: ICD-10-CM

## 2019-11-15 DIAGNOSIS — N92.0 MENORRHAGIA WITH REGULAR CYCLE: Primary | ICD-10-CM

## 2019-11-15 LAB
BASOPHILS ABSOLUTE: 0 K/UL (ref 0–0.2)
BASOPHILS RELATIVE PERCENT: 0.5 % (ref 0–1)
EOSINOPHILS ABSOLUTE: 0.1 K/UL (ref 0–0.6)
EOSINOPHILS RELATIVE PERCENT: 1.7 % (ref 0–5)
HCT VFR BLD CALC: 42.9 % (ref 37–47)
HEMOGLOBIN: 14 G/DL (ref 12–16)
IMMATURE GRANULOCYTES #: 0 K/UL
LYMPHOCYTES ABSOLUTE: 2.8 K/UL (ref 1.1–4.5)
LYMPHOCYTES RELATIVE PERCENT: 43.6 % (ref 20–40)
MCH RBC QN AUTO: 30.4 PG (ref 27–31)
MCHC RBC AUTO-ENTMCNC: 32.6 G/DL (ref 33–37)
MCV RBC AUTO: 93.1 FL (ref 81–99)
MONOCYTES ABSOLUTE: 0.5 K/UL (ref 0–0.9)
MONOCYTES RELATIVE PERCENT: 7 % (ref 0–10)
NEUTROPHILS ABSOLUTE: 3 K/UL (ref 1.5–7.5)
NEUTROPHILS RELATIVE PERCENT: 47 % (ref 50–65)
PDW BLD-RTO: 13 % (ref 11.5–14.5)
PLATELET # BLD: 289 K/UL (ref 130–400)
PMV BLD AUTO: 10.3 FL (ref 9.4–12.3)
RBC # BLD: 4.61 M/UL (ref 4.2–5.4)
WBC # BLD: 6.5 K/UL (ref 4.8–10.8)

## 2019-11-15 PROCEDURE — 99211 OFF/OP EST MAY X REQ PHY/QHP: CPT | Performed by: NURSE PRACTITIONER

## 2019-11-15 PROCEDURE — 85025 COMPLETE CBC W/AUTO DIFF WBC: CPT

## 2019-11-15 RX ORDER — VALACYCLOVIR HYDROCHLORIDE 1 G/1
2000 TABLET, FILM COATED ORAL 2 TIMES DAILY PRN
COMMUNITY
End: 2022-01-03 | Stop reason: SDUPTHER

## 2019-11-15 RX ORDER — PHENAZOPYRIDINE HYDROCHLORIDE 100 MG/1
100 TABLET, FILM COATED ORAL ONCE
Status: CANCELLED | OUTPATIENT
Start: 2019-11-21

## 2019-11-18 RX ORDER — ZOLPIDEM TARTRATE 5 MG/1
5 TABLET ORAL NIGHTLY PRN
Qty: 30 TABLET | Refills: 0 | Status: SHIPPED | OUTPATIENT
Start: 2019-11-18 | End: 2019-12-05

## 2019-11-21 ENCOUNTER — HOSPITAL ENCOUNTER (OUTPATIENT)
Age: 39
Setting detail: OUTPATIENT SURGERY
Discharge: HOME | End: 2019-11-21
Attending: OBSTETRICS & GYNECOLOGY | Admitting: OBSTETRICS & GYNECOLOGY
Payer: OTHER GOVERNMENT

## 2019-11-21 ENCOUNTER — ANESTHESIA EVENT (OUTPATIENT)
Dept: OPERATING ROOM | Age: 39
End: 2019-11-21
Payer: OTHER GOVERNMENT

## 2019-11-21 ENCOUNTER — ANESTHESIA (OUTPATIENT)
Dept: OPERATING ROOM | Age: 39
End: 2019-11-21
Payer: OTHER GOVERNMENT

## 2019-11-21 VITALS
DIASTOLIC BLOOD PRESSURE: 68 MMHG | HEART RATE: 95 BPM | SYSTOLIC BLOOD PRESSURE: 102 MMHG | RESPIRATION RATE: 16 BRPM | BODY MASS INDEX: 28.27 KG/M2 | HEIGHT: 60 IN | WEIGHT: 144 LBS | TEMPERATURE: 98.4 F | OXYGEN SATURATION: 94 %

## 2019-11-21 VITALS
SYSTOLIC BLOOD PRESSURE: 102 MMHG | DIASTOLIC BLOOD PRESSURE: 73 MMHG | RESPIRATION RATE: 5 BRPM | OXYGEN SATURATION: 91 %

## 2019-11-21 DIAGNOSIS — G43.719 CHRONIC MIGRAINE WITHOUT AURA, INTRACTABLE, WITHOUT STATUS MIGRAINOSUS: ICD-10-CM

## 2019-11-21 DIAGNOSIS — N92.0 MENORRHAGIA WITH REGULAR CYCLE: Primary | ICD-10-CM

## 2019-11-21 LAB
ABO/RH: NORMAL
ANTIBODY SCREEN: NORMAL
HCG(URINE) PREGNANCY TEST: NEGATIVE

## 2019-11-21 PROCEDURE — 88307 TISSUE EXAM BY PATHOLOGIST: CPT

## 2019-11-21 PROCEDURE — 2580000003 HC RX 258: Performed by: ANESTHESIOLOGY

## 2019-11-21 PROCEDURE — 3600000009 HC SURGERY ROBOT BASE: Performed by: OBSTETRICS & GYNECOLOGY

## 2019-11-21 PROCEDURE — 6360000002 HC RX W HCPCS: Performed by: ANESTHESIOLOGY

## 2019-11-21 PROCEDURE — 6370000000 HC RX 637 (ALT 250 FOR IP): Performed by: ANESTHESIOLOGY

## 2019-11-21 PROCEDURE — 2720000010 HC SURG SUPPLY STERILE: Performed by: OBSTETRICS & GYNECOLOGY

## 2019-11-21 PROCEDURE — 86850 RBC ANTIBODY SCREEN: CPT

## 2019-11-21 PROCEDURE — 36415 COLL VENOUS BLD VENIPUNCTURE: CPT

## 2019-11-21 PROCEDURE — 86901 BLOOD TYPING SEROLOGIC RH(D): CPT

## 2019-11-21 PROCEDURE — 7100000011 HC PHASE II RECOVERY - ADDTL 15 MIN: Performed by: OBSTETRICS & GYNECOLOGY

## 2019-11-21 PROCEDURE — 3600000019 HC SURGERY ROBOT ADDTL 15MIN: Performed by: OBSTETRICS & GYNECOLOGY

## 2019-11-21 PROCEDURE — 3700000000 HC ANESTHESIA ATTENDED CARE: Performed by: OBSTETRICS & GYNECOLOGY

## 2019-11-21 PROCEDURE — 2500000003 HC RX 250 WO HCPCS: Performed by: OBSTETRICS & GYNECOLOGY

## 2019-11-21 PROCEDURE — 6360000002 HC RX W HCPCS: Performed by: NURSE ANESTHETIST, CERTIFIED REGISTERED

## 2019-11-21 PROCEDURE — 86900 BLOOD TYPING SEROLOGIC ABO: CPT

## 2019-11-21 PROCEDURE — 6370000000 HC RX 637 (ALT 250 FOR IP): Performed by: OBSTETRICS & GYNECOLOGY

## 2019-11-21 PROCEDURE — S2900 ROBOTIC SURGICAL SYSTEM: HCPCS | Performed by: OBSTETRICS & GYNECOLOGY

## 2019-11-21 PROCEDURE — 2500000003 HC RX 250 WO HCPCS: Performed by: NURSE ANESTHETIST, CERTIFIED REGISTERED

## 2019-11-21 PROCEDURE — 7100000010 HC PHASE II RECOVERY - FIRST 15 MIN: Performed by: OBSTETRICS & GYNECOLOGY

## 2019-11-21 PROCEDURE — 58552 LAPARO-VAG HYST INCL T/O: CPT | Performed by: OBSTETRICS & GYNECOLOGY

## 2019-11-21 PROCEDURE — 7100000001 HC PACU RECOVERY - ADDTL 15 MIN: Performed by: OBSTETRICS & GYNECOLOGY

## 2019-11-21 PROCEDURE — 84703 CHORIONIC GONADOTROPIN ASSAY: CPT

## 2019-11-21 PROCEDURE — 3700000001 HC ADD 15 MINUTES (ANESTHESIA): Performed by: OBSTETRICS & GYNECOLOGY

## 2019-11-21 PROCEDURE — 2709999900 HC NON-CHARGEABLE SUPPLY: Performed by: OBSTETRICS & GYNECOLOGY

## 2019-11-21 PROCEDURE — 2780000010 HC IMPLANT OTHER: Performed by: OBSTETRICS & GYNECOLOGY

## 2019-11-21 PROCEDURE — 7100000000 HC PACU RECOVERY - FIRST 15 MIN: Performed by: OBSTETRICS & GYNECOLOGY

## 2019-11-21 DEVICE — AGENT HEMSTAT HUM FBRN SEAL EVICEL KT: Type: IMPLANTABLE DEVICE | Site: PELVIS | Status: FUNCTIONAL

## 2019-11-21 RX ORDER — SCOLOPAMINE TRANSDERMAL SYSTEM 1 MG/1
1 PATCH, EXTENDED RELEASE TRANSDERMAL
Status: DISCONTINUED | OUTPATIENT
Start: 2019-11-21 | End: 2019-11-21 | Stop reason: HOSPADM

## 2019-11-21 RX ORDER — PROMETHAZINE HYDROCHLORIDE 25 MG/ML
6.25 INJECTION, SOLUTION INTRAMUSCULAR; INTRAVENOUS
Status: COMPLETED | OUTPATIENT
Start: 2019-11-21 | End: 2019-11-21

## 2019-11-21 RX ORDER — GENTAMICIN SULFATE 80 MG/100ML
80 INJECTION, SOLUTION INTRAVENOUS
Status: DISCONTINUED | OUTPATIENT
Start: 2019-11-21 | End: 2019-11-21 | Stop reason: HOSPADM

## 2019-11-21 RX ORDER — ENALAPRILAT 2.5 MG/2ML
1.25 INJECTION INTRAVENOUS
Status: DISCONTINUED | OUTPATIENT
Start: 2019-11-21 | End: 2019-11-21 | Stop reason: HOSPADM

## 2019-11-21 RX ORDER — LIDOCAINE HYDROCHLORIDE 10 MG/ML
1 INJECTION, SOLUTION EPIDURAL; INFILTRATION; INTRACAUDAL; PERINEURAL
Status: DISCONTINUED | OUTPATIENT
Start: 2019-11-21 | End: 2019-11-21 | Stop reason: HOSPADM

## 2019-11-21 RX ORDER — DIPHENHYDRAMINE HYDROCHLORIDE 50 MG/ML
12.5 INJECTION INTRAMUSCULAR; INTRAVENOUS
Status: DISCONTINUED | OUTPATIENT
Start: 2019-11-21 | End: 2019-11-21 | Stop reason: HOSPADM

## 2019-11-21 RX ORDER — ONDANSETRON 2 MG/ML
INJECTION INTRAMUSCULAR; INTRAVENOUS PRN
Status: DISCONTINUED | OUTPATIENT
Start: 2019-11-21 | End: 2019-11-21 | Stop reason: SDUPTHER

## 2019-11-21 RX ORDER — PHENAZOPYRIDINE HYDROCHLORIDE 100 MG/1
100 TABLET, FILM COATED ORAL ONCE
Status: COMPLETED | OUTPATIENT
Start: 2019-11-21 | End: 2019-11-21

## 2019-11-21 RX ORDER — FENTANYL CITRATE 50 UG/ML
25 INJECTION, SOLUTION INTRAMUSCULAR; INTRAVENOUS
Status: DISCONTINUED | OUTPATIENT
Start: 2019-11-21 | End: 2019-11-21 | Stop reason: HOSPADM

## 2019-11-21 RX ORDER — ROCURONIUM BROMIDE 10 MG/ML
INJECTION, SOLUTION INTRAVENOUS PRN
Status: DISCONTINUED | OUTPATIENT
Start: 2019-11-21 | End: 2019-11-21 | Stop reason: SDUPTHER

## 2019-11-21 RX ORDER — MORPHINE SULFATE 4 MG/ML
4 INJECTION, SOLUTION INTRAMUSCULAR; INTRAVENOUS EVERY 5 MIN PRN
Status: DISCONTINUED | OUTPATIENT
Start: 2019-11-21 | End: 2019-11-21 | Stop reason: HOSPADM

## 2019-11-21 RX ORDER — KETAMINE HYDROCHLORIDE 50 MG/ML
INJECTION, SOLUTION, CONCENTRATE INTRAMUSCULAR; INTRAVENOUS PRN
Status: DISCONTINUED | OUTPATIENT
Start: 2019-11-21 | End: 2019-11-21 | Stop reason: SDUPTHER

## 2019-11-21 RX ORDER — MORPHINE SULFATE 4 MG/ML
2 INJECTION, SOLUTION INTRAMUSCULAR; INTRAVENOUS EVERY 5 MIN PRN
Status: DISCONTINUED | OUTPATIENT
Start: 2019-11-21 | End: 2019-11-21 | Stop reason: HOSPADM

## 2019-11-21 RX ORDER — SODIUM CHLORIDE, SODIUM LACTATE, POTASSIUM CHLORIDE, CALCIUM CHLORIDE 600; 310; 30; 20 MG/100ML; MG/100ML; MG/100ML; MG/100ML
INJECTION, SOLUTION INTRAVENOUS CONTINUOUS
Status: DISCONTINUED | OUTPATIENT
Start: 2019-11-21 | End: 2019-11-21 | Stop reason: HOSPADM

## 2019-11-21 RX ORDER — LIDOCAINE HYDROCHLORIDE 10 MG/ML
INJECTION, SOLUTION INFILTRATION; PERINEURAL PRN
Status: DISCONTINUED | OUTPATIENT
Start: 2019-11-21 | End: 2019-11-21 | Stop reason: SDUPTHER

## 2019-11-21 RX ORDER — DEXAMETHASONE SODIUM PHOSPHATE 4 MG/ML
INJECTION, SOLUTION INTRA-ARTICULAR; INTRALESIONAL; INTRAMUSCULAR; INTRAVENOUS; SOFT TISSUE PRN
Status: DISCONTINUED | OUTPATIENT
Start: 2019-11-21 | End: 2019-11-21 | Stop reason: SDUPTHER

## 2019-11-21 RX ORDER — METOCLOPRAMIDE HYDROCHLORIDE 5 MG/ML
10 INJECTION INTRAMUSCULAR; INTRAVENOUS
Status: DISCONTINUED | OUTPATIENT
Start: 2019-11-21 | End: 2019-11-21 | Stop reason: HOSPADM

## 2019-11-21 RX ORDER — OXYCODONE HYDROCHLORIDE AND ACETAMINOPHEN 5; 325 MG/1; MG/1
1 TABLET ORAL EVERY 6 HOURS PRN
Qty: 28 TABLET | Refills: 0 | Status: SHIPPED | OUTPATIENT
Start: 2019-11-21 | End: 2019-11-28

## 2019-11-21 RX ORDER — MIDAZOLAM HYDROCHLORIDE 1 MG/ML
2 INJECTION INTRAMUSCULAR; INTRAVENOUS
Status: DISCONTINUED | OUTPATIENT
Start: 2019-11-21 | End: 2019-11-21 | Stop reason: HOSPADM

## 2019-11-21 RX ORDER — LABETALOL 20 MG/4 ML (5 MG/ML) INTRAVENOUS SYRINGE
5 EVERY 10 MIN PRN
Status: DISCONTINUED | OUTPATIENT
Start: 2019-11-21 | End: 2019-11-21 | Stop reason: HOSPADM

## 2019-11-21 RX ORDER — APREPITANT 40 MG/1
40 CAPSULE ORAL ONCE
Status: COMPLETED | OUTPATIENT
Start: 2019-11-21 | End: 2019-11-21

## 2019-11-21 RX ORDER — HYDRALAZINE HYDROCHLORIDE 20 MG/ML
5 INJECTION INTRAMUSCULAR; INTRAVENOUS EVERY 10 MIN PRN
Status: DISCONTINUED | OUTPATIENT
Start: 2019-11-21 | End: 2019-11-21 | Stop reason: HOSPADM

## 2019-11-21 RX ORDER — SODIUM CHLORIDE 0.9 % (FLUSH) 0.9 %
10 SYRINGE (ML) INJECTION EVERY 12 HOURS SCHEDULED
Status: DISCONTINUED | OUTPATIENT
Start: 2019-11-21 | End: 2019-11-21 | Stop reason: HOSPADM

## 2019-11-21 RX ORDER — PROPOFOL 10 MG/ML
INJECTION, EMULSION INTRAVENOUS PRN
Status: DISCONTINUED | OUTPATIENT
Start: 2019-11-21 | End: 2019-11-21 | Stop reason: SDUPTHER

## 2019-11-21 RX ORDER — SODIUM CHLORIDE 0.9 % (FLUSH) 0.9 %
10 SYRINGE (ML) INJECTION PRN
Status: DISCONTINUED | OUTPATIENT
Start: 2019-11-21 | End: 2019-11-21 | Stop reason: HOSPADM

## 2019-11-21 RX ORDER — SUCCINYLCHOLINE/SOD CL,ISO/PF 100 MG/5ML
SYRINGE (ML) INTRAVENOUS PRN
Status: DISCONTINUED | OUTPATIENT
Start: 2019-11-21 | End: 2019-11-21 | Stop reason: SDUPTHER

## 2019-11-21 RX ORDER — SODIUM CHLORIDE 9 MG/ML
INJECTION, SOLUTION INTRAVENOUS CONTINUOUS
Status: DISCONTINUED | OUTPATIENT
Start: 2019-11-21 | End: 2019-11-21 | Stop reason: HOSPADM

## 2019-11-21 RX ORDER — GLYCOPYRROLATE 1 MG/5 ML
SYRINGE (ML) INTRAVENOUS PRN
Status: DISCONTINUED | OUTPATIENT
Start: 2019-11-21 | End: 2019-11-21 | Stop reason: SDUPTHER

## 2019-11-21 RX ORDER — KETOROLAC TROMETHAMINE 30 MG/ML
INJECTION, SOLUTION INTRAMUSCULAR; INTRAVENOUS PRN
Status: DISCONTINUED | OUTPATIENT
Start: 2019-11-21 | End: 2019-11-21 | Stop reason: SDUPTHER

## 2019-11-21 RX ORDER — FENTANYL CITRATE 50 UG/ML
INJECTION, SOLUTION INTRAMUSCULAR; INTRAVENOUS PRN
Status: DISCONTINUED | OUTPATIENT
Start: 2019-11-21 | End: 2019-11-21 | Stop reason: SDUPTHER

## 2019-11-21 RX ORDER — CLINDAMYCIN PHOSPHATE 600 MG/50ML
600 INJECTION INTRAVENOUS
Status: DISCONTINUED | OUTPATIENT
Start: 2019-11-21 | End: 2019-11-21 | Stop reason: HOSPADM

## 2019-11-21 RX ORDER — MIDAZOLAM HYDROCHLORIDE 1 MG/ML
INJECTION INTRAMUSCULAR; INTRAVENOUS PRN
Status: DISCONTINUED | OUTPATIENT
Start: 2019-11-21 | End: 2019-11-21 | Stop reason: SDUPTHER

## 2019-11-21 RX ORDER — FENTANYL CITRATE 50 UG/ML
50 INJECTION, SOLUTION INTRAMUSCULAR; INTRAVENOUS
Status: DISCONTINUED | OUTPATIENT
Start: 2019-11-21 | End: 2019-11-21 | Stop reason: HOSPADM

## 2019-11-21 RX ORDER — MEPERIDINE HYDROCHLORIDE 50 MG/ML
12.5 INJECTION INTRAMUSCULAR; INTRAVENOUS; SUBCUTANEOUS EVERY 5 MIN PRN
Status: DISCONTINUED | OUTPATIENT
Start: 2019-11-21 | End: 2019-11-21 | Stop reason: HOSPADM

## 2019-11-21 RX ADMIN — SODIUM CHLORIDE, SODIUM LACTATE, POTASSIUM CHLORIDE, AND CALCIUM CHLORIDE: 600; 310; 30; 20 INJECTION, SOLUTION INTRAVENOUS at 13:17

## 2019-11-21 RX ADMIN — CLINDAMYCIN PHOSPHATE 600 MG: 600 INJECTION, SOLUTION INTRAVENOUS at 09:19

## 2019-11-21 RX ADMIN — HYDROMORPHONE HYDROCHLORIDE 0.5 MG: 1 INJECTION, SOLUTION INTRAMUSCULAR; INTRAVENOUS; SUBCUTANEOUS at 13:09

## 2019-11-21 RX ADMIN — SUGAMMADEX 131 MG: 100 INJECTION, SOLUTION INTRAVENOUS at 13:45

## 2019-11-21 RX ADMIN — PROPOFOL 100 MG: 10 INJECTION, EMULSION INTRAVENOUS at 12:05

## 2019-11-21 RX ADMIN — ONDANSETRON HYDROCHLORIDE 4 MG: 2 INJECTION, SOLUTION INTRAMUSCULAR; INTRAVENOUS at 12:05

## 2019-11-21 RX ADMIN — HYDROMORPHONE HYDROCHLORIDE 0.5 MG: 1 INJECTION, SOLUTION INTRAMUSCULAR; INTRAVENOUS; SUBCUTANEOUS at 13:27

## 2019-11-21 RX ADMIN — APREPITANT 40 MG: 40 CAPSULE ORAL at 09:17

## 2019-11-21 RX ADMIN — ROCURONIUM BROMIDE 50 MG: 10 SOLUTION INTRAVENOUS at 12:14

## 2019-11-21 RX ADMIN — ONDANSETRON HYDROCHLORIDE 4 MG: 2 INJECTION, SOLUTION INTRAMUSCULAR; INTRAVENOUS at 13:27

## 2019-11-21 RX ADMIN — LIDOCAINE HYDROCHLORIDE 50 MG: 10 INJECTION, SOLUTION INFILTRATION; PERINEURAL at 12:05

## 2019-11-21 RX ADMIN — MIDAZOLAM 2 MG: 1 INJECTION INTRAMUSCULAR; INTRAVENOUS at 12:04

## 2019-11-21 RX ADMIN — FENTANYL CITRATE 100 MCG: 50 INJECTION INTRAMUSCULAR; INTRAVENOUS at 12:05

## 2019-11-21 RX ADMIN — PHENAZOPYRIDINE 100 MG: 100 TABLET ORAL at 09:17

## 2019-11-21 RX ADMIN — DEXAMETHASONE SODIUM PHOSPHATE 4 MG: 4 INJECTION, SOLUTION INTRAMUSCULAR; INTRAVENOUS at 12:06

## 2019-11-21 RX ADMIN — ROCURONIUM BROMIDE 10 MG: 10 SOLUTION INTRAVENOUS at 12:41

## 2019-11-21 RX ADMIN — Medication 20 MG: at 12:10

## 2019-11-21 RX ADMIN — KETOROLAC TROMETHAMINE 15 MG: 30 INJECTION, SOLUTION INTRAMUSCULAR; INTRAVENOUS at 13:29

## 2019-11-21 RX ADMIN — MEPERIDINE HYDROCHLORIDE 25 MG: 50 INJECTION INTRAMUSCULAR; INTRAVENOUS; SUBCUTANEOUS at 13:56

## 2019-11-21 RX ADMIN — Medication 0.2 MG: at 12:05

## 2019-11-21 RX ADMIN — SODIUM CHLORIDE, SODIUM LACTATE, POTASSIUM CHLORIDE, AND CALCIUM CHLORIDE: 600; 310; 30; 20 INJECTION, SOLUTION INTRAVENOUS at 09:19

## 2019-11-21 RX ADMIN — ROCURONIUM BROMIDE 5 MG: 10 SOLUTION INTRAVENOUS at 12:05

## 2019-11-21 RX ADMIN — CLINDAMYCIN PHOSPHATE 600 MG: 600 INJECTION, SOLUTION INTRAVENOUS at 12:06

## 2019-11-21 RX ADMIN — PROMETHAZINE HYDROCHLORIDE 6.25 MG: 25 INJECTION INTRAMUSCULAR; INTRAVENOUS at 13:55

## 2019-11-21 RX ADMIN — Medication 120 MG: at 12:05

## 2019-11-21 ASSESSMENT — PAIN DESCRIPTION - FREQUENCY: FREQUENCY: CONTINUOUS

## 2019-11-21 ASSESSMENT — PAIN DESCRIPTION - ONSET: ONSET: AWAKENED FROM SLEEP

## 2019-11-21 ASSESSMENT — PAIN DESCRIPTION - LOCATION
LOCATION: ABDOMEN
LOCATION: ABDOMEN

## 2019-11-21 ASSESSMENT — PAIN SCALES - GENERAL
PAINLEVEL_OUTOF10: 5
PAINLEVEL_OUTOF10: 8

## 2019-11-21 ASSESSMENT — PAIN - FUNCTIONAL ASSESSMENT: PAIN_FUNCTIONAL_ASSESSMENT: 0-10

## 2019-11-21 ASSESSMENT — PAIN DESCRIPTION - ORIENTATION
ORIENTATION: LOWER
ORIENTATION: LOWER

## 2019-11-21 ASSESSMENT — PAIN DESCRIPTION - PAIN TYPE
TYPE: SURGICAL PAIN
TYPE: SURGICAL PAIN

## 2019-11-21 ASSESSMENT — PAIN DESCRIPTION - DESCRIPTORS
DESCRIPTORS: CRAMPING
DESCRIPTORS: CRAMPING

## 2019-11-24 ENCOUNTER — APPOINTMENT (OUTPATIENT)
Dept: GENERAL RADIOLOGY | Age: 39
End: 2019-11-24
Payer: OTHER GOVERNMENT

## 2019-11-24 ENCOUNTER — HOSPITAL ENCOUNTER (EMERGENCY)
Age: 39
Discharge: HOME | End: 2019-11-24
Attending: EMERGENCY MEDICINE
Payer: OTHER GOVERNMENT

## 2019-11-24 VITALS
TEMPERATURE: 98.3 F | DIASTOLIC BLOOD PRESSURE: 70 MMHG | WEIGHT: 144 LBS | RESPIRATION RATE: 18 BRPM | SYSTOLIC BLOOD PRESSURE: 108 MMHG | BODY MASS INDEX: 28.27 KG/M2 | OXYGEN SATURATION: 96 % | HEIGHT: 60 IN | HEART RATE: 80 BPM

## 2019-11-24 DIAGNOSIS — G89.18 POSTOPERATIVE ABDOMINAL PAIN: ICD-10-CM

## 2019-11-24 DIAGNOSIS — R10.9 POSTOPERATIVE ABDOMINAL PAIN: ICD-10-CM

## 2019-11-24 DIAGNOSIS — K56.7 ILEUS, POSTOPERATIVE (HCC): Primary | ICD-10-CM

## 2019-11-24 DIAGNOSIS — R11.0 NAUSEA: ICD-10-CM

## 2019-11-24 DIAGNOSIS — K91.89 ILEUS, POSTOPERATIVE (HCC): Primary | ICD-10-CM

## 2019-11-24 LAB
ALBUMIN SERPL-MCNC: 3.7 G/DL (ref 3.5–5.2)
ALP BLD-CCNC: 81 U/L (ref 35–104)
ALT SERPL-CCNC: 14 U/L (ref 5–33)
ANION GAP SERPL CALCULATED.3IONS-SCNC: 14 MMOL/L (ref 7–19)
AST SERPL-CCNC: 16 U/L (ref 5–32)
BASOPHILS ABSOLUTE: 0 K/UL (ref 0–0.2)
BASOPHILS RELATIVE PERCENT: 0.2 % (ref 0–1)
BILIRUB SERPL-MCNC: 0.5 MG/DL (ref 0.2–1.2)
BILIRUBIN URINE: NEGATIVE
BLOOD, URINE: NEGATIVE
BUN BLDV-MCNC: 12 MG/DL (ref 6–20)
CALCIUM SERPL-MCNC: 9.3 MG/DL (ref 8.6–10)
CHLORIDE BLD-SCNC: 98 MMOL/L (ref 98–111)
CLARITY: CLEAR
CO2: 22 MMOL/L (ref 22–29)
COLOR: YELLOW
CREAT SERPL-MCNC: 0.6 MG/DL (ref 0.5–0.9)
EKG P AXIS: 34 DEGREES
EKG P-R INTERVAL: 112 MS
EKG Q-T INTERVAL: 392 MS
EKG QRS DURATION: 78 MS
EKG QTC CALCULATION (BAZETT): 409 MS
EKG T AXIS: 17 DEGREES
EOSINOPHILS ABSOLUTE: 0.1 K/UL (ref 0–0.6)
EOSINOPHILS RELATIVE PERCENT: 0.4 % (ref 0–5)
GFR NON-AFRICAN AMERICAN: >60
GLUCOSE BLD-MCNC: 103 MG/DL (ref 74–109)
GLUCOSE URINE: NEGATIVE MG/DL
HCT VFR BLD CALC: 40.9 % (ref 37–47)
HEMOGLOBIN: 13.6 G/DL (ref 12–16)
IMMATURE GRANULOCYTES #: 0.1 K/UL
KETONES, URINE: NEGATIVE MG/DL
LEUKOCYTE ESTERASE, URINE: NEGATIVE
LYMPHOCYTES ABSOLUTE: 2 K/UL (ref 1.1–4.5)
LYMPHOCYTES RELATIVE PERCENT: 14.3 % (ref 20–40)
MCH RBC QN AUTO: 30.4 PG (ref 27–31)
MCHC RBC AUTO-ENTMCNC: 33.3 G/DL (ref 33–37)
MCV RBC AUTO: 91.3 FL (ref 81–99)
MONOCYTES ABSOLUTE: 0.6 K/UL (ref 0–0.9)
MONOCYTES RELATIVE PERCENT: 4.5 % (ref 0–10)
NEUTROPHILS ABSOLUTE: 11.1 K/UL (ref 1.5–7.5)
NEUTROPHILS RELATIVE PERCENT: 80.2 % (ref 50–65)
NITRITE, URINE: NEGATIVE
PDW BLD-RTO: 12.7 % (ref 11.5–14.5)
PH UA: 8 (ref 5–8)
PLATELET # BLD: 278 K/UL (ref 130–400)
PMV BLD AUTO: 8.9 FL (ref 9.4–12.3)
POTASSIUM REFLEX MAGNESIUM: 4 MMOL/L (ref 3.5–5)
PROTEIN UA: NEGATIVE MG/DL
RBC # BLD: 4.48 M/UL (ref 4.2–5.4)
SODIUM BLD-SCNC: 134 MMOL/L (ref 136–145)
SPECIFIC GRAVITY UA: 1.01 (ref 1–1.03)
TOTAL PROTEIN: 7.4 G/DL (ref 6.6–8.7)
UROBILINOGEN, URINE: 0.2 E.U./DL
WBC # BLD: 13.9 K/UL (ref 4.8–10.8)

## 2019-11-24 PROCEDURE — 81003 URINALYSIS AUTO W/O SCOPE: CPT

## 2019-11-24 PROCEDURE — 99284 EMERGENCY DEPT VISIT MOD MDM: CPT

## 2019-11-24 PROCEDURE — 2580000003 HC RX 258: Performed by: EMERGENCY MEDICINE

## 2019-11-24 PROCEDURE — 99999 PR OFFICE/OUTPT VISIT,PROCEDURE ONLY: CPT | Performed by: EMERGENCY MEDICINE

## 2019-11-24 PROCEDURE — 93005 ELECTROCARDIOGRAM TRACING: CPT | Performed by: EMERGENCY MEDICINE

## 2019-11-24 PROCEDURE — 93010 ELECTROCARDIOGRAM REPORT: CPT | Performed by: INTERNAL MEDICINE

## 2019-11-24 PROCEDURE — 85025 COMPLETE CBC W/AUTO DIFF WBC: CPT

## 2019-11-24 PROCEDURE — 36415 COLL VENOUS BLD VENIPUNCTURE: CPT

## 2019-11-24 PROCEDURE — 71046 X-RAY EXAM CHEST 2 VIEWS: CPT

## 2019-11-24 PROCEDURE — 6360000002 HC RX W HCPCS: Performed by: EMERGENCY MEDICINE

## 2019-11-24 PROCEDURE — 96375 TX/PRO/DX INJ NEW DRUG ADDON: CPT

## 2019-11-24 PROCEDURE — 80053 COMPREHEN METABOLIC PANEL: CPT

## 2019-11-24 PROCEDURE — 96374 THER/PROPH/DIAG INJ IV PUSH: CPT

## 2019-11-24 RX ORDER — METOCLOPRAMIDE HYDROCHLORIDE 5 MG/ML
10 INJECTION INTRAMUSCULAR; INTRAVENOUS ONCE
Status: COMPLETED | OUTPATIENT
Start: 2019-11-24 | End: 2019-11-24

## 2019-11-24 RX ORDER — MORPHINE SULFATE 4 MG/ML
4 INJECTION, SOLUTION INTRAMUSCULAR; INTRAVENOUS ONCE
Status: COMPLETED | OUTPATIENT
Start: 2019-11-24 | End: 2019-11-24

## 2019-11-24 RX ORDER — SODIUM CHLORIDE, SODIUM LACTATE, POTASSIUM CHLORIDE, CALCIUM CHLORIDE 600; 310; 30; 20 MG/100ML; MG/100ML; MG/100ML; MG/100ML
1000 INJECTION, SOLUTION INTRAVENOUS ONCE
Status: COMPLETED | OUTPATIENT
Start: 2019-11-24 | End: 2019-11-24

## 2019-11-24 RX ORDER — METOCLOPRAMIDE 10 MG/1
10 TABLET ORAL
Qty: 20 TABLET | Refills: 0 | Status: SHIPPED | OUTPATIENT
Start: 2019-11-24 | End: 2020-03-11

## 2019-11-24 RX ADMIN — MORPHINE SULFATE 4 MG: 4 INJECTION INTRAVENOUS at 08:29

## 2019-11-24 RX ADMIN — METOCLOPRAMIDE 10 MG: 5 INJECTION, SOLUTION INTRAMUSCULAR; INTRAVENOUS at 08:30

## 2019-11-24 RX ADMIN — SODIUM CHLORIDE, POTASSIUM CHLORIDE, SODIUM LACTATE AND CALCIUM CHLORIDE 1000 ML: 600; 310; 30; 20 INJECTION, SOLUTION INTRAVENOUS at 08:29

## 2019-11-24 ASSESSMENT — ENCOUNTER SYMPTOMS
DIARRHEA: 0
RHINORRHEA: 0
EYE PAIN: 0
COUGH: 0
SHORTNESS OF BREATH: 0
ABDOMINAL PAIN: 1
EYE REDNESS: 0
VOMITING: 0
VOICE CHANGE: 0
NAUSEA: 1

## 2019-11-24 ASSESSMENT — PAIN DESCRIPTION - LOCATION: LOCATION: ABDOMEN

## 2019-11-24 ASSESSMENT — PAIN SCALES - GENERAL
PAINLEVEL_OUTOF10: 0
PAINLEVEL_OUTOF10: 8

## 2019-11-24 ASSESSMENT — PAIN DESCRIPTION - PAIN TYPE: TYPE: ACUTE PAIN

## 2019-11-24 ASSESSMENT — PAIN DESCRIPTION - DESCRIPTORS: DESCRIPTORS: SHOOTING;CRAMPING

## 2019-12-01 ENCOUNTER — HOSPITAL ENCOUNTER (EMERGENCY)
Age: 39
Discharge: HOME | End: 2019-12-01
Payer: OTHER GOVERNMENT

## 2019-12-01 ENCOUNTER — APPOINTMENT (OUTPATIENT)
Dept: GENERAL RADIOLOGY | Age: 39
End: 2019-12-01
Payer: OTHER GOVERNMENT

## 2019-12-01 ENCOUNTER — APPOINTMENT (OUTPATIENT)
Dept: CT IMAGING | Age: 39
End: 2019-12-01
Payer: OTHER GOVERNMENT

## 2019-12-01 VITALS
HEIGHT: 63 IN | BODY MASS INDEX: 24.8 KG/M2 | OXYGEN SATURATION: 98 % | DIASTOLIC BLOOD PRESSURE: 76 MMHG | SYSTOLIC BLOOD PRESSURE: 122 MMHG | RESPIRATION RATE: 20 BRPM | WEIGHT: 140 LBS | HEART RATE: 78 BPM | TEMPERATURE: 98 F

## 2019-12-01 DIAGNOSIS — G43.009 MIGRAINE WITHOUT AURA AND WITHOUT STATUS MIGRAINOSUS, NOT INTRACTABLE: Primary | ICD-10-CM

## 2019-12-01 DIAGNOSIS — K59.00 CONSTIPATION, UNSPECIFIED CONSTIPATION TYPE: ICD-10-CM

## 2019-12-01 LAB
ALBUMIN SERPL-MCNC: 3.4 G/DL (ref 3.5–5.2)
ALP BLD-CCNC: 81 U/L (ref 35–104)
ALT SERPL-CCNC: 20 U/L (ref 5–33)
ANION GAP SERPL CALCULATED.3IONS-SCNC: 15 MMOL/L (ref 7–19)
AST SERPL-CCNC: 25 U/L (ref 5–32)
BASOPHILS ABSOLUTE: 0 K/UL (ref 0–0.2)
BASOPHILS RELATIVE PERCENT: 0.4 % (ref 0–1)
BILIRUB SERPL-MCNC: <0.2 MG/DL (ref 0.2–1.2)
BUN BLDV-MCNC: 17 MG/DL (ref 6–20)
CALCIUM SERPL-MCNC: 9.3 MG/DL (ref 8.6–10)
CHLORIDE BLD-SCNC: 99 MMOL/L (ref 98–111)
CO2: 22 MMOL/L (ref 22–29)
CREAT SERPL-MCNC: 0.6 MG/DL (ref 0.5–0.9)
EOSINOPHILS ABSOLUTE: 0.2 K/UL (ref 0–0.6)
EOSINOPHILS RELATIVE PERCENT: 2 % (ref 0–5)
GFR NON-AFRICAN AMERICAN: >60
GLUCOSE BLD-MCNC: 133 MG/DL (ref 74–109)
HCT VFR BLD CALC: 36.6 % (ref 37–47)
HEMOGLOBIN: 12.1 G/DL (ref 12–16)
IMMATURE GRANULOCYTES #: 0.1 K/UL
LIPASE: 76 U/L (ref 13–60)
LYMPHOCYTES ABSOLUTE: 2.7 K/UL (ref 1.1–4.5)
LYMPHOCYTES RELATIVE PERCENT: 25.1 % (ref 20–40)
MCH RBC QN AUTO: 30.2 PG (ref 27–31)
MCHC RBC AUTO-ENTMCNC: 33.1 G/DL (ref 33–37)
MCV RBC AUTO: 91.3 FL (ref 81–99)
MONOCYTES ABSOLUTE: 0.6 K/UL (ref 0–0.9)
MONOCYTES RELATIVE PERCENT: 5.4 % (ref 0–10)
NEUTROPHILS ABSOLUTE: 7 K/UL (ref 1.5–7.5)
NEUTROPHILS RELATIVE PERCENT: 66.6 % (ref 50–65)
PDW BLD-RTO: 12.2 % (ref 11.5–14.5)
PLATELET # BLD: 372 K/UL (ref 130–400)
PMV BLD AUTO: 8.8 FL (ref 9.4–12.3)
POTASSIUM REFLEX MAGNESIUM: 3.6 MMOL/L (ref 3.5–5)
RBC # BLD: 4.01 M/UL (ref 4.2–5.4)
SODIUM BLD-SCNC: 136 MMOL/L (ref 136–145)
TOTAL PROTEIN: 7.3 G/DL (ref 6.6–8.7)
WBC # BLD: 10.5 K/UL (ref 4.8–10.8)

## 2019-12-01 PROCEDURE — 70450 CT HEAD/BRAIN W/O DYE: CPT

## 2019-12-01 PROCEDURE — 36415 COLL VENOUS BLD VENIPUNCTURE: CPT

## 2019-12-01 PROCEDURE — 96375 TX/PRO/DX INJ NEW DRUG ADDON: CPT

## 2019-12-01 PROCEDURE — 85025 COMPLETE CBC W/AUTO DIFF WBC: CPT

## 2019-12-01 PROCEDURE — 99284 EMERGENCY DEPT VISIT MOD MDM: CPT

## 2019-12-01 PROCEDURE — 83690 ASSAY OF LIPASE: CPT

## 2019-12-01 PROCEDURE — 80053 COMPREHEN METABOLIC PANEL: CPT

## 2019-12-01 PROCEDURE — 74018 RADEX ABDOMEN 1 VIEW: CPT

## 2019-12-01 PROCEDURE — 6360000002 HC RX W HCPCS: Performed by: NURSE PRACTITIONER

## 2019-12-01 PROCEDURE — 93005 ELECTROCARDIOGRAM TRACING: CPT | Performed by: NURSE PRACTITIONER

## 2019-12-01 PROCEDURE — 2580000003 HC RX 258: Performed by: NURSE PRACTITIONER

## 2019-12-01 PROCEDURE — 96374 THER/PROPH/DIAG INJ IV PUSH: CPT

## 2019-12-01 RX ORDER — DIPHENHYDRAMINE HYDROCHLORIDE 50 MG/ML
25 INJECTION INTRAMUSCULAR; INTRAVENOUS ONCE
Status: COMPLETED | OUTPATIENT
Start: 2019-12-01 | End: 2019-12-01

## 2019-12-01 RX ORDER — 0.9 % SODIUM CHLORIDE 0.9 %
1000 INTRAVENOUS SOLUTION INTRAVENOUS ONCE
Status: COMPLETED | OUTPATIENT
Start: 2019-12-01 | End: 2019-12-01

## 2019-12-01 RX ORDER — DOCUSATE SODIUM 100 MG/1
100 CAPSULE, LIQUID FILLED ORAL 2 TIMES DAILY PRN
Qty: 30 CAPSULE | Refills: 0 | Status: SHIPPED | OUTPATIENT
Start: 2019-12-01 | End: 2020-07-08

## 2019-12-01 RX ORDER — ONDANSETRON 2 MG/ML
4 INJECTION INTRAMUSCULAR; INTRAVENOUS ONCE
Status: COMPLETED | OUTPATIENT
Start: 2019-12-01 | End: 2019-12-01

## 2019-12-01 RX ORDER — MORPHINE SULFATE 4 MG/ML
4 INJECTION, SOLUTION INTRAMUSCULAR; INTRAVENOUS ONCE
Status: COMPLETED | OUTPATIENT
Start: 2019-12-01 | End: 2019-12-01

## 2019-12-01 RX ADMIN — ONDANSETRON 4 MG: 2 INJECTION INTRAMUSCULAR; INTRAVENOUS at 00:55

## 2019-12-01 RX ADMIN — SODIUM CHLORIDE 1000 ML: 9 INJECTION, SOLUTION INTRAVENOUS at 00:55

## 2019-12-01 RX ADMIN — MORPHINE SULFATE 4 MG: 4 INJECTION, SOLUTION INTRAMUSCULAR; INTRAVENOUS at 00:56

## 2019-12-01 RX ADMIN — DIPHENHYDRAMINE HYDROCHLORIDE 25 MG: 50 INJECTION, SOLUTION INTRAMUSCULAR; INTRAVENOUS at 00:56

## 2019-12-01 ASSESSMENT — ENCOUNTER SYMPTOMS
PHOTOPHOBIA: 0
STRIDOR: 0
BACK PAIN: 0
SINUS PAIN: 0
SORE THROAT: 0
EYE DISCHARGE: 0
ALLERGIC/IMMUNOLOGIC NEGATIVE: 1
EYE ITCHING: 0
TROUBLE SWALLOWING: 0
EYE PAIN: 0
EYE REDNESS: 0
BLOOD IN STOOL: 0
CHEST TIGHTNESS: 0
DIARRHEA: 0
COLOR CHANGE: 0
ABDOMINAL DISTENTION: 0
VOMITING: 1
ABDOMINAL PAIN: 0
NAUSEA: 1
CONSTIPATION: 1
WHEEZING: 0
SHORTNESS OF BREATH: 0

## 2019-12-01 ASSESSMENT — PAIN SCALES - GENERAL
PAINLEVEL_OUTOF10: 8
PAINLEVEL_OUTOF10: 0
PAINLEVEL_OUTOF10: 6

## 2019-12-01 ASSESSMENT — PAIN DESCRIPTION - LOCATION: LOCATION: HEAD

## 2019-12-02 LAB
EKG P AXIS: 66 DEGREES
EKG P-R INTERVAL: 130 MS
EKG Q-T INTERVAL: 422 MS
EKG QRS DURATION: 80 MS
EKG QTC CALCULATION (BAZETT): 427 MS
EKG T AXIS: 65 DEGREES

## 2019-12-02 PROCEDURE — 93010 ELECTROCARDIOGRAM REPORT: CPT | Performed by: INTERNAL MEDICINE

## 2019-12-02 RX ORDER — ERENUMAB-AOOE 70 MG/ML
INJECTION SUBCUTANEOUS
Qty: 1 ML | Refills: 12 | Status: SHIPPED | OUTPATIENT
Start: 2019-12-02 | End: 2021-05-06

## 2019-12-05 ENCOUNTER — OFFICE VISIT (OUTPATIENT)
Dept: OBGYN | Age: 39
End: 2019-12-05

## 2019-12-05 VITALS
DIASTOLIC BLOOD PRESSURE: 71 MMHG | WEIGHT: 139 LBS | BODY MASS INDEX: 25.58 KG/M2 | HEIGHT: 62 IN | SYSTOLIC BLOOD PRESSURE: 104 MMHG | HEART RATE: 73 BPM

## 2019-12-05 DIAGNOSIS — F41.9 ANXIETY: Primary | ICD-10-CM

## 2019-12-05 DIAGNOSIS — Z09 POSTOP CHECK: Primary | ICD-10-CM

## 2019-12-05 PROCEDURE — 99024 POSTOP FOLLOW-UP VISIT: CPT | Performed by: NURSE PRACTITIONER

## 2019-12-05 RX ORDER — LORAZEPAM 1 MG/1
1 TABLET ORAL DAILY PRN
Qty: 30 TABLET | Refills: 0 | Status: SHIPPED | OUTPATIENT
Start: 2019-12-05 | End: 2020-03-11 | Stop reason: SDUPTHER

## 2019-12-05 ASSESSMENT — ENCOUNTER SYMPTOMS
ALLERGIC/IMMUNOLOGIC NEGATIVE: 1
EYES NEGATIVE: 1
DIARRHEA: 0
GASTROINTESTINAL NEGATIVE: 1
RESPIRATORY NEGATIVE: 1
CONSTIPATION: 0

## 2019-12-12 RX ORDER — ROPINIROLE 0.5 MG/1
TABLET, FILM COATED ORAL
Qty: 90 TABLET | Refills: 12 | Status: SHIPPED | OUTPATIENT
Start: 2019-12-12 | End: 2021-03-17

## 2019-12-12 RX ORDER — CITALOPRAM 20 MG/1
TABLET ORAL
Qty: 30 TABLET | Refills: 12 | Status: SHIPPED | OUTPATIENT
Start: 2019-12-12 | End: 2020-03-11 | Stop reason: SDUPTHER

## 2020-01-02 ENCOUNTER — OFFICE VISIT (OUTPATIENT)
Dept: OBGYN | Age: 40
End: 2020-01-02

## 2020-01-02 VITALS
HEIGHT: 60 IN | BODY MASS INDEX: 28.66 KG/M2 | WEIGHT: 146 LBS | SYSTOLIC BLOOD PRESSURE: 125 MMHG | HEART RATE: 87 BPM | DIASTOLIC BLOOD PRESSURE: 87 MMHG

## 2020-01-02 DIAGNOSIS — R53.83 OTHER FATIGUE: ICD-10-CM

## 2020-01-02 LAB
BASOPHILS ABSOLUTE: 0.1 K/UL (ref 0–0.2)
BASOPHILS RELATIVE PERCENT: 0.6 % (ref 0–1)
EOSINOPHILS ABSOLUTE: 0.1 K/UL (ref 0–0.6)
EOSINOPHILS RELATIVE PERCENT: 1.1 % (ref 0–5)
HCT VFR BLD CALC: 41.7 % (ref 37–47)
HEMOGLOBIN: 13.6 G/DL (ref 12–16)
IMMATURE GRANULOCYTES #: 0 K/UL
LYMPHOCYTES ABSOLUTE: 2.6 K/UL (ref 1.1–4.5)
LYMPHOCYTES RELATIVE PERCENT: 32.8 % (ref 20–40)
MCH RBC QN AUTO: 30.3 PG (ref 27–31)
MCHC RBC AUTO-ENTMCNC: 32.6 G/DL (ref 33–37)
MCV RBC AUTO: 92.9 FL (ref 81–99)
MONOCYTES ABSOLUTE: 0.4 K/UL (ref 0–0.9)
MONOCYTES RELATIVE PERCENT: 4.6 % (ref 0–10)
NEUTROPHILS ABSOLUTE: 4.9 K/UL (ref 1.5–7.5)
NEUTROPHILS RELATIVE PERCENT: 60.7 % (ref 50–65)
PDW BLD-RTO: 13 % (ref 11.5–14.5)
PLATELET # BLD: 285 K/UL (ref 130–400)
PMV BLD AUTO: 9.6 FL (ref 9.4–12.3)
RBC # BLD: 4.49 M/UL (ref 4.2–5.4)
TSH SERPL DL<=0.05 MIU/L-ACNC: 1.03 UIU/ML (ref 0.27–4.2)
VITAMIN D 25-HYDROXY: 35 NG/ML
WBC # BLD: 8.1 K/UL (ref 4.8–10.8)

## 2020-01-02 PROCEDURE — 99024 POSTOP FOLLOW-UP VISIT: CPT | Performed by: NURSE PRACTITIONER

## 2020-01-02 ASSESSMENT — ENCOUNTER SYMPTOMS
DIARRHEA: 0
RESPIRATORY NEGATIVE: 1
GASTROINTESTINAL NEGATIVE: 1
ALLERGIC/IMMUNOLOGIC NEGATIVE: 1
EYES NEGATIVE: 1
CONSTIPATION: 0

## 2020-01-02 NOTE — PROGRESS NOTES
Dedrick Fowler is a 44 y.o. female who presents today for her medical conditions/ complaints as noted below. Dedrick Fowler is c/o of Post-Op Check (hysterectomy)        HPI  Pt presents for 6 week f/u for TLH. Doing well- no pain or bleeding. Reports regular bowel and bladder function. C/o fatigue since surgery, feels tired all the time. Patient's last menstrual period was 10/19/2019. No obstetric history on file. Past Medical History:   Diagnosis Date    Anxiety     Bell's palsy     Chronic migraine without aura, intractable, without status migrainosus 8/10/2017    Depression     Headache     Insomnia     Intractable migraine without aura and without status migrainosus 8/10/2017    Irritable bowel syndrome     Kidney stone 10/2017    Osteoarthritis     Periodic limb movement disorder (PLMD)     Snoring     PSG, 7/2016-mild     Past Surgical History:   Procedure Laterality Date    ANKLE SURGERY Right     CYSTOURETHROSCOPY/STENT REMOVAL      HYSTERECTOMY N/A 11/21/2019    TOTAL LAPAROSCOPIC HYSTERECTOMY WITH DAVINCI CYSTOSCOPY performed by Guillermina Henderson MD at [address]      X 2     Family History   Problem Relation Age of Onset    Osteoporosis Mother      Social History     Tobacco Use    Smoking status: Never Smoker    Smokeless tobacco: Never Used   Substance Use Topics    Alcohol use: Yes     Comment: RARELY       Current Outpatient Medications   Medication Sig Dispense Refill    rOPINIRole (REQUIP) 0.5 MG tablet TAKE 1 TABLET THREE TIMES A DAY 90 tablet 12    citalopram (CELEXA) 20 MG tablet TAKE 1 TABLET DAILY 30 tablet 12    LORazepam (ATIVAN) 1 MG tablet Take 1 tablet by mouth daily as needed for Anxiety for up to 30 days.  30 tablet 0    AIMOVIG 70 MG/ML SOAJ INJECT 70 MG (ONE AUTO INJECTOR) INTO THE SKIN EVERY 30 DAYS 1 mL 12    docusate sodium (COLACE) 100 MG capsule Take 1 capsule by mouth 2 times daily as needed for Constipation 30 capsule 0    metoclopramide (REGLAN) 10 MG tablet Take 1 tablet by mouth every 6-8 hours as needed (nausea and vomiting) (Patient taking differently: Take 10 mg by mouth every 6-8 hours as needed (nausea and vomiting) Indications: PRN ) 20 tablet 0    Simethicone 80 MG TABS Take 80 mg by mouth 3 times daily (Patient taking differently: Take 80 mg by mouth 3 times daily Indications: PRN ) 20 tablet 0    valACYclovir (VALTREX) 1 g tablet Take 2,000 mg by mouth 2 times daily as needed (PRN) Indications: COLD SORES       Plecanatide (TRULANCE) 3 MG TABS Take 1 tablet by mouth daily      omeprazole (PRILOSEC) 40 MG delayed release capsule Take 40 mg by mouth daily       glycopyrrolate (ROBINUL) 2 MG tablet Take 2 mg by mouth 2 times daily       No current facility-administered medications for this visit. Allergies   Allergen Reactions    Doxycycline     Minocycline     Pcn [Penicillins] Palpitations     Vitals:    01/02/20 1444   BP: 125/87   Pulse: 87     Body mass index is 28.51 kg/m². Review of Systems   Constitutional: Positive for fatigue. HENT: Negative. Eyes: Negative. Respiratory: Negative. Cardiovascular: Negative. Gastrointestinal: Negative. Negative for constipation and diarrhea. Endocrine: Negative. Genitourinary: Negative. Negative for frequency, menstrual problem and urgency. Musculoskeletal: Negative. Skin: Positive for wound. Allergic/Immunologic: Negative. Neurological: Negative. Hematological: Negative. Psychiatric/Behavioral: Negative. All other systems reviewed and are negative. Physical Exam  Vitals signs and nursing note reviewed. Constitutional:       Appearance: She is well-developed. HENT:      Head: Normocephalic. Right Ear: External ear normal.      Left Ear: External ear normal.      Nose: Nose normal.   Neck:      Musculoskeletal: Normal range of motion.    Abdominal:      Comments: Lap sites x4 clean, dry and intact  BS active x4 quads   Genitourinary:     Comments: Vaginal cuff palpates smooth  No sutures present, no granulation tissue    Musculoskeletal: Normal range of motion. Skin:     General: Skin is warm and dry. Neurological:      Mental Status: She is alert and oriented to person, place, and time. Psychiatric:         Attention and Perception: Attention normal.         Mood and Affect: Mood normal.         Speech: Speech normal.         Behavior: Behavior normal.         Thought Content: Thought content normal.         Cognition and Memory: Cognition normal.         Judgment: Judgment normal.          Diagnosis Orders   1. Postop check     2. Other fatigue  Vitamin D 25 Hydroxy    CBC Auto Differential    TSH without Reflex       MEDICATIONS:  No orders of the defined types were placed in this encounter. ORDERS:  Orders Placed This Encounter   Procedures    Vitamin D 25 Hydroxy    CBC Auto Differential    TSH without Reflex       PLAN:  Vaginal cuff intact, no sutures present  Ok to return to normal ADLs  Checking labs for fatigue    Patient Instructions   We are committed to providing you with the best care possible. In order to help us achieve these goals please remember to bring all medications, herbal products, and over the counter supplements with you to each visit. If your provider has ordered testing for you, please be sure to follow up with our office if you have not received results within 7 days after testing took place. *If you receive a survey after visiting one of our offices, please take the time to share your experience concerning your physician office visit. These surveys are confidential and no health information about you is shared. We are eager to improve for you and we are counting on your feedback to help make that happen.

## 2020-01-06 ENCOUNTER — OFFICE VISIT (OUTPATIENT)
Dept: PRIMARY CARE CLINIC | Age: 40
End: 2020-01-06
Payer: OTHER GOVERNMENT

## 2020-01-06 VITALS
RESPIRATION RATE: 20 BRPM | HEART RATE: 74 BPM | OXYGEN SATURATION: 98 % | DIASTOLIC BLOOD PRESSURE: 76 MMHG | WEIGHT: 146 LBS | SYSTOLIC BLOOD PRESSURE: 108 MMHG | TEMPERATURE: 98.4 F | BODY MASS INDEX: 28.66 KG/M2 | HEIGHT: 60 IN

## 2020-01-06 PROCEDURE — 99213 OFFICE O/P EST LOW 20 MIN: CPT | Performed by: FAMILY MEDICINE

## 2020-01-06 RX ORDER — CYCLOBENZAPRINE HCL 5 MG
5 TABLET ORAL 3 TIMES DAILY PRN
Qty: 30 TABLET | Refills: 0 | Status: SHIPPED | OUTPATIENT
Start: 2020-01-06 | End: 2020-01-16

## 2020-01-06 RX ORDER — IBUPROFEN 600 MG/1
600 TABLET ORAL 3 TIMES DAILY PRN
Qty: 90 TABLET | Refills: 0 | Status: SHIPPED | OUTPATIENT
Start: 2020-01-06 | End: 2021-07-20 | Stop reason: SDUPTHER

## 2020-01-06 RX ORDER — TRAZODONE HYDROCHLORIDE 100 MG/1
100 TABLET ORAL NIGHTLY
COMMUNITY
End: 2020-07-23

## 2020-01-06 ASSESSMENT — PATIENT HEALTH QUESTIONNAIRE - PHQ9
2. FEELING DOWN, DEPRESSED OR HOPELESS: 0
SUM OF ALL RESPONSES TO PHQ9 QUESTIONS 1 & 2: 0
SUM OF ALL RESPONSES TO PHQ QUESTIONS 1-9: 0
1. LITTLE INTEREST OR PLEASURE IN DOING THINGS: 0
SUM OF ALL RESPONSES TO PHQ QUESTIONS 1-9: 0

## 2020-01-10 ASSESSMENT — ENCOUNTER SYMPTOMS
NAUSEA: 0
BACK PAIN: 1
ABDOMINAL PAIN: 0
EYE DISCHARGE: 0
DIARRHEA: 0
WHEEZING: 0
VOMITING: 0
COUGH: 0
COLOR CHANGE: 0

## 2020-01-10 NOTE — PROGRESS NOTES
SUBJECTIVE:    Patient ID: Hector Dacosta is a 44 y.o. female. HPI:   Patient is here today because she is having problems with left leg pain and numbness. She states that it is worse if she is up on it. She does have a history of sciatica. She states that she has not really done anything to cause her symptoms. She states that if she moves in a certain position that it is worse. She states that it is worse if she is standing for long periods or if she is sitting for long periods. She states that she has not really done anything for it other than taking Aleve to help with the symptoms. She states that it has helped some. She denies any problems with bowel or bladder incontinence. She states that the pain moves from her left lower back down to her left calf. She states that it is a burning pain as well as some numbness and tingling.     Past Medical History:   Diagnosis Date    Anxiety     Bell's palsy     Chronic migraine without aura, intractable, without status migrainosus 8/10/2017    Depression     Headache     Insomnia     Intractable migraine without aura and without status migrainosus 8/10/2017    Irritable bowel syndrome     Kidney stone 10/2017    Osteoarthritis     Periodic limb movement disorder (PLMD)     Snoring     PSG, 7/2016-mild      Current Outpatient Medications   Medication Sig Dispense Refill    traZODone (DESYREL) 100 MG tablet Take 100 mg by mouth nightly      cyclobenzaprine (FLEXERIL) 5 MG tablet Take 1 tablet by mouth 3 times daily as needed for Muscle spasms 30 tablet 0    ibuprofen (ADVIL;MOTRIN) 600 MG tablet Take 1 tablet by mouth 3 times daily as needed for Pain 90 tablet 0    rOPINIRole (REQUIP) 0.5 MG tablet TAKE 1 TABLET THREE TIMES A DAY 90 tablet 12    citalopram (CELEXA) 20 MG tablet TAKE 1 TABLET DAILY 30 tablet 12    AIMOVIG 70 MG/ML SOAJ INJECT 70 MG (ONE AUTO INJECTOR) INTO THE SKIN EVERY 30 DAYS 1 mL 12    docusate sodium (COLACE) 100 MG capsule

## 2020-01-13 ENCOUNTER — TELEPHONE (OUTPATIENT)
Dept: PRIMARY CARE CLINIC | Age: 40
End: 2020-01-13

## 2020-01-22 ENCOUNTER — HOSPITAL ENCOUNTER (OUTPATIENT)
Dept: PHYSICAL THERAPY | Age: 40
Setting detail: THERAPIES SERIES
Discharge: HOME | End: 2020-01-22
Payer: OTHER GOVERNMENT

## 2020-01-22 PROCEDURE — 97162 PT EVAL MOD COMPLEX 30 MIN: CPT

## 2020-01-22 ASSESSMENT — PAIN DESCRIPTION - LOCATION: LOCATION: BACK;HIP;BUTTOCKS

## 2020-01-22 ASSESSMENT — PAIN DESCRIPTION - FREQUENCY: FREQUENCY: INTERMITTENT

## 2020-01-22 ASSESSMENT — PAIN DESCRIPTION - ORIENTATION: ORIENTATION: LOWER;LEFT

## 2020-01-22 ASSESSMENT — PAIN SCALES - GENERAL: PAINLEVEL_OUTOF10: 2

## 2020-01-22 NOTE — PROGRESS NOTES
Physical Therapy  Initial Assessment  Date: 2020  Patient Name: Allison Walker  MRN: [id number]  : 1980     Treatment Diagnosis: L sided sciatica    Restrictions       Subjective   General  Chart Reviewed: Yes  Patient assessed for rehabilitation services?: Yes  Additional Pertinent Hx: 45 y/o F presents with low back pain. PMH includes recent hysterectomy, R ankle sx, migraines, hx [place] palsy  Response To Previous Treatment: Not applicable  Family / Caregiver Present: No  Referring Practitioner: Sruthi Godinez MD  Referral Date : 20  Diagnosis: L sided sciatica  Follows Commands: Within Functional Limits  General Comment  Comments: Goes by [employer]"  PT Visit Information  PT Insurance Information: [address] (No precert req'd)  Total # of Visits to Date: 1  Plan of Care/Certification Expiration Date: 20  Progress Note Due Date: 20  Subjective  Subjective: Pt presents with off and on pain x at least 3 months, after sitting to paint a room (leaning on L.)  Has radiating pain down LLE, none in R. Pain increases with sitting, especially on L buttocks. Also increased by standing in place. Relieved by heating pad, hot showers. Some relief from muscle relaxer and ibuprofen. Also relieved by frequent position changes. Denies an injury to back. Has previously done PT for general low back pain, with known mild OA in lumbar area. Has occasional \"giving out\" of LLE. Pain Screening  Patient Currently in Pain: Yes  Pain Assessment  Pain Assessment: 0-10  Pain Level: 2(6/10 at worst)  Pain Location: Back;Hip;Buttocks  Pain Orientation: Lower; Left  Pain Descriptors: Radiating;Numbness;Tingling  Pain Frequency: Intermittent  Vital Signs  Patient Currently in Pain: Yes    Vision/Hearing  Vision  Vision: Within Functional Limits  Hearing  Hearing: Within functional limits    Orientation  Orientation  Overall Orientation Status: Within Normal Limits    Social/Functional History  Social/Functional History  Lives With: Spouse  ADL Assistance: Independent  Homemaking Assistance: Independent  Type of occupation:  for court. Frequent walking, stairs, etc    Objective     Observation/Palpation  Posture: Fair  Palpation: TTP throughout L glute area, L PSIS. No TTP at lumbar paraspinals, and no unexpected increase of muscle tension in that area. Observation: Stands throughout most of evaluation, with weight shift to RLE. When sitting, leans to R in order to offload L buttocks. LLE longer than R, with L ASIS rotated anteriorly. Spine  Lumbar: 60 degrees flexion, 25 degrees extension, 15 degrees SB R, 20 degrees SB L, 100% rotation bilaterally    Strength RLE  R Hip Flexion: 5/5  R Hip ABduction: 5/5  R Hip ADduction: 5/5  R Knee Flexion: 5/5  R Knee Extension: 5/5  R Ankle Dorsiflexion: 5/5  R Ankle Plantar flexion: 5/5  Strength LLE  L Hip Flexion: 5/5(5-/5)  L Hip ABduction: 5/5  L Hip ADduction: 5/5  L Knee Flexion: 5/5  L Knee Extension: 5/5  L Ankle Dorsiflexion: 5/5  L Ankle Plantar Flexion: 5/5  Strength Other  Other: Fair pelvic tilt (with max cues), poor TA contraction     Additional Measures  Flexibility: L HS length to 65 degrees, R HS length to 85 degrees. Hip ER equal bilaterally  Special Tests: No change in symptoms with pelvic compression. Trialed SI belt but pt reported increased discomfort after ambulating.   Other: Oswestry Disability Questionnaire: 26% impairment  Sensation  Overall Sensation Status: WFL(Intermittent N/T in LLE)             Ambulation  Ambulation?: Yes  Ambulation 1  Surface: level tile  Device: No Device  Assistance: Independent  Quality of Gait: Anterior pelvic tilt, small step length     Exercises  Exercise 1: Assess leg length and correct as needed  Exercise 2: Isometric L hip extension from SKTC 5 x 5\"  Exercise 3: Isometric R hip flexion from 90/90 5 x 5\"  Exercise 4: TA contraction (alone, UE, LE, UE/LE)  Exercise 5: Pelvic tilt  Exercise 6: Hooklying lumbar rotation to R  Exercise 7: Supine L quadratus stretch  Exercise 8: Supine L piriformis stretch  Exercise 9: Bilat SKTC  Exercise 10: DKTC  Exercise 11: Bridging  Exercise 12: Single leg bridging L  Exercise 13: Axial traction bilat LE  Exercise 14: Sitting fwd reach to R  Exercise 15: Repeated sit to stand with TA contraction  Exercise 16: Multifidus row  Exercise 17: Paloff press  Exercise 18: Standing marches  Exercise 19: Standing hip abd/ext  Exercise 20: Standing lumbar rotation R with L glute squeeze------------------- If high pain, can use U/S to L PSIS area                      Assessment   Conditions Requiring Skilled Therapeutic Intervention  Body structures, Functions, Activity limitations: Decreased high-level IADLs;Decreased posture;Decreased sensation;Decreased strength; Increased pain  Assessment: Pt presents with L sided low back pain and LLE N/T. Displays decreased core strength and flexibility, as well as pelvic obliquity. Will benefit from skilled PT intervention to address listed impairments.   Treatment Diagnosis: L sided sciatica  Prognosis: Good  Decision Making: Medium Complexity  History: See above  REQUIRES PT FOLLOW UP: Yes  Discharge Recommendations: Continue to assess pending progress  Activity Tolerance  Activity Tolerance: Patient Tolerated treatment well         Plan   Plan  Times per week: 2x  Plan weeks: 4-6 weeks  Current Treatment Recommendations: Strengthening, Manual Therapy - Joint Manipulation, Patient/Caregiver Education & Training, Equipment Evaluation, Education, & procurement, ROM, Modalities, Neuromuscular Re-education, Home Exercise Program, Manual Therapy - Soft Tissue Mobilization, Safety Education & Training      Goals  Short term goals  Time Frame for Short term goals: 3-4 weeks  Short term goal 1: Independent with HEP  Short term goal 2: Perform 10 Fair+ quality TA contractions  Short term goal 3: Improve L HS flexibility to at least 80 degrees  Short term goal 4: If needed, trial heel lift  Long term goals  Time Frame for Long term goals : 4-6 weeks  Long term goal 1: Report decreased frequency of LLE radicular symptoms. Long term goal 2: Report improved tolerance to sitting with equal WB. Long term goal 3: Improve Oswestry score to 18% impairment or less.   Patient Goals   Patient goals : reduce L low back pain       Therapy Time   Individual Concurrent Group Co-treatment   Time In 1550         Time Out 6740         Minutes [address] [place] [address] Donato Ballard, PT

## 2020-01-31 ENCOUNTER — HOSPITAL ENCOUNTER (OUTPATIENT)
Dept: PHYSICAL THERAPY | Age: 40
Setting detail: THERAPIES SERIES
Discharge: HOME | End: 2020-01-31
Payer: OTHER GOVERNMENT

## 2020-01-31 ENCOUNTER — OFFICE VISIT (OUTPATIENT)
Dept: NEUROLOGY | Age: 40
End: 2020-01-31
Payer: OTHER GOVERNMENT

## 2020-01-31 VITALS
SYSTOLIC BLOOD PRESSURE: 121 MMHG | WEIGHT: 147 LBS | HEIGHT: 60 IN | HEART RATE: 80 BPM | DIASTOLIC BLOOD PRESSURE: 81 MMHG | OXYGEN SATURATION: 98 % | BODY MASS INDEX: 28.86 KG/M2

## 2020-01-31 PROCEDURE — 97110 THERAPEUTIC EXERCISES: CPT

## 2020-01-31 PROCEDURE — 99213 OFFICE O/P EST LOW 20 MIN: CPT | Performed by: PHYSICIAN ASSISTANT

## 2020-01-31 RX ORDER — CYCLOBENZAPRINE HCL 5 MG
5 TABLET ORAL 3 TIMES DAILY PRN
COMMUNITY
End: 2020-11-25

## 2020-01-31 NOTE — PROGRESS NOTES

## 2020-01-31 NOTE — PROGRESS NOTES
[facility] Follow Up Encounter      Information:   Patient Name: Marui Pyle  :   1980  Age:   44 y.o. MRN:   [id number]  Account #:  [de-identified]  Date:              20    Provider:  Raphael Carey PA-C    Chief Complaint   Patient presents with    Migraine     pt states Damionbhaskar Shaws is still working well       Subjective:   Mauri Pyle is a 44 y.o. female  with a history of migraines, snoring, and PLMD who comes in for a follow up. who comes in for a follow up. At her last office visit, she was to continue Damion Novoa and received a sample voucher for Zomig nasal spray. The migraines are stable as noted below: The PLMD is stable. Migraine: (review, 20)  Pain location:  frontal  Onset:  Early   Character:  Throbbing  Timing: The most recent migraine was at the beginning of December  Progression: Improved  Similar to prior headaches:  Yes  Associated:   Nausea, vomiting, photophobia, and phonophobia  Aggravated:  Menses, allergies, or heat   Treatments tried:  Sleep and a dark room; Fioricet; cold gel mask; Excedrin, Topamax, Maxalt, Relpax, Periactin; hot shower; She is currently taking Aimovig and if needed she will take an ibuprophen  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  Alleviated: Usually Relpax but she sometimes has to take 2;  Aimovig       Objective:     Past Medical History:   Diagnosis Date    Anxiety     Bell's palsy     Chronic migraine without aura, intractable, without status migrainosus 8/10/2017    Depression     Headache     Insomnia     Intractable migraine without aura and without status migrainosus 8/10/2017    Irritable bowel syndrome     Kidney stone 10/2017    Osteoarthritis     Periodic limb movement disorder (PLMD)     Snoring     PSG, 2016-mild       Past Surgical History:   Procedure Laterality Date    ANKLE SURGERY Right     CYSTOURETHROSCOPY/STENT 100 mg by mouth nightly      ibuprofen (ADVIL;MOTRIN) 600 MG tablet Take 1 tablet by mouth 3 times daily as needed for Pain 90 tablet 0    rOPINIRole (REQUIP) 0.5 MG tablet TAKE 1 TABLET THREE TIMES A DAY 90 tablet 12    citalopram (CELEXA) 20 MG tablet TAKE 1 TABLET DAILY 30 tablet 12    AIMOVIG 70 MG/ML SOAJ INJECT 70 MG (ONE AUTO INJECTOR) INTO THE SKIN EVERY 30 DAYS 1 mL 12    docusate sodium (COLACE) 100 MG capsule Take 1 capsule by mouth 2 times daily as needed for Constipation 30 capsule 0    Simethicone 80 MG TABS Take 80 mg by mouth 3 times daily (Patient taking differently: Take 80 mg by mouth 3 times daily Indications: PRN ) 20 tablet 0    valACYclovir (VALTREX) 1 g tablet Take 2,000 mg by mouth 2 times daily as needed (PRN) Indications: COLD SORES       Plecanatide (TRULANCE) 3 MG TABS Take 1 tablet by mouth daily      omeprazole (PRILOSEC) 40 MG delayed release capsule Take 40 mg by mouth daily       glycopyrrolate (ROBINUL) 2 MG tablet Take 2 mg by mouth 2 times daily      metoclopramide (REGLAN) 10 MG tablet Take 1 tablet by mouth every 6-8 hours as needed (nausea and vomiting) (Patient not taking: Reported on 1/31/2020) 20 tablet 0     No current facility-administered medications for this visit. Allergies: Allergies   Allergen Reactions    Doxycycline     Minocycline     Pcn [Penicillins] Palpitations     REVIEW OF SYSTEMS     Constitutional: []? Fever []? Sweats []? Chills []? Recent Injury   [x]? Denies all unless marked  HENT:[x]? Headache  []? Head Injury  []? Sore Throat  []? Ear Pain  []? Dizziness []? Hearing Loss   [x]? Denies all unless marked  Spine:  []? Neck pain  []? Back pain  []? Sciaticia  [x]? Denies all unless marked  Cardiovascular:[]? Chest Pain []? Palpitations []? Heart Disease  [x]? Denies all unless marked  Pulmonary: []? Shortness of Breath []? Cough   [x]? Denies all unless marked  Gastrointestinal:  []? Abdominal Pain  []? Blood in Stool  []? Diarrhea

## 2020-01-31 NOTE — PROGRESS NOTES
squeeze x 5     ------------------- If high pain, can use U/S to L PSIS area         Assessment:   Conditions Requiring Skilled Therapeutic Intervention  Body structures, Functions, Activity limitations: Decreased high-level IADLs;Decreased posture;Decreased sensation;Decreased strength; Increased pain  Assessment: Initiated exericses per evaluating therapist.  Richy Sidhu performed at low reps to test tolerance. She was able to complete all exeircses on program and had slight increase of pain post session. Treatment Diagnosis: L sided sciatica  Prognosis: Good  Decision Making: Medium Complexity  REQUIRES PT FOLLOW UP: Yes  Discharge Recommendations: Continue to assess pending progress      Goals:  Short term goals  Time Frame for Short term goals: 3-4 weeks  Short term goal 1: Independent with HEP  Short term goal 2: Perform 10 Fair+ quality TA contractions  Short term goal 3: Improve L HS flexibility to at least 80 degrees  Short term goal 4: If needed, trial heel lift  Long term goals  Time Frame for Long term goals : 4-6 weeks  Long term goal 1: Report decreased frequency of LLE radicular symptoms. Long term goal 2: Report improved tolerance to sitting with equal WB. Long term goal 3: Improve Oswestry score to 18% impairment or less.   Patient Goals   Patient goals : reduce L low back pain    Plan:    Plan  Times per week: 2x  Plan weeks: 4-6 weeks  Current Treatment Recommendations: Strengthening, Manual Therapy - Joint Manipulation, Patient/Caregiver Education & Training, Equipment Evaluation, Education, & procurement, ROM, Modalities, Neuromuscular Re-education, Home Exercise Program, Manual Therapy - Soft Tissue Mobilization, Safety Education & Training        Therapy Time   Individual Concurrent Group Co-treatment   Time In 2664         Time Out 1612         Minutes [address], [facility]    Electronically signed by Isaac Gardner PTA on 1/31/2020 at 4:17 PM

## 2020-01-31 NOTE — PATIENT INSTRUCTIONS
symptoms. Auras may also involve other senses and can occasionally cause temporary muscle weakness or changes in speech; these symptoms can be frightening. Aura symptoms typically last five to 20 minutes and rarely last more than 60 minutes. The headache occurs soon after the aura stops. Muscle-related auras may last longer. MIGRAINE HEADACHE TRIGGERS -- Migraines can be triggered by stress, worry, menstrual periods, birth control pills, physical exertion, fatigue, lack of sleep, hunger, head trauma, and certain foods or drinks that contain chemicals such as nitrites, glutamate, aspartate, tyramine. Certain medications and chemicals can also trigger a migraine, including nitroglycerin (used to treat chest pain), estrogens, hydralazine (used to treat high blood pressure), perfumes, smoke, and organic solvents with a strong odor. Headache diary -- People who have frequent or severe headaches may benefit from keeping a headache diary over the course of one month. This can be used to determine what triggers the migraines and what makes them better. MIGRAINE HEADACHE TREATMENT TYPES -- Migraine headache treatment depends upon the frequency, severity, and symptoms of your headache. ? Acute treatment refers to medicines you can take when you have a headache to relieve the pain immediately. ?Preventive treatment refers to medicines you can take on a regular (usually daily) basis to prevent headaches in the future. Acute treatment -- The pain of migraines can be tough to get rid of. Treatment is most likely to work if you take it at the first sign of an attack (eg, at the first sign of aura if one occurs, or when pain begins). In some people, an aura occurs before the migraine (see 'Aura' above). Therefore, an aura can serve as a reliable warning that a migraine headache is on the way, and should be the signal to take migraine medication. (See \"Acute treatment of migraine in adults\". )  Pain relievers -- Mild migraine attacks may respond to pain relievers, some of which are available without a prescription. These drugs include:  ? Aspirin  ? Acetaminophen  ? Nonsteroidal anti-inflammatory drugs (NSAIDs) such as ibuprofen, indomethacin, or naproxen  ? Indomethacin is a prescription medicine that comes in a rectal suppository, which may be useful for people who have nausea during their headaches. Pain relievers are also available in combination with caffeine, which enhances their antimigraine effect. As an example, some pain relievers contain a combination of acetaminophen, aspirin, and caffeine. Pain relievers are often recommended first for mild to moderate migraine attacks. However, they should not be used too often because overuse can lead to medication-overuse headaches or chronic daily headaches. If you respond to a pain reliever, continue taking it with each attack, as long as you do not take it more than once or twice per week. People with gastritis (inflammation of the stomach), ulcers, kidney disease, and bleeding conditions should not take products containing aspirin or NSAIDs. Anti-nausea medications -- If you have nausea and vomiting with a migraine, you can take an anti-nausea medicine by injection or rectal suppository. In some cases, antimigraine drugs can be taken in combination with drugs that alleviate nausea and vomiting, such as metoclopramide or prochlorperazine. Anti-nausea medications given by mouth are usually used in combination with other medications to treat acute migraine. However, anti-nausea medications can be given alone in the hospital by intravenous and intramuscular administration to treat acute migraine headache. Triptans -- If a pain reliever does not control your migraine pain, most healthcare providers will recommend a treatment that is migraine-specific. This includes a class of medications called triptans.  Examples of triptans are sumatriptan, zolmitriptan, naratriptan, scheduling interventions; therefore, women with irregular cycles are not good candidates for these options. Coexisting problems, such as dysmenorrhea, menorrhagia, endometriosis, as well as contraception needs may influence choice of preventive therapy. A preventive treatment may be useful for women who have menstrual migraines on a predictable schedule. This treatment strategy is called \"mini-prophylaxis\". ?Nonsteroidal antiinflammatory drugs (NSAIDs) such as ibuprofen or naproxen are one option for mini-prophylaxis of menstrual migraine. ?Triptans such as frovatriptan, sumatriptan, or naratriptan are another option. Typically, long-acting triptans are dosed twice daily beginning two days before anticipated menses and continued for five days. Hormonal treatments may be recommended to prevent menstrual migraines. One approach is to use estrogen-progestin contraceptive pills in an extended cycle; another choice is menstrually-targeted supplemental estrogen. These treatments work by preventing a rapid decline in the level of estrogen in the body before the menstrual period, which is believed to trigger the migraine. However, some experts avoid treatment with estrogen-progestin contraceptives for women who have a menstrual migraine with aura. Others consider the use of such treatment only for healthy women younger than age 28 who do not have focal neurologic signs and who do not smoke. WHERE TO GET MORE INFORMATION -- Your healthcare provider is the best source of information for questions and concerns related to your medical problem    Organizations  [employer]  Non-profit organization dedicated to service headache sufferers, their families, and the healthcare practitioners who treat them. Promotes research into headache causes and treatments and educates the public. [address] [place] [address] Amina Chaudhry 6838   [email or web address] org   [email or web address] org   Tel: [phone]

## 2020-02-05 ENCOUNTER — HOSPITAL ENCOUNTER (OUTPATIENT)
Dept: PHYSICAL THERAPY | Age: 40
Setting detail: THERAPIES SERIES
Discharge: HOME | End: 2020-02-05
Payer: OTHER GOVERNMENT

## 2020-02-05 PROCEDURE — 97110 THERAPEUTIC EXERCISES: CPT

## 2020-02-05 ASSESSMENT — PAIN DESCRIPTION - LOCATION: LOCATION: BACK;LEG;HIP

## 2020-02-05 ASSESSMENT — PAIN DESCRIPTION - PAIN TYPE: TYPE: CHRONIC PAIN

## 2020-02-05 ASSESSMENT — PAIN DESCRIPTION - ORIENTATION: ORIENTATION: LEFT;LOWER

## 2020-02-05 ASSESSMENT — PAIN SCALES - GENERAL: PAINLEVEL_OUTOF10: 3

## 2020-02-07 ENCOUNTER — HOSPITAL ENCOUNTER (OUTPATIENT)
Dept: PHYSICAL THERAPY | Age: 40
Setting detail: THERAPIES SERIES
Discharge: HOME | End: 2020-02-07
Payer: OTHER GOVERNMENT

## 2020-02-07 PROCEDURE — 97110 THERAPEUTIC EXERCISES: CPT

## 2020-02-07 ASSESSMENT — PAIN DESCRIPTION - PAIN TYPE: TYPE: CHRONIC PAIN

## 2020-02-07 ASSESSMENT — PAIN SCALES - GENERAL: PAINLEVEL_OUTOF10: 1

## 2020-02-07 NOTE — PROGRESS NOTES
Physical Therapy  Daily Treatment Note  Date: 2020  Patient Name: Mauri Pyle  MRN: [id number]     :   1980    Subjective:   General  Additional Pertinent Hx: 43 y/o F presents with low back pain. PMH includes recent hysterectomy, R ankle sx, migraines, hx [place] palsy. Referring Practitioner: Silvestre Felix MD  PT Visit Information  PT Insurance Information:  [employer] (No precert req'd)  Total # of Visits Approved: (8-12)  Total # of Visits to Date: 4  Plan of Care/Certification Expiration Date: 20  Progress Note Due Date: 20  Subjective  Subjective: Patient states she has been performing some of her exercises at home, had to take 800 mg ibuprofen last night. She attributes some of that to cold and rainy weather and she was on her feet for a long time and in wedge shoes.    General Comment  Comments: Goes by KRYSTLE"  Pain Screening  Patient Currently in Pain: Yes  Pain Assessment  Pain Assessment: 0-10  Pain Level: 1  Pain Type: Chronic pain  Vital Signs  Patient Currently in Pain: Yes       Treatment Activities:                                      Exercises  Exercise 1: Assess leg length and correct as needed   : LLE longer  Exercise 2: Isometric L hip extension from [facility] 5 x 5\"    Exercise 3: Isometric R hip flexion from 90/90 5 x 5\"    Exercise 4: TA contraction (alone x 10   5 sec hold,  UE, LE, UE/LE x 10 )    Exercise 5: Pelvic tilt x 10  Exercise 6: Hooklying lumbar rotation to R  x 10  5 sec hold  Exercise 7: Supine L quadratus stretch  x 5  5 sec hold  Exercise 8: Supine L piriformis stretch  x 5  5 sec hold  Exercise 9: Bilat SKTC  x 5  5 sec hold  Exercise 10: DKTC  x 5  5 sec hold  Exercise 11: Bridging x 10   Exercise 12: Single leg bridging L x 10  Exercise 13: Axial traction bilat LE  x 3  15 sec hold  Exercise 14: Sitting fwd reach to R  x 5  5 sec hold--sitting left hip retraction, 10 reps  Exercise 15: Repeated sit to stand with TA contraction  x Program, Manual Therapy - Soft Tissue Mobilization, Safety Education & Training  Timed Code Treatment Minutes: 41 Minutes     Therapy Time   Individual Concurrent Group Co-treatment   Time In 1501         Time Out 7909         Minutes 41         Timed Code Treatment Minutes: 41 Minutes     Electronically signed by Kimberly Ferreira PT on 2/7/2020 at 3:53 PM  Kimberly Ferreira, PT

## 2020-02-10 ENCOUNTER — HOSPITAL ENCOUNTER (OUTPATIENT)
Dept: PHYSICAL THERAPY | Age: 40
Setting detail: THERAPIES SERIES
Discharge: HOME | End: 2020-02-10
Payer: OTHER GOVERNMENT

## 2020-02-10 PROCEDURE — 97110 THERAPEUTIC EXERCISES: CPT

## 2020-02-10 NOTE — PROGRESS NOTES
Daily Treatment Note  Date: 2/10/2020  Patient Name: Tigre George  MRN: [id number]     :   1980    Subjective:   General  Additional Pertinent Hx: 43 y/o F presents with low back pain. PMH includes recent hysterectomy, R ankle sx, migraines, hx [place] palsy. Response To Previous Treatment: Patient with no complaints from previous session. Referring Practitioner: Vargas Pereira MD  PT Visit Information  PT Insurance Information:  [employer] (No precert req'd)  Total # of Visits Approved: (8-12)  Total # of Visits to Date: 5  Plan of Care/Certification Expiration Date: 20  Progress Note Due Date: 20  Subjective  Subjective: No pain. I put the heating pad on after therapy and that helps alot.   General Comment  Comments: Goes by \"Suze\"  Pain Screening  Patient Currently in Pain: Denies(post \"just a little tender\")  Vital Signs  Patient Currently in Pain: Denies(post \"just a little tender\")       Treatment Activities:      Exercises  Exercise 1: Assess leg length and correct as needed   02/10  equal  Exercise 2: Isometric L hip extension from [facility] 5 x 5\"  -- not needed  Exercise 3: Isometric R hip flexion from 90/90 5 x 5\"   -- not needed  Exercise 4: TA contraction (alone x 10   5 sec hold,  UE, LE, UE/LE x 10 )    Exercise 5: Pelvic tilt x 10  Exercise 6: Hooklying lumbar rotation to R  x 10  5 sec hold  Exercise 7: Supine L quadratus stretch  x 5  5 sec hold  Exercise 8: Supine L piriformis stretch  x 5  5 sec hold  Exercise 9: Bilat SKTC  x 5  5 sec hold  Exercise 10: DKTC  x 5  5 sec hold  Exercise 11: Bridging x 10   Exercise 12: Single leg bridging L x 10  Exercise 13: Axial traction bilat LE  x 3  15 sec hold  Exercise 14: Sitting fwd reach to R  x 5  5 sec hold--sitting left hip retraction, 10 reps  Exercise 15: Repeated sit to stand with TA contraction  x 10  Exercise 16: Multifidus row with green-t-band x 10 bilaterally  Exercise 17: Paloff press with green-t-band x 10  Exercise 18: Standing marches x 15  Exercise 19: Standing hip abd/ext x 15 bilaterally--standing glute sets 10 reps. Exercise 20: Standing lumbar rotation R with L glute squeeze x 10     ------------------- If high pain, can use U/S to L PSIS area         Assessment:   Conditions Requiring Skilled Therapeutic Intervention  Body structures, Functions, Activity limitations: Decreased high-level IADLs;Decreased posture;Decreased sensation;Decreased strength; Increased pain  Assessment: patient reports no pain to begin session and \"just a little tender\" post session. Leg length equal at beginning and end of session. She shows good understanding of correct technique with minimal verbal cues. Treatment Diagnosis: L sided sciatica  REQUIRES PT FOLLOW UP: Yes      Goals:  Short term goals  Time Frame for Short term goals: 3-4 weeks  Short term goal 1: Independent with HEP  Short term goal 2: Perform 10 Fair+ quality TA contractions  Short term goal 3: Improve L HS flexibility to at least 80 degrees  Short term goal 4: If needed, trial heel lift  Long term goals  Time Frame for Long term goals : 4-6 weeks  Long term goal 1: Report decreased frequency of LLE radicular symptoms. Long term goal 2: Report improved tolerance to sitting with equal WB. Long term goal 3: Improve Oswestry score to 18% impairment or less.   Patient Goals   Patient goals : reduce L low back pain    Plan:    Plan  Times per week: 2x  Plan weeks: 4-6 weeks  Current Treatment Recommendations: Strengthening, Manual Therapy - Joint Manipulation, Patient/Caregiver Education & Training, Equipment Evaluation, Education, & procurement, ROM, Modalities, Neuromuscular Re-education, Home Exercise Program, Manual Therapy - Soft Tissue Mobilization, Safety Education & Training  Timed Code Treatment Minutes: 30 Minutes     Therapy Time   Individual Concurrent Group Co-treatment   Time In 1547         Time Out 1617         Minutes 30         Timed Code Treatment Minutes: 27 Minutes     Yari Najera, GRETCHEN     Electronically signed by Yari Najera PTA on 2/10/2020 at 4:20 PM

## 2020-02-12 ENCOUNTER — HOSPITAL ENCOUNTER (OUTPATIENT)
Dept: PHYSICAL THERAPY | Age: 40
Setting detail: THERAPIES SERIES
Discharge: HOME | End: 2020-02-12
Payer: OTHER GOVERNMENT

## 2020-02-12 PROCEDURE — 97110 THERAPEUTIC EXERCISES: CPT

## 2020-02-12 ASSESSMENT — PAIN DESCRIPTION - ORIENTATION: ORIENTATION: LEFT;POSTERIOR

## 2020-02-12 ASSESSMENT — PAIN SCALES - GENERAL: PAINLEVEL_OUTOF10: 2

## 2020-02-12 ASSESSMENT — PAIN DESCRIPTION - LOCATION: LOCATION: KNEE

## 2020-02-12 ASSESSMENT — PAIN DESCRIPTION - PAIN TYPE: TYPE: ACUTE PAIN

## 2020-02-17 ENCOUNTER — HOSPITAL ENCOUNTER (OUTPATIENT)
Dept: PHYSICAL THERAPY | Age: 40
Setting detail: THERAPIES SERIES
End: 2020-02-17
Payer: OTHER GOVERNMENT

## 2020-02-19 ENCOUNTER — HOSPITAL ENCOUNTER (OUTPATIENT)
Dept: PHYSICAL THERAPY | Age: 40
Setting detail: THERAPIES SERIES
Discharge: HOME | End: 2020-02-19
Payer: OTHER GOVERNMENT

## 2020-02-19 PROCEDURE — 97110 THERAPEUTIC EXERCISES: CPT

## 2020-02-19 ASSESSMENT — PAIN SCALES - GENERAL: PAINLEVEL_OUTOF10: 6

## 2020-02-19 ASSESSMENT — PAIN DESCRIPTION - ORIENTATION: ORIENTATION: MID;UPPER

## 2020-02-19 ASSESSMENT — PAIN DESCRIPTION - PAIN TYPE: TYPE: ACUTE PAIN

## 2020-02-19 ASSESSMENT — PAIN DESCRIPTION - LOCATION: LOCATION: BACK

## 2020-02-19 NOTE — PROGRESS NOTES
Physical Therapy  Daily Treatment Note  Date: 2020  Patient Name: Champ Blanco  MRN: [id number]     :   1980    Subjective:   General  Additional Pertinent Hx: 43 y/o F presents with low back pain. PMH includes recent hysterectomy, R ankle sx, migraines, hx [place] palsy. Referring Practitioner: Reynaldo Doe MD  PT Insurance Information: Rena Bazan (No precert req'd)  Total # of Visits Approved: (8-12)  Total # of Visits to Date: 7  Plan of Care/Certification Expiration Date: 20  Progress Note Due Date: 20  Subjective: My back is killing me, i fell asleep in the recliner last night and i know that's what caused it.  It's in my upper to mid back not lower  Comments: Goes by KRYSTLE"  Patient Currently in Pain: Yes  Pain Assessment  Pain Level: 6  Pain Type: Acute pain  Pain Location: Back  Pain Orientation: Mid;Upper       Treatment Activities:         Exercises  Exercise 1: Assess leg length and correct as needed     RLE slightly longer  Exercise 2: Isometric L hip extension from [facility] 5 x 5\"  R on   Exercise 3: Isometric R hip flexion from 90/90 5 x 5\"   L on   Exercise 4: TA contraction (alone x 10   5 sec hold,  UE, LE, UE/LE x 10 )    Exercise 5: Pelvic tilt x 10  Exercise 6: Hooklying lumbar rotation to R  x 10  5 sec hold  Exercise 7: Supine L quadratus stretch  x 5  5 sec hold  Exercise 8: Supine L piriformis stretch  x 5  5 sec hold  with towel  Exercise 9: Bilat SKTC  x 5  5 sec hold  with towel  Exercise 10: DKTC  x 5  5 sec hold with towel  Exercise 11: Bridging x 10   Exercise 12: Single leg bridging L x 10  Exercise 13: Axial traction bilat LE  x 3  15 sec hold  Exercise 14: Sitting fwd reach to R  x 5  5 sec hold--sitting left hip retraction, 10 reps  Exercise 15: Repeated sit to stand with TA contraction  x 10  Exercise 16: Multifidus row with green-t-band x 10 bilaterally  Exercise 17: Paloff press with green-t-band x 10  Exercise 18: Standing marches x PM

## 2020-02-26 ENCOUNTER — APPOINTMENT (OUTPATIENT)
Dept: PHYSICAL THERAPY | Age: 40
End: 2020-02-26
Payer: OTHER GOVERNMENT

## 2020-02-27 ENCOUNTER — APPOINTMENT (OUTPATIENT)
Dept: PHYSICAL THERAPY | Age: 40
End: 2020-02-27
Payer: OTHER GOVERNMENT

## 2020-03-11 ENCOUNTER — OFFICE VISIT (OUTPATIENT)
Dept: PRIMARY CARE CLINIC | Age: 40
End: 2020-03-11
Payer: OTHER GOVERNMENT

## 2020-03-11 VITALS
WEIGHT: 147 LBS | DIASTOLIC BLOOD PRESSURE: 82 MMHG | SYSTOLIC BLOOD PRESSURE: 110 MMHG | BODY MASS INDEX: 28.86 KG/M2 | OXYGEN SATURATION: 98 % | HEART RATE: 81 BPM | TEMPERATURE: 98.5 F | RESPIRATION RATE: 20 BRPM | HEIGHT: 60 IN

## 2020-03-11 PROCEDURE — 99214 OFFICE O/P EST MOD 30 MIN: CPT | Performed by: FAMILY MEDICINE

## 2020-03-11 RX ORDER — LORAZEPAM 1 MG/1
1 TABLET ORAL DAILY PRN
Qty: 30 TABLET | Refills: 0 | Status: SHIPPED | OUTPATIENT
Start: 2020-03-11 | End: 2020-10-28 | Stop reason: SDUPTHER

## 2020-03-11 RX ORDER — CITALOPRAM 20 MG/1
20 TABLET ORAL DAILY
Qty: 90 TABLET | Refills: 3 | Status: SHIPPED | OUTPATIENT
Start: 2020-03-11 | End: 2020-10-28 | Stop reason: SDUPTHER

## 2020-03-11 RX ORDER — SPIRONOLACTONE 25 MG/1
25 TABLET ORAL DAILY
COMMUNITY

## 2020-03-13 ENCOUNTER — NURSE ONLY (OUTPATIENT)
Dept: PRIMARY CARE CLINIC | Age: 40
End: 2020-03-13
Payer: OTHER GOVERNMENT

## 2020-03-13 LAB
BILIRUBIN, POC: ABNORMAL
BLOOD URINE, POC: ABNORMAL
CLARITY, POC: CLEAR
COLOR, POC: YELLOW
GLUCOSE URINE, POC: ABNORMAL
KETONES, POC: ABNORMAL
LEUKOCYTE EST, POC: ABNORMAL
NITRITE, POC: ABNORMAL
PH, POC: 5
PROTEIN, POC: ABNORMAL
SPECIFIC GRAVITY, POC: 1030
UROBILINOGEN, POC: 0.2

## 2020-03-13 PROCEDURE — 81002 URINALYSIS NONAUTO W/O SCOPE: CPT | Performed by: FAMILY MEDICINE

## 2020-03-15 LAB — URINE CULTURE, ROUTINE: NORMAL

## 2020-03-16 NOTE — PROGRESS NOTES
[facility]  Discharge Summary        Date:3/16/2020    Patient Dru Tate    WHO:[id number]    UCD:6/93/4503    Diagnosis:Diagnosis: L sided sciatica           Total # of Visits to Date: 7     Subjective: My back is killing me, i fell asleep in the recliner last night and i know that's what caused it. It's in my upper to mid back not lower      Assessment: Patient continues to tolerate therapy well. able to perform exercises with little to no cues required. RLE was slightly longer than L today so synergistic techniques performed in reverse order. D/C ASSESSMENT: Pt seen for 7 visits, last on 2/19. Unable to assess goals, as RA was canceled. Will d/c from services at this time. GOALS:    Short term goals  Time Frame for Short term goals: 3-4 weeks  Short term goal 1: Independent with HEP  Short term goal 2: Perform 10 Fair+ quality TA contractions  Short term goal 3: Improve L HS flexibility to at least 80 degrees  Short term goal 4: If needed, trial heel lift      Long term goals  Time Frame for Long term goals : 4-6 weeks  Long term goal 1: Report decreased frequency of LLE radicular symptoms. Long term goal 2: Report improved tolerance to sitting with equal WB. Long term goal 3: Improve Oswestry score to 18% impairment or less.       PLAN:  D/c from services      Electronically signed by Joann Reyes, PT on 3/16/2020 at 11:10 AM

## 2020-03-19 ASSESSMENT — ENCOUNTER SYMPTOMS
ABDOMINAL PAIN: 0
WHEEZING: 0
COUGH: 0
NAUSEA: 0
BACK PAIN: 0
EYE DISCHARGE: 0
COLOR CHANGE: 0
DIARRHEA: 0
VOMITING: 0

## 2020-03-19 NOTE — PROGRESS NOTES
SUBJECTIVE:    Patient ID: Anastasia Arthur is a 44 y.o. female. HPI:   Patient is here today for 6-month follow-up. She is on Celexa and Ativan for anxiety. She states that these are working very well for her. She is tolerating them well. She is also on trazodone for insomnia. She states that this is also working well for her. She states she is having a lot of fatigue. She states that she just stays sleepy all the time. She states that it does not matter how long she sleeps that she is still very tired. She never feels well rested. She states that she does snore at night. She states that she does feel like she tosses and turns a lot at night. Past Medical History:   Diagnosis Date    Anxiety     Bell's palsy     Chronic migraine without aura, intractable, without status migrainosus 8/10/2017    Depression     Headache     Insomnia     Intractable migraine without aura and without status migrainosus 8/10/2017    Irritable bowel syndrome     Kidney stone 10/2017    Osteoarthritis     Periodic limb movement disorder (PLMD)     Snoring     PSG, 7/2016-mild      Current Outpatient Medications   Medication Sig Dispense Refill    spironolactone (ALDACTONE) 25 MG tablet Take 25 mg by mouth daily      citalopram (CELEXA) 20 MG tablet Take 1 tablet by mouth daily 90 tablet 3    LORazepam (ATIVAN) 1 MG tablet Take 1 tablet by mouth daily as needed for Anxiety for up to 30 days.  30 tablet 0    cyclobenzaprine (FLEXERIL) 5 MG tablet Take 5 mg by mouth 3 times daily as needed for Muscle spasms      traZODone (DESYREL) 100 MG tablet Take 100 mg by mouth nightly      ibuprofen (ADVIL;MOTRIN) 600 MG tablet Take 1 tablet by mouth 3 times daily as needed for Pain 90 tablet 0    rOPINIRole (REQUIP) 0.5 MG tablet TAKE 1 TABLET THREE TIMES A DAY 90 tablet 12    AIMOVIG 70 MG/ML SOAJ INJECT 70 MG (ONE AUTO INJECTOR) INTO THE SKIN EVERY 30 DAYS 1 mL 12    docusate sodium (COLACE) 100 MG capsule and regular rhythm. Pulses: Normal pulses. Heart sounds: Normal heart sounds. No murmur. Pulmonary:      Effort: Pulmonary effort is normal. No respiratory distress. Breath sounds: Normal breath sounds. Skin:     General: Skin is warm and dry. Neurological:      General: No focal deficit present. Mental Status: She is alert and oriented to person, place, and time. Mental status is at baseline. Psychiatric:         Mood and Affect: Mood normal.         Behavior: Behavior normal.         Thought Content: Thought content normal.         Judgment: Judgment normal.        /82 (Site: Left Upper Arm, Position: Sitting, Cuff Size: Medium Adult)   Pulse 81   Temp 98.5 °F (36.9 °C) (Temporal)   Resp 20   Ht 5' (1.524 m)   Wt 147 lb (66.7 kg)   LMP 10/19/2019   SpO2 98%   BMI 28.71 kg/m²      ASSESSMENT:    Azucena Blanchard was seen today for 6 month follow-up, fatigue and cough. Diagnoses and all orders for this visit:    Insomnia, unspecified type  -     [facility]    Daytime hypersomnolence  -     [address] [place] [address]    Anxiety  -     LORazepam (ATIVAN) 1 MG tablet; Take 1 tablet by mouth daily as needed for Anxiety for up to 30 days. Chronic migraine without aura, intractable, without status migrainosus    Other orders  -     citalopram (CELEXA) 20 MG tablet; Take 1 tablet by mouth daily        PLAN:    I put a referral in for the sleep lab for sleep study. I am refilling her Ativan today. Celexa was refilled as well. Follow-up with us in 6 months for routine checkup unless needed sooner. EMR Dragon/transcription disclaimer:  Much of this encounter note is electronic transcription/translation of spoken language toprinted texts. The electronic translation of spoken language may be erroneous, or at times, nonsensical words or phrases may be inadvertently transcribed.   Although I have reviewed the note for such

## 2020-04-02 ENCOUNTER — TELEMEDICINE (OUTPATIENT)
Dept: PRIMARY CARE CLINIC | Age: 40
End: 2020-04-02
Payer: OTHER GOVERNMENT

## 2020-04-02 PROCEDURE — 99213 OFFICE O/P EST LOW 20 MIN: CPT | Performed by: FAMILY MEDICINE

## 2020-04-08 ASSESSMENT — ENCOUNTER SYMPTOMS
ABDOMINAL PAIN: 0
EYE DISCHARGE: 0
BACK PAIN: 0
WHEEZING: 0
COUGH: 0
VOMITING: 0
DIARRHEA: 0
COLOR CHANGE: 0
NAUSEA: 0

## 2020-04-08 NOTE — PROGRESS NOTES
[address]  Phone:  [phone]      2020     TELEHEALTH EVALUATION -- Audio/Visual (During NXICI-17 public health emergency)    HPI:    Esdras Sosa (:  1980) has requested an audio/video evaluation for the following concern(s):    Patient presents today for a video visit for a follow up. She states that she is still staying tired. She states that she is not scheduled for her sleep study until . She states that she is still taking trazodone for her insomnia which seems to be working well. Review of Systems   Constitutional: Negative for activity change, appetite change and fever. HENT: Negative for congestion and nosebleeds. Eyes: Negative for discharge. Respiratory: Negative for cough and wheezing. Cardiovascular: Negative for chest pain and leg swelling. Gastrointestinal: Negative for abdominal pain, diarrhea, nausea and vomiting. Genitourinary: Negative for difficulty urinating, frequency and urgency. Musculoskeletal: Negative for back pain and gait problem. Skin: Negative for color change and rash. Neurological: Negative for dizziness and headaches. Hematological: Does not bruise/bleed easily. Psychiatric/Behavioral: Negative for sleep disturbance and suicidal ideas. Prior to Visit Medications    Medication Sig Taking? Authorizing Provider   spironolactone (ALDACTONE) 25 MG tablet Take 25 mg by mouth daily  Historical Provider, MD   citalopram (CELEXA) 20 MG tablet Take 1 tablet by mouth daily  Madelyn Zabala MD   LORazepam (ATIVAN) 1 MG tablet Take 1 tablet by mouth daily as needed for Anxiety for up to 30 days.   Jenny Lynn MD   cyclobenzaprine (FLEXERIL) 5 MG tablet Take 5 mg by mouth 3 times daily as needed for Muscle spasms  Historical Provider, MD   traZODone (DESYREL) 100 MG tablet Take 100 mg by mouth nightly  Historical Provider, MD   ibuprofen (ADVIL;MOTRIN) 600 MG tablet Take 1 tablet by X 2   ,   Social History     Tobacco Use    Smoking status: Never Smoker    Smokeless tobacco: Never Used   Substance Use Topics    Alcohol use: Yes     Comment: RARELY    Drug use: No   ,   Family History   Problem Relation Age of Onset    Osteoporosis Mother        PHYSICAL EXAMINATION:  Physical Exam  Constitutional:       General: She is not in acute distress. Appearance: Normal appearance. She is not ill-appearing. HENT:      Head: Normocephalic and atraumatic. Nose: Nose normal.   Eyes:      Extraocular Movements: Extraocular movements intact. Conjunctiva/sclera: Conjunctivae normal.   Neck:      Musculoskeletal: Normal range of motion. Skin:     Findings: No rash. Neurological:      General: No focal deficit present. Mental Status: She is alert and oriented to person, place, and time. Mental status is at baseline. Cranial Nerves: No cranial nerve deficit. Psychiatric:         Mood and Affect: Mood normal.         Behavior: Behavior normal.         Thought Content: Thought content normal.         Judgment: Judgment normal.         Due to this being a TeleHealth encounter, evaluation of the following organ systems is limited: Vitals/Constitutional/EENT/Resp/CV/GI//MS/Neuro/Skin/Heme-Lymph-Imm. ASSESSMENT/PLAN:  1. Snoring  Keep appt for sleep study. 2. Daytime hypersomnolence      3. Fatigue, unspecified type        Return if symptoms worsen or fail to improve. An  electronic signature was used to authenticate this note. --Vaughn Alejandra MD on 4/8/2020 at 12:41 PM      Pursuant to the emergency declaration under the [facility], [facility] waiver authority and the [employer] Safety and [employer] Act, this Virtual  Visit was conducted, with patient's consent, to reduce the patient's risk of exposure to COVID-19 and provide continuity of care for an established patient.     Services

## 2020-05-20 ENCOUNTER — HOSPITAL ENCOUNTER (OUTPATIENT)
Dept: SLEEP CENTER | Age: 40
Discharge: HOME | End: 2020-05-22
Payer: OTHER GOVERNMENT

## 2020-05-20 PROCEDURE — G0399 HOME SLEEP TEST/TYPE 3 PORTA: HCPCS

## 2020-05-20 PROCEDURE — 95806 SLEEP STUDY UNATT&RESP EFFT: CPT | Performed by: PSYCHIATRY & NEUROLOGY

## 2020-05-21 PROCEDURE — 95806 SLEEP STUDY UNATT&RESP EFFT: CPT | Performed by: PSYCHIATRY & NEUROLOGY

## 2020-05-21 PROCEDURE — G0399 HOME SLEEP TEST/TYPE 3 PORTA: HCPCS

## 2020-07-08 ENCOUNTER — OFFICE VISIT (OUTPATIENT)
Dept: PRIMARY CARE CLINIC | Age: 40
End: 2020-07-08
Payer: OTHER GOVERNMENT

## 2020-07-08 VITALS
SYSTOLIC BLOOD PRESSURE: 112 MMHG | RESPIRATION RATE: 16 BRPM | HEART RATE: 90 BPM | OXYGEN SATURATION: 98 % | HEIGHT: 60 IN | BODY MASS INDEX: 30.43 KG/M2 | TEMPERATURE: 97.9 F | DIASTOLIC BLOOD PRESSURE: 86 MMHG | WEIGHT: 155 LBS

## 2020-07-08 PROCEDURE — 99214 OFFICE O/P EST MOD 30 MIN: CPT | Performed by: FAMILY MEDICINE

## 2020-07-17 ENCOUNTER — HOSPITAL ENCOUNTER (OUTPATIENT)
Dept: WOMENS IMAGING | Age: 40
Discharge: HOME | End: 2020-07-17
Payer: OTHER GOVERNMENT

## 2020-07-17 PROCEDURE — 77063 BREAST TOMOSYNTHESIS BI: CPT

## 2020-07-20 ASSESSMENT — ENCOUNTER SYMPTOMS
VOMITING: 0
EYE DISCHARGE: 0
ABDOMINAL PAIN: 0
WHEEZING: 0
DIARRHEA: 0
NAUSEA: 0
COUGH: 0
BACK PAIN: 0
COLOR CHANGE: 0

## 2020-07-20 NOTE — PROGRESS NOTES
tablet TAKE 1 TABLET THREE TIMES A DAY 90 tablet 12    AIMOVIG 70 MG/ML SOAJ INJECT 70 MG (ONE AUTO INJECTOR) INTO THE SKIN EVERY 30 DAYS 1 mL 12    valACYclovir (VALTREX) 1 g tablet Take 2,000 mg by mouth 2 times daily as needed (PRN) Indications: COLD SORES       omeprazole (PRILOSEC) 40 MG delayed release capsule Take 40 mg by mouth daily       glycopyrrolate (ROBINUL) 2 MG tablet Take 2 mg by mouth 2 times daily       No current facility-administered medications for this visit. Allergies   Allergen Reactions    Doxycycline     Minocycline     Pcn [Penicillins] Palpitations       Review of Systems   Constitutional: Positive for fatigue. Negative for activity change, appetite change and fever. HENT: Negative for congestion and nosebleeds. Eyes: Negative for discharge. Respiratory: Negative for cough and wheezing. Cardiovascular: Negative for chest pain and leg swelling. Gastrointestinal: Negative for abdominal pain, diarrhea, nausea and vomiting. Genitourinary: Negative for difficulty urinating, frequency and urgency. Musculoskeletal: Negative for back pain and gait problem. Skin: Negative for color change and rash. Neurological: Negative for dizziness and headaches. Hematological: Does not bruise/bleed easily. Psychiatric/Behavioral: Negative for sleep disturbance and suicidal ideas. OBJECTIVE:     Physical Exam  Vitals signs reviewed. Constitutional:       General: She is not in acute distress. Appearance: Normal appearance. She is well-developed. She is not diaphoretic. HENT:      Head: Normocephalic and atraumatic. Right Ear: External ear normal.      Left Ear: External ear normal.   Neck:      Musculoskeletal: Normal range of motion and neck supple. Cardiovascular:      Rate and Rhythm: Normal rate and regular rhythm. Pulses: Normal pulses. Heart sounds: Normal heart sounds. No murmur.    Pulmonary:      Effort: Pulmonary effort is normal. No respiratory distress. Breath sounds: Normal breath sounds. Skin:     General: Skin is warm and dry. Neurological:      General: No focal deficit present. Mental Status: She is alert and oriented to person, place, and time. Mental status is at baseline. Psychiatric:         Mood and Affect: Mood normal.         Behavior: Behavior normal.         Thought Content: Thought content normal.         Judgment: Judgment normal.        /86 (Site: Left Upper Arm, Position: Sitting, Cuff Size: Medium Adult)   Pulse 90   Temp 97.9 °F (36.6 °C) (Temporal)   Resp 16   Ht 5' (1.524 m)   Wt 155 lb (70.3 kg)   LMP 10/19/2019   SpO2 98%   BMI 30.27 kg/m²      ASSESSMENT:    Rojas Altamirano was seen today for 3 month follow-up. Diagnoses and all orders for this visit:    Anxiety    Screening mammogram, encounter for  -     JESSI DIGITAL SCREEN W OR WO CAD BILATERAL; Future    Fatigue, unspecified type    Chronic migraine without aura, intractable, without status migrainosus        PLAN:    Continue current medications. Follow-up with us in 6 months for checkup unless needed sooner. EMR Dragon/transcription disclaimer:  Much of this encounter note is electronic transcription/translation of spoken language toprinted texts. The electronic translation of spoken language may be erroneous, or at times, nonsensical words or phrases may be inadvertently transcribed.   Although I have reviewed the note for such errors, some may stillexist.

## 2020-07-23 RX ORDER — TRAZODONE HYDROCHLORIDE 100 MG/1
TABLET ORAL
Qty: 90 TABLET | Refills: 3 | Status: SHIPPED | OUTPATIENT
Start: 2020-07-23 | End: 2020-11-25

## 2020-08-07 ENCOUNTER — OFFICE VISIT (OUTPATIENT)
Dept: NEUROLOGY | Age: 40
End: 2020-08-07
Payer: OTHER GOVERNMENT

## 2020-08-07 VITALS
RESPIRATION RATE: 14 BRPM | WEIGHT: 155 LBS | HEIGHT: 60 IN | BODY MASS INDEX: 30.43 KG/M2 | DIASTOLIC BLOOD PRESSURE: 89 MMHG | HEART RATE: 80 BPM | SYSTOLIC BLOOD PRESSURE: 131 MMHG

## 2020-08-07 PROCEDURE — 99213 OFFICE O/P EST LOW 20 MIN: CPT | Performed by: PHYSICIAN ASSISTANT

## 2020-08-07 RX ORDER — PRUCALOPRIDE 2 MG/1
2 TABLET, FILM COATED ORAL DAILY
COMMUNITY

## 2020-08-07 NOTE — PATIENT INSTRUCTIONS
Patient education: Migraine headaches in adults       MIGRAINE HEADACHE OVERVIEW -- Headaches can be quite debilitating, although the vast majority are not due to life-threatening disorders. Approximately 90 percent of headaches are caused by one of three syndromes:   ?Migraine headache  ? Tension-type headaches  ? Cluster headaches  This article discusses migraine headaches in adults. MIGRAINE HEADACHE SYMPTOMS -- Between 12 and 16 percent of people in the [place] experience migraine headaches, making it the second most common type of headache. Pain -- The pain of a migraine headache usually begins gradually, intensifies over minutes to one or more hours, and resolves gradually at the end of the attack. The headache is typically dull, deep, and steady when mild to moderate in severity; it becomes throbbing or pulsatile when severe. Migraine headaches are worsened by light, sneezing, straining, constant motion, moving the head rapidly, or physical activity. Many migraine sufferers try to get relief by lying down in a darkened, quiet room. In 60 to 70 percent of people, the pain occurs on only one side of the head. In adults, a migraine headache usually lasts a few hours, although it can last from four to 72 hours. Other symptoms -- Migraine headaches are often accompanied by nausea and vomiting, as well as sensitivity to light and noise. Between 10 and 20 percent of people with migraines also experience nasal stuffiness and runny nose, or teary eyes. The symptoms of a migraine attack may be severe and alarming, but in most cases there are no lasting health effects when the attack ends. Aura -- About 20 percent of people with migraines experience symptoms before the headache; this is called an aura. The aura may include flashing lights or bright spots, zigzag lines, changes in vision, or numbness or tingling in the fingers of one hand, lips, tongue, or lower face.  You may have one or more of these aura symptoms. Auras may also involve other senses and can occasionally cause temporary muscle weakness or changes in speech; these symptoms can be frightening. Aura symptoms typically last five to 20 minutes and rarely last more than 60 minutes. The headache occurs soon after the aura stops. Muscle-related auras may last longer. MIGRAINE HEADACHE TRIGGERS -- Migraines can be triggered by stress, worry, menstrual periods, birth control pills, physical exertion, fatigue, lack of sleep, hunger, head trauma, and certain foods or drinks that contain chemicals such as nitrites, glutamate, aspartate, tyramine. Certain medications and chemicals can also trigger a migraine, including nitroglycerin (used to treat chest pain), estrogens, hydralazine (used to treat high blood pressure), perfumes, smoke, and organic solvents with a strong odor. Headache diary -- People who have frequent or severe headaches may benefit from keeping a headache diary over the course of one month. This can be used to determine what triggers the migraines and what makes them better. MIGRAINE HEADACHE TREATMENT TYPES -- Migraine headache treatment depends upon the frequency, severity, and symptoms of your headache. ? Acute treatment refers to medicines you can take when you have a headache to relieve the pain immediately. ?Preventive treatment refers to medicines you can take on a regular (usually daily) basis to prevent headaches in the future. Acute treatment -- The pain of migraines can be tough to get rid of. Treatment is most likely to work if you take it at the first sign of an attack (eg, at the first sign of aura if one occurs, or when pain begins). In some people, an aura occurs before the migraine (see 'Aura' above). Therefore, an aura can serve as a reliable warning that a migraine headache is on the way, and should be the signal to take migraine medication. (See \"Acute treatment of migraine in adults\". )  Pain relievers -- Mild migraine attacks may respond to pain relievers, some of which are available without a prescription. These drugs include:  ? Aspirin  ? Acetaminophen  ? Nonsteroidal anti-inflammatory drugs (NSAIDs) such as ibuprofen, indomethacin, or naproxen  ? Indomethacin is a prescription medicine that comes in a rectal suppository, which may be useful for people who have nausea during their headaches. Pain relievers are also available in combination with caffeine, which enhances their antimigraine effect. As an example, some pain relievers contain a combination of acetaminophen, aspirin, and caffeine. Pain relievers are often recommended first for mild to moderate migraine attacks. However, they should not be used too often because overuse can lead to medication-overuse headaches or chronic daily headaches. If you respond to a pain reliever, continue taking it with each attack, as long as you do not take it more than once or twice per week. People with gastritis (inflammation of the stomach), ulcers, kidney disease, and bleeding conditions should not take products containing aspirin or NSAIDs. Anti-nausea medications -- If you have nausea and vomiting with a migraine, you can take an anti-nausea medicine by injection or rectal suppository. In some cases, antimigraine drugs can be taken in combination with drugs that alleviate nausea and vomiting, such as metoclopramide or prochlorperazine. Anti-nausea medications given by mouth are usually used in combination with other medications to treat acute migraine. However, anti-nausea medications can be given alone in the hospital by intravenous and intramuscular administration to treat acute migraine headache. Triptans -- If a pain reliever does not control your migraine pain, most healthcare providers will recommend a treatment that is migraine-specific. This includes a class of medications called triptans.  Examples of triptans are sumatriptan, zolmitriptan, naratriptan, rizatriptan, almotriptan, eletriptan, and frovatriptan. Triptans can be used at home or work/school, and are all available in an oral (pill) form. Sumatriptan and zolmitriptan are available as nasal sprays, and sumatriptan is available as an injection. People with familial hemiplegic migraine, basilar migraine, uncontrolled high blood pressure, vascular disease (including ischemic stroke and coronary artery disease), Prinzmetal's angina, pregnancy, and severe kidney or liver disease should not take triptans in most cases. ?Sumatriptan -- Sumatriptan is available in many different formulas, including a tablet, nasal spray, and injection. Over 70 percent of people get pain relief within one hour of injecting sumatriptan; by two hours, 90 percent of people notice improvement. Your healthcare provider can help to decide which formula (pill, nasal spray, or shot) is best for you. Common side effects of injectable sumatriptan include pain at the injection site, dizziness, a feeling of warmth, and tingling in the arms or legs. Most of these reactions occur soon after the injection and resolve within 30 minutes. These drugs are safe for most patients. Sumatriptan nasal spray begins to work faster than the pill form and has fewer side effects than the injection. The most common side effect of the nasal spray is an unpleasant taste. A tablet that contains a combination of sumatriptan-naproxen appears to be more effective than each medication taken alone. It is not known if taking the two medications separately would be as effective as the combination tablet. Ergots -- Ergotamine is an older, migraine-specific drug. It is often combined with caffeine. Ergots are not usually as effective as triptans and are more likely to cause side effects. Ergots are sometimes recommended for people with migraines of a long duration (greater than 48 hours) or that frequently recur.  People with high blood pressure, coronary artery disease, or kidney or liver disease should not use ergotamines. Dihydroergotamine is related to ergotamine, and can be taken by nasal spray for mild or moderate migraine attacks. It can also be given by injection for severe attacks. Other medications -- Other medications for migraine are not as well studied or are less effective. A small percentage of people with migraine headaches do not respond to routine acute treatments and may require additional treatment for pain. Dexamethasone is a glucocorticoid (steroid) medication that can be given by injection, along with another acute migraine treatment, to reduce the risk of a migraine coming back. Dexamethasone injections may be given in an emergency department or clinic. Ulbrelvy-CGRP inhibitor It is the first in a new class of medications known as oral CGRP (calcitonin gene related peptide). It is non-narcotic, oral treatment specifically designed to help relieve migraine pain plus its most bothersome symptoms (light sensitivity, sound sensitivity, and nausea). Nurtec-CGRP inhibitor      Preventive treatment -- Preventive treatment effectively controls migraine headaches in most people, although the benefits of this treatment may not be evident for three to four weeks. In some cases, both acute treatment and preventive treatment are necessary to adequately control migraines. Beta blockers -- Beta blockers were originally developed to treat high blood pressure. In addition, beta blockers reduce the frequency of migraine attacks in 60 to 80 percent of people. Commonly used beta blockers include propranolol, nadolol, atenolol, and metoprolol. Beta blockers may cause depression in some people or impotence in some men. Antidepressant medications -- Tricyclic antidepressants (TCAs) and certain other antidepressant medications are often recommended for migraine prevention. These include amitriptyline, nortriptyline, and doxepin.  Of these, amitriptyline has proven attacks. Avoiding medication overuse -- It is essential to use antimigraine medications according to the prescription and clinician's instructions. Overuse of certain medications for migraine, including over-the-counter drugs such as acetaminophen and nonsteroidal antiinflammatory drugs, or prescription drugs such as triptans, can lead to medication-overuse headaches (also called rebound headaches) and to a pattern of daily headaches that require increasing quantities of drugs for relief. A vicious cycle occurs when frequent headaches cause you to take medications, which then cause rebound headaches as the medications wear off, causing you to take more medication, and so on. Speak with a healthcare provider if your migraine treatment does not relieve your headaches or if you are having unpleasant medication side effects. Switching to another drug or switching from acute treatment to preventive treatment may be helpful. MENSTRUAL MIGRAINES -- Migraines occur about three times more commonly in women than in men. Estrogen has a variable effect on the frequency and severity of a woman's migraines; some women who take birth control pills (which contain estrogen) or hormone replacement therapy experience worsening headaches, while others improve. Similarly, some women have more frequent or severe headaches during pregnancy while others have improvement. Menstrual migraines are migraine headaches that occur around the beginning of a woman's menstrual period (usually two days before to three days after the period begins). Women with menstrual migraine may also have migraines at other times during the month. Most often, there is no migraine aura associated with menstrual migraines, even if the woman usually has aura at other times. Menstrual migraines are thought to be triggered by the normal decrease in estrogen levels that occurs before the menstrual period begins.  Menstrual migraines tend to be longer lasting, more severe, and more resistant to treatment than other types of migraine. Treatment -- Initial treatment of acute menstrual migraine is the same as treatment for migraine occurring at any other time. Preventive therapies for menstrual migraine can be either nonspecific (those that do not address the hormonal trigger) or specific (hormone-based treatments). With nonspecific strategies, success requires accurate anticipation of menses for scheduling interventions; therefore, women with irregular cycles are not good candidates for these options. Coexisting problems, such as dysmenorrhea, menorrhagia, endometriosis, as well as contraception needs may influence choice of preventive therapy. A preventive treatment may be useful for women who have menstrual migraines on a predictable schedule. This treatment strategy is called \"mini-prophylaxis\". ?Nonsteroidal antiinflammatory drugs (NSAIDs) such as ibuprofen or naproxen are one option for mini-prophylaxis of menstrual migraine. ?Triptans such as frovatriptan, sumatriptan, or naratriptan are another option. Typically, long-acting triptans are dosed twice daily beginning two days before anticipated menses and continued for five days. Hormonal treatments may be recommended to prevent menstrual migraines. One approach is to use estrogen-progestin contraceptive pills in an extended cycle; another choice is menstrually-targeted supplemental estrogen. These treatments work by preventing a rapid decline in the level of estrogen in the body before the menstrual period, which is believed to trigger the migraine. However, some experts avoid treatment with estrogen-progestin contraceptives for women who have a menstrual migraine with aura. Others consider the use of such treatment only for healthy women younger than age 28 who do not have focal neurologic signs and who do not smoke.   WHERE TO GET MORE INFORMATION -- Your healthcare provider is the best source of information for questions and concerns related to your medical problem    Organizations  National [facility] organization dedicated to service headache sufferers, their families, and the healthcare practitioners who treat them. Promotes research into headache causes and treatments and educates the public. [address] [place] [address], Amina Chaudhry [address]63   [email or web address]   [employer] Criss [employer]   Tel: [phone] ([phone])   Fax: [phone]   Migraine [address] [place] [address] Drive  Assists migraine sufferers by providing information and support and by raising money to fund innovative research into its causes and better treatments. [address] [facility] [email or web address] [email or web address]   [email or web address]   Tel: [phone]   Fax: [phone] Headache Society Committee for Headache Education (ACHE)  The American Headache Society Committee on Headache Education (ACHE) is a nonprofit patient-health professional partnership dedicated to advancing the treatment and management of patients with headache. [address] [facility]. [address]   [email or web address]   [email or web address]   Tel: [phone]

## 2020-08-07 NOTE — PROGRESS NOTES
[facility] Neurology Follow Up Encounter      Information:   Patient Name: Jen Bravo  :   1980  Age:   36 y.o. MRN:   [id number]  Account #:  [de-identified]  Date:              20    Provider:  Jay Jay Choi PA-C    Chief Complaint   Patient presents with    6 Month Follow-Up    Migraine       Subjective:   Jen Bravo is a 36 y.o. female  with a history of migraines, snoring, and PLMD who comes in for a follow up. At her last office visit, she was to continue [address] [place] [address]. She has a prescription for Relpax, as well. She has not had a migraine in several months. She hasn't used Aimovig in 2 months. The PLMD is stable. Migraine: Hx 2020  Pain location:  frontal  Onset:  Early 's  Character:  Throbbing  Timing:  Several months ago  Progression: Improved  Similar to prior headaches:  Yes  Associated:   Nausea, vomiting, photophobia, and phonophobia  Aggravated:  Menses, allergies, or heat   Treatments tried:  Sleep and a dark room; Fioricet; cold gel mask; Excedrin, Topamax, Maxalt, Relpax, Periactin; hot shower; She has stopped using Aimovig. 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  Alleviated: Usually Relpax but she sometimes has to take 2;  Aimovig       Objective:     Past Medical History:   Diagnosis Date    Anxiety     Bell's palsy     Chronic migraine without aura, intractable, without status migrainosus 8/10/2017    Depression     Headache     Insomnia     Intractable migraine without aura and without status migrainosus 8/10/2017    Irritable bowel syndrome     Kidney stone 10/2017    Osteoarthritis     Periodic limb movement disorder (PLMD)     Snoring     PSG, 2016-mild       Past Surgical History:   Procedure Laterality Date    ANKLE SURGERY Right     CYSTOURETHROSCOPY/STENT REMOVAL      HYSTERECTOMY N/A 2019    TOTAL LAPAROSCOPIC HYSTERECTOMY WITH DAVINCI CYSTOSCOPY performed by Juwan Hutson MD at [address]      X 2       Recent Hospitalizations  ·     Significant Injuries  ·     Family History   Problem Relation Age of Onset    Osteoporosis Mother        Social History     Socioeconomic History    Marital status:      Spouse name: Not on file    Number of children: Not on file    Years of education: Not on file    Highest education level: Not on file   Occupational History    Not on file   Social Needs    Financial resource strain: Not on file    Food insecurity     Worry: Not on file     Inability: Not on file    Transportation needs     Medical: Not on file     Non-medical: Not on file   Tobacco Use    Smoking status: Never Smoker    Smokeless tobacco: Never Used   Substance and Sexual Activity    Alcohol use: Yes     Comment: RARELY    Drug use: No    Sexual activity: Yes     Partners: Male   Lifestyle    Physical activity     Days per week: Not on file     Minutes per session: Not on file    Stress: Not on file   Relationships    Social connections     Talks on phone: Not on file     Gets together: Not on file     Attends Faith service: Not on file     Active member of club or organization: Not on file     Attends meetings of clubs or organizations: Not on file     Relationship status: Not on file    Intimate partner violence     Fear of current or ex partner: Not on file     Emotionally abused: Not on file     Physically abused: Not on file     Forced sexual activity: Not on file   Other Topics Concern    Not on file   Social History Narrative    Not on file       Medications:  Current Outpatient Medications   Medication Sig Dispense Refill    Prucalopride Succinate (MOTEGRITY) 2 MG TABS Take by mouth      traZODone (DESYREL) 100 MG tablet TAKE 1 TABLET NIGHTLY 90 tablet 3    spironolactone (ALDACTONE) 25 MG tablet Take 25 mg by mouth daily      citalopram (CELEXA) 20 MG tablet Take 1 tablet by mouth daily 90 tablet 3    cyclobenzaprine (FLEXERIL) 5 MG tablet Take 5 mg by mouth 3 times daily as needed for Muscle spasms      ibuprofen (ADVIL;MOTRIN) 600 MG tablet Take 1 tablet by mouth 3 times daily as needed for Pain 90 tablet 0    rOPINIRole (REQUIP) 0.5 MG tablet TAKE 1 TABLET THREE TIMES A DAY 90 tablet 12    AIMOVIG 70 MG/ML SOAJ INJECT 70 MG (ONE AUTO INJECTOR) INTO THE SKIN EVERY 30 DAYS 1 mL 12    valACYclovir (VALTREX) 1 g tablet Take 2,000 mg by mouth 2 times daily as needed (PRN) Indications: COLD SORES       omeprazole (PRILOSEC) 40 MG delayed release capsule Take 40 mg by mouth daily       glycopyrrolate (ROBINUL) 2 MG tablet Take 2 mg by mouth 2 times daily       No current facility-administered medications for this visit. Allergies: Allergies   Allergen Reactions    Doxycycline     Minocycline     Pcn [Penicillins] Palpitations     REVIEW OF SYSTEMS     Constitutional: []? Fever []? Sweats []? Chills []? Recent Injury   [x]? Denies all unless marked  HENT:[x]? Headache  []? Head Injury  []? Sore Throat  []? Ear Pain  []? Dizziness []? Hearing Loss   []? Denies all unless marked  Spine:  []? Neck pain  []? Back pain  []? Sciaticia  [x]? Denies all unless marked  Cardiovascular:[]? Chest Pain []? Palpitations []? Heart Disease  [x]? Denies all unless marked  Pulmonary: []? Shortness of Breath []? Cough   [x]? Denies all unless marked  Gastrointestinal:  []? Abdominal Pain  []? Blood in Stool  []? Diarrhea []? Constipation []? Nausea  []? Vomiting  [x]? Denies all unless marked  Genitourinary:  []? Dysuria []? Frequency  []? Incontinence []? Urgency   [x]? Denies all unless marked  Musculoskeletal: []? Arthralgia  []? Myalgias []? Muscle cramps  []? Muscle twitches   [x]? Denies all unless marked   Extremities:   []? Pain   []? Swelling   [x]? Denies all unless marked  Skin:[]? Rash  []? Color Change  [x]? Denies all unless marked  Neurological:[]? Visual Disturbance []?  Double Vision []? Slurred Speech []? Trouble swallowing  []? Vertigo []? Tingling []? Numbness []? Weakness []? Loss of Balance   []? Loss of Consciousness []? Memory Loss []? Seizures  [x]? Denies all unless marked  Psychiatric/Behavioral:[]? Depression []? Anxiety  [x]? Denies all unless marked  Sleep: []? Insomnia []? Sleep Disturbance []? Snoring []? Restless Legs []? Daytime Sleepiness []? Sleep Apnea  [x]? Denies all unless marked    The MA has completed the ROS with the patient. I have reviewed it in its' entirety with the patient and agree with the documentation. PHYSICAL EXAM  /89   Pulse 80   Resp 14   Ht 5' (1.524 m)   Wt 155 lb (70.3 kg)   LMP 10/19/2019   BMI 30.27 kg/m²      Constitutional - No acute distress    HEENT- Conjunctiva normal.  No scars, masses, or lesions over external nose or ears, no neck masses noted, no jugular vein distension, no bruit  Cardiac- Regular rate and rhythm  Pulmonary- Clear to auscultation, good expansion, normal effort without use of accessory muscles  Musculoskeletal - No significant wasting of muscles noted, no bony deformities  Extremities - No clubbing, cyanosis or edema  Skin - Warm, dry, and intact. No rash, erythema, or pallor  Psychiatric - Mood, affect, and behavior appear normal      Neurologic:  Extraocular movements are intact without nystagmus. Visual fields are full to confrontation. Facial movements are symmetrical and normal.  Speech is precise. Extremity strength is normal in both uppers and lowers. Deep tendon reflexes are intact and symmetrical.  Rapid alternating movements are unimpaired. Finger-to-nose testing is performed well, without dysmetria.   Gait is normal.      I reviewed the following studies:      [  ]  :  Clinical laboratory test results    [  ]  :  Radiology reports    [X]  :  Review and summarization of medical records and/or obtain medical records     [  ]  :  Previous/recent polysomnogram report(s)    [  ]  :  [place] Sleepiness Scale      Assessment:       ICD-10-CM    1. Chronic migraine without aura, intractable, without status migrainosus  G43.719              [  ]  :  Stable        [  ]  :  Improved                          [X]  :  Well controlled                 [  ]  :  Resolving        [  ]  :  Resolved        [  ]  :  Inadequately controlled        [  ]  :  Worsening        [  ]  :  Additional workup planned      Plan:   No orders of the defined types were placed in this encounter. No orders of the defined types were placed in this encounter. 1.  The following educational material has been included in this visit after visit summary for your review: migraines-  Discussed with the patient and all questions fully answered. 2.  We had a discussion about the clinical issues and went over the various important aspects to consider. Treatment plan discussed. Discussed use, benefit, and SE of prescribed medication. All questions were answered. Pt voiced understanding and agrees with treatment plan. 3.  The current medical regimen is effective;  continue present plan and medications. 4.  Follow up in 6 months        Note:  A total of >50% (>8 minutes) of 15 minutes was spent discussing the pathophysiology and treatment and/or coordination of care of the above diagnoses.

## 2020-10-28 ENCOUNTER — OFFICE VISIT (OUTPATIENT)
Dept: PRIMARY CARE CLINIC | Age: 40
End: 2020-10-28
Payer: OTHER GOVERNMENT

## 2020-10-28 VITALS
BODY MASS INDEX: 30.9 KG/M2 | WEIGHT: 157.4 LBS | RESPIRATION RATE: 18 BRPM | HEIGHT: 60 IN | SYSTOLIC BLOOD PRESSURE: 140 MMHG | OXYGEN SATURATION: 97 % | TEMPERATURE: 97.5 F | HEART RATE: 91 BPM | DIASTOLIC BLOOD PRESSURE: 98 MMHG

## 2020-10-28 PROCEDURE — 99213 OFFICE O/P EST LOW 20 MIN: CPT | Performed by: FAMILY MEDICINE

## 2020-10-28 RX ORDER — CITALOPRAM 40 MG/1
40 TABLET ORAL DAILY
Qty: 30 TABLET | Refills: 0 | Status: SHIPPED | OUTPATIENT
Start: 2020-10-28 | End: 2020-11-25 | Stop reason: SDUPTHER

## 2020-10-28 RX ORDER — CITALOPRAM 40 MG/1
40 TABLET ORAL DAILY
Qty: 90 TABLET | Refills: 3 | Status: SHIPPED | OUTPATIENT
Start: 2020-10-28 | End: 2020-10-28 | Stop reason: SDUPTHER

## 2020-10-28 RX ORDER — LORAZEPAM 1 MG/1
1 TABLET ORAL 2 TIMES DAILY PRN
Qty: 45 TABLET | Refills: 2 | Status: SHIPPED | OUTPATIENT
Start: 2020-10-28 | End: 2020-11-27

## 2020-10-28 ASSESSMENT — ENCOUNTER SYMPTOMS
COUGH: 0
COLOR CHANGE: 0
ABDOMINAL PAIN: 0
DIARRHEA: 0
BACK PAIN: 0
EYE DISCHARGE: 0
VOMITING: 0
NAUSEA: 0
WHEEZING: 0

## 2020-10-28 NOTE — PROGRESS NOTES
THREE TIMES A DAY 90 tablet 12    AIMOVIG 70 MG/ML SOAJ INJECT 70 MG (ONE AUTO INJECTOR) INTO THE SKIN EVERY 30 DAYS 1 mL 12    valACYclovir (VALTREX) 1 g tablet Take 2,000 mg by mouth 2 times daily as needed (PRN) Indications: COLD SORES       omeprazole (PRILOSEC) 40 MG delayed release capsule Take 40 mg by mouth daily       glycopyrrolate (ROBINUL) 2 MG tablet Take 2 mg by mouth 2 times daily       No current facility-administered medications for this visit. Allergies   Allergen Reactions    Doxycycline     Minocycline     Pcn [Penicillins] Palpitations       Review of Systems   Constitutional: Negative for activity change, appetite change and fever. HENT: Negative for congestion and nosebleeds. Eyes: Negative for discharge. Respiratory: Negative for cough and wheezing. Cardiovascular: Negative for chest pain and leg swelling. Gastrointestinal: Negative for abdominal pain, diarrhea, nausea and vomiting. Genitourinary: Negative for difficulty urinating, frequency and urgency. Musculoskeletal: Negative for back pain and gait problem. Skin: Negative for color change and rash. Neurological: Positive for headaches. Negative for dizziness. Hematological: Does not bruise/bleed easily. Psychiatric/Behavioral: Positive for sleep disturbance. Negative for suicidal ideas. The patient is nervous/anxious. OBJECTIVE:     Physical Exam  Vitals signs reviewed. Constitutional:       General: She is not in acute distress. Appearance: Normal appearance. She is well-developed. She is not diaphoretic. HENT:      Head: Normocephalic and atraumatic. Right Ear: External ear normal.      Left Ear: External ear normal.   Neck:      Musculoskeletal: Normal range of motion and neck supple. Cardiovascular:      Rate and Rhythm: Normal rate and regular rhythm. Pulses: Normal pulses. Heart sounds: Normal heart sounds. No murmur.    Pulmonary:      Effort: Pulmonary effort is normal. No respiratory distress. Breath sounds: Normal breath sounds. Skin:     General: Skin is warm and dry. Neurological:      General: No focal deficit present. Mental Status: She is alert and oriented to person, place, and time. Mental status is at baseline. Psychiatric:         Mood and Affect: Mood normal.         Behavior: Behavior normal.         Thought Content: Thought content normal.         Judgment: Judgment normal.        BP (!) 140/98   Pulse 91   Temp 97.5 °F (36.4 °C)   Resp 18   Ht 5' (1.524 m)   Wt 157 lb 6.4 oz (71.4 kg)   LMP 10/19/2019   SpO2 97%   BMI 30.74 kg/m²      ASSESSMENT:    Iva Mckeon was seen today for anxiety. Diagnoses and all orders for this visit:    Anxiety  -     LORazepam (ATIVAN) 1 MG tablet; Take 1 tablet by mouth 2 times daily as needed for Anxiety for up to 30 days. Other orders  -     Discontinue: citalopram (CELEXA) 40 MG tablet; Take 1 tablet by mouth daily  -     citalopram (CELEXA) 40 MG tablet; Take 1 tablet by mouth daily        PLAN:    Edgar Albrightsamir was requested today. I refilled her Ativan for her. Follow-up with us in 4 weeks for virtual visit for recheck to see how she is doing with anxiety. Increase citalopram to 40 mg daily. EMR Dragon/transcription disclaimer:  Much of this encounter note is electronic transcription/translation of spoken language toprinted texts. The electronic translation of spoken language may be erroneous, or at times, nonsensical words or phrases may be inadvertently transcribed.   Although I have reviewed the note for such errors, some may stillexist.

## 2020-11-25 ENCOUNTER — VIRTUAL VISIT (OUTPATIENT)
Dept: PRIMARY CARE CLINIC | Age: 40
End: 2020-11-25
Payer: OTHER GOVERNMENT

## 2020-11-25 PROCEDURE — 99213 OFFICE O/P EST LOW 20 MIN: CPT | Performed by: FAMILY MEDICINE

## 2020-11-25 RX ORDER — CITALOPRAM 40 MG/1
40 TABLET ORAL DAILY
Qty: 90 TABLET | Refills: 3 | Status: SHIPPED | OUTPATIENT
Start: 2020-11-25 | End: 2022-02-15

## 2020-11-25 RX ORDER — TRAZODONE HYDROCHLORIDE 100 MG/1
50 TABLET ORAL NIGHTLY
Qty: 90 TABLET | Refills: 3
Start: 2020-11-25 | End: 2022-01-03

## 2020-11-25 ASSESSMENT — ENCOUNTER SYMPTOMS
NAUSEA: 0
DIARRHEA: 0
BACK PAIN: 0
EYE DISCHARGE: 0
WHEEZING: 0
COLOR CHANGE: 0
COUGH: 0
VOMITING: 0
ABDOMINAL PAIN: 0

## 2020-11-25 NOTE — PROGRESS NOTES
[address]  Phone:  [phone]      2020     TELEHEALTH EVALUATION -- Audio/Visual (During PPFPA-11 public health emergency)    HPI:    Rafael Lao (:  1980) has requested an audio/video evaluation for the following concern(s):    Patient is seen today for 1 month follow-up via video visit on her anxiety. She states that she feels like going up on Celexa has helped significantly. She is still taking her Ativan as needed for the anxiety and panic attacks but she states that they are much better. She states that she is not having any side effects to it. She states that she would like to continue her current dose. She states that she is taking half of the trazodone and melatonin. She states that these are working well for her. She would like to continue the current dose of this as well. Review of Systems   Constitutional: Positive for fatigue. Negative for activity change, appetite change and fever. HENT: Negative for congestion and nosebleeds. Eyes: Negative for discharge. Respiratory: Negative for cough and wheezing. Cardiovascular: Negative for chest pain and leg swelling. Gastrointestinal: Negative for abdominal pain, diarrhea, nausea and vomiting. Genitourinary: Negative for difficulty urinating, frequency and urgency. Musculoskeletal: Negative for back pain and gait problem. Skin: Negative for color change and rash. Neurological: Negative for dizziness and headaches. Hematological: Does not bruise/bleed easily. Psychiatric/Behavioral: Negative for sleep disturbance and suicidal ideas. Prior to Visit Medications    Medication Sig Taking?  Authorizing Provider   citalopram (CELEXA) 40 MG tablet Take 1 tablet by mouth daily Yes Juanita Pritchett MD   traZODone (DESYREL) 100 MG tablet Take 0.5 tablets by mouth nightly Yes Jenny Lynn MD   LORazepam (ATIVAN) 1 MG tablet Take 1 tablet by mouth 2 times daily as needed for Anxiety for up to 30 days.  Yes Mikaela Burger MD   Prucalopride Succinate (MOTEGRITY) 2 MG TABS Take by mouth Yes Historical Provider, MD   spironolactone (ALDACTONE) 25 MG tablet Take 25 mg by mouth daily Yes Historical Provider, MD   ibuprofen (ADVIL;MOTRIN) 600 MG tablet Take 1 tablet by mouth 3 times daily as needed for Pain Yes Mikaela Burger MD   rOPINIRole (REQUIP) 0.5 MG tablet TAKE 1 TABLET THREE TIMES A DAY Yes Jenny Lynn MD   AIMOVIG 70 MG/ML SOAJ INJECT 70 MG (ONE AUTO INJECTOR) INTO THE SKIN EVERY 27 DAYS Yes PINKY Acharya   valACYclovir (VALTREX) 1 g tablet Take 2,000 mg by mouth 2 times daily as needed (PRN) Indications: COLD SORES  Yes Historical Provider, MD   omeprazole (PRILOSEC) 40 MG delayed release capsule Take 40 mg by mouth daily  Yes Historical Provider, MD   glycopyrrolate (ROBINUL) 2 MG tablet Take 2 mg by mouth 2 times daily Yes Historical Provider, MD       Social History     Tobacco Use    Smoking status: Never Smoker    Smokeless tobacco: Never Used   Substance Use Topics    Alcohol use: Yes     Comment: RARELY    Drug use: No        Allergies   Allergen Reactions    Doxycycline     Minocycline     Pcn [Penicillins] Palpitations   ,   Past Medical History:   Diagnosis Date    Anxiety     Bell's palsy     Chronic migraine without aura, intractable, without status migrainosus 8/10/2017    Depression     Headache     Insomnia     Intractable migraine without aura and without status migrainosus 8/10/2017    Irritable bowel syndrome     Kidney stone 10/2017    Osteoarthritis     Periodic limb movement disorder (PLMD)     Snoring     PSG, 7/2016-mild   ,   Past Surgical History:   Procedure Laterality Date    ANKLE SURGERY Right     CYSTOURETHROSCOPY/STENT REMOVAL      HYSTERECTOMY N/A 11/21/2019    TOTAL LAPAROSCOPIC HYSTERECTOMY WITH DAVINCI CYSTOSCOPY performed by Sona Tapia MD at [address] [place] [address] emergency declaration under the [facility], [facility] waiver authority and the [employer] Act, this Virtual  Visit was conducted, with patient's consent, to reduce the patient's risk of exposure to COVID-19 and provide continuity of care for an established patient. Services were provided through a video synchronous discussion virtually to substitute for in-person clinic visit.

## 2021-01-12 ENCOUNTER — OFFICE VISIT (OUTPATIENT)
Dept: PRIMARY CARE CLINIC | Age: 41
End: 2021-01-12
Payer: OTHER GOVERNMENT

## 2021-01-12 VITALS
DIASTOLIC BLOOD PRESSURE: 70 MMHG | SYSTOLIC BLOOD PRESSURE: 100 MMHG | BODY MASS INDEX: 31.8 KG/M2 | RESPIRATION RATE: 16 BRPM | TEMPERATURE: 97.1 F | HEART RATE: 108 BPM | HEIGHT: 60 IN | WEIGHT: 162 LBS

## 2021-01-12 DIAGNOSIS — G43.719 CHRONIC MIGRAINE WITHOUT AURA, INTRACTABLE, WITHOUT STATUS MIGRAINOSUS: ICD-10-CM

## 2021-01-12 DIAGNOSIS — H60.501 ACUTE OTITIS EXTERNA OF RIGHT EAR, UNSPECIFIED TYPE: Primary | ICD-10-CM

## 2021-01-12 DIAGNOSIS — F41.9 ANXIETY: ICD-10-CM

## 2021-01-12 DIAGNOSIS — Z23 NEED FOR TDAP VACCINATION: ICD-10-CM

## 2021-01-12 DIAGNOSIS — G47.00 INSOMNIA, UNSPECIFIED TYPE: ICD-10-CM

## 2021-01-12 DIAGNOSIS — J01.00 ACUTE NON-RECURRENT MAXILLARY SINUSITIS: ICD-10-CM

## 2021-01-12 PROCEDURE — 99214 OFFICE O/P EST MOD 30 MIN: CPT | Performed by: FAMILY MEDICINE

## 2021-01-12 PROCEDURE — 90471 IMMUNIZATION ADMIN: CPT | Performed by: FAMILY MEDICINE

## 2021-01-12 PROCEDURE — 90715 TDAP VACCINE 7 YRS/> IM: CPT | Performed by: FAMILY MEDICINE

## 2021-01-12 RX ORDER — CEFUROXIME AXETIL 250 MG/1
250 TABLET ORAL 2 TIMES DAILY
Qty: 20 TABLET | Refills: 0 | Status: SHIPPED | OUTPATIENT
Start: 2021-01-12 | End: 2021-01-22

## 2021-01-12 RX ORDER — CIPROFLOXACIN/HYDROCORTISONE 0.2 %-1 %
4 SUSPENSION, DROPS(FINAL DOSAGE FORM)(ML) OTIC (EAR) 2 TIMES DAILY
Qty: 10 ML | Refills: 0 | Status: SHIPPED | OUTPATIENT
Start: 2021-01-12 | End: 2021-01-19

## 2021-01-12 RX ORDER — FLUTICASONE PROPIONATE 50 MCG
2 SPRAY, SUSPENSION (ML) NASAL DAILY
Qty: 1 BOTTLE | Refills: 3 | Status: SHIPPED | OUTPATIENT
Start: 2021-01-12 | End: 2021-07-20

## 2021-01-12 ASSESSMENT — PATIENT HEALTH QUESTIONNAIRE - PHQ9
SUM OF ALL RESPONSES TO PHQ QUESTIONS 1-9: 2
1. LITTLE INTEREST OR PLEASURE IN DOING THINGS: 1
SUM OF ALL RESPONSES TO PHQ QUESTIONS 1-9: 2
2. FEELING DOWN, DEPRESSED OR HOPELESS: 1
SUM OF ALL RESPONSES TO PHQ QUESTIONS 1-9: 2

## 2021-01-12 NOTE — PROGRESS NOTES
After obtaining consent, and per orders of Dr. Isabel Winchester, injection of Tdap given in Right deltoid by Aura Maldonado and witnessed by Terry Villarreal.

## 2021-01-13 ASSESSMENT — ENCOUNTER SYMPTOMS
EYE DISCHARGE: 0
RHINORRHEA: 1
SINUS PAIN: 1
DIARRHEA: 0
ABDOMINAL PAIN: 0
COUGH: 0
BACK PAIN: 0
WHEEZING: 0
VOMITING: 0
SINUS PRESSURE: 1
NAUSEA: 0
COLOR CHANGE: 0

## 2021-01-13 NOTE — PROGRESS NOTES
SUBJECTIVE:    Patient ID: Smiley Moore is a 36 y.o. female. HPI:   Patient is here today for 6-month follow-up. She states that she feels like her current medications are working well for her anxiety and depression. She is currently on citalopram.  She does feel like the trazodone is also helping with her insomnia. She is currently doing 50 mg of that. She states that she is tolerating it well. She does have a history of migraines. She is on Aimovig. She states that this is working well for prevention for her. She would like to continue her current dose. She states she is having some calf pain. This is been going on for several weeks. She states that it is located bilaterally. She states that her calves just feel tight. She has not had any redness or warmth. She states that she has not had any blood clots previously. She denies any change in activity. She states that she has not changed any medications. She denies any rash. She states she has also had some sinus congestion and pressure over the last few days. She states that she does have a sinus headache. She states it is located in the frontal region. She states that she has not had any fevers or chills. She states that she has not had any known Covid exposure. She denies any body aches, diarrhea, or loss of taste or smell. She does have some itching of the right ear canal as well. She states that she has not used anything in it. She denies any ear drainage. She denies any trauma to her ear recently.     Past Medical History:   Diagnosis Date    Anxiety     Bell's palsy     Chronic migraine without aura, intractable, without status migrainosus 8/10/2017    Depression     Headache     Insomnia     Intractable migraine without aura and without status migrainosus 8/10/2017    Irritable bowel syndrome     Kidney stone 10/2017    Osteoarthritis     Periodic limb movement disorder (PLMD)     Snoring     PSG, 7/2016-mild Current Outpatient Medications   Medication Sig Dispense Refill    ciprofloxacin-hydrocortisone (CIPRO HC) 0.2-1 % otic suspension Place 4 drops into the right ear 2 times daily for 7 days 10 mL 0    fluticasone (FLONASE) 50 MCG/ACT nasal spray 2 sprays by Nasal route daily 1 Bottle 3    cefUROXime (CEFTIN) 250 MG tablet Take 1 tablet by mouth 2 times daily for 10 days 20 tablet 0    citalopram (CELEXA) 40 MG tablet Take 1 tablet by mouth daily 90 tablet 3    traZODone (DESYREL) 100 MG tablet Take 0.5 tablets by mouth nightly 90 tablet 3    Prucalopride Succinate (MOTEGRITY) 2 MG TABS Take by mouth      spironolactone (ALDACTONE) 25 MG tablet Take 25 mg by mouth daily      ibuprofen (ADVIL;MOTRIN) 600 MG tablet Take 1 tablet by mouth 3 times daily as needed for Pain 90 tablet 0    rOPINIRole (REQUIP) 0.5 MG tablet TAKE 1 TABLET THREE TIMES A DAY 90 tablet 12    AIMOVIG 70 MG/ML SOAJ INJECT 70 MG (ONE AUTO INJECTOR) INTO THE SKIN EVERY 30 DAYS 1 mL 12    valACYclovir (VALTREX) 1 g tablet Take 2,000 mg by mouth 2 times daily as needed (PRN) Indications: COLD SORES       omeprazole (PRILOSEC) 40 MG delayed release capsule Take 40 mg by mouth daily       glycopyrrolate (ROBINUL) 2 MG tablet Take 2 mg by mouth 2 times daily       No current facility-administered medications for this visit. Allergies   Allergen Reactions    Doxycycline     Minocycline     Pcn [Penicillins] Palpitations       Review of Systems   Constitutional: Positive for fatigue. Negative for activity change, appetite change and fever. HENT: Positive for congestion, postnasal drip, rhinorrhea, sinus pressure and sinus pain. Negative for nosebleeds. +right ear itching   Eyes: Negative for discharge. Respiratory: Negative for cough and wheezing. Cardiovascular: Negative for chest pain and leg swelling. Gastrointestinal: Negative for abdominal pain, diarrhea, nausea and vomiting.    Genitourinary: Negative for difficulty urinating, frequency and urgency. Musculoskeletal: Negative for back pain and gait problem. Skin: Negative for color change and rash. Neurological: Positive for headaches. Negative for dizziness. Hematological: Does not bruise/bleed easily. Psychiatric/Behavioral: Negative for sleep disturbance and suicidal ideas. OBJECTIVE:     Physical Exam  Vitals signs reviewed. Constitutional:       General: She is not in acute distress. Appearance: Normal appearance. She is well-developed. She is not diaphoretic. HENT:      Head: Normocephalic and atraumatic. Right Ear: External ear normal. No tenderness. Left Ear: External ear normal.      Ears:      Comments: Right ear canal is irritated and erythematous with a dry, scaly patch in the canal.     Nose: Congestion present. Right Sinus: Maxillary sinus tenderness present. No frontal sinus tenderness. Left Sinus: Maxillary sinus tenderness present. No frontal sinus tenderness. Neck:      Musculoskeletal: Normal range of motion and neck supple. Cardiovascular:      Rate and Rhythm: Normal rate and regular rhythm. Pulses: Normal pulses. Heart sounds: Normal heart sounds. No murmur. Pulmonary:      Effort: Pulmonary effort is normal. No respiratory distress. Breath sounds: Normal breath sounds. Skin:     General: Skin is warm and dry. Neurological:      General: No focal deficit present. Mental Status: She is alert and oriented to person, place, and time. Mental status is at baseline. Psychiatric:         Mood and Affect: Mood normal.         Behavior: Behavior normal.         Thought Content:  Thought content normal.         Judgment: Judgment normal.        /70 (Site: Left Upper Arm, Position: Sitting, Cuff Size: Large Adult)   Pulse 108   Temp 97.1 °F (36.2 °C) (Temporal)   Resp 16   Ht 5' (1.524 m)   Wt 162 lb (73.5 kg)   LMP 10/19/2019   BMI 31.64 kg/m²      ASSESSMENT: Keyanna Mathias was seen today for 6 month follow-up, fatigue, sinusitis and leg pain. Diagnoses and all orders for this visit:    Acute otitis externa of right ear, unspecified type    Anxiety    Insomnia, unspecified type    Chronic migraine without aura, intractable, without status migrainosus    Need for Tdap vaccination  -     Tdap (age 6y and older) IM (BOOSTRIX)    Acute non-recurrent maxillary sinusitis    Other orders  -     ciprofloxacin-hydrocortisone (CIPRO HC) 0.2-1 % otic suspension; Place 4 drops into the right ear 2 times daily for 7 days  -     fluticasone (FLONASE) 50 MCG/ACT nasal spray; 2 sprays by Nasal route daily  -     cefUROXime (CEFTIN) 250 MG tablet; Take 1 tablet by mouth 2 times daily for 10 days        PLAN:    Continue current medications. See new prescription orders today for otitis externa and sinusitis. Tdap was given in office today. Follow-up with us if symptoms are not improving otherwise we will see her back in 6 months for a checkup unless needed sooner. EMR Dragon/transcription disclaimer:  Much of this encounter note is electronic transcription/translation of spoken language toprinted texts. The electronic translation of spoken language may be erroneous, or at times, nonsensical words or phrases may be inadvertently transcribed.   Although I have reviewed the note for such errors, some may stillexist.

## 2021-02-08 ENCOUNTER — TELEPHONE (OUTPATIENT)
Dept: NEUROLOGY | Age: 41
End: 2021-02-08

## 2021-02-08 NOTE — TELEPHONE ENCOUNTER
Called and spoke with the pt to see if they would like to reschedule no show appt with Caitlyn Forte.  Pt rescheduled

## 2021-03-16 ENCOUNTER — TELEMEDICINE (OUTPATIENT)
Dept: NEUROLOGY | Age: 41
End: 2021-03-16
Payer: OTHER GOVERNMENT

## 2021-03-16 DIAGNOSIS — G43.719 CHRONIC MIGRAINE WITHOUT AURA, INTRACTABLE, WITHOUT STATUS MIGRAINOSUS: Primary | ICD-10-CM

## 2021-03-16 PROCEDURE — 99213 OFFICE O/P EST LOW 20 MIN: CPT | Performed by: PHYSICIAN ASSISTANT

## 2021-03-16 NOTE — PATIENT INSTRUCTIONS
headache, such as nausea, flashing lights or dark spots, or sensitivity to bright light or loud noise. Note if the headache occurred near your period. List anything that might have triggered the headache. Triggers may include certain foods (chocolate, cheese, wine) or odors, smoke, bright light, stress, or lack of sleep. · If your doctor has prescribed medicine for your migraines, take it as directed. You may have medicine that you take only when you get a migraine and medicine that you take all the time to help prevent migraines. ? If your doctor has prescribed medicine for when you get a headache, take it at the first sign of a migraine, unless your doctor has given you other instructions. ? If your doctor has prescribed medicine to prevent migraines, take it exactly as prescribed. Call your doctor if you think you are having a problem with your medicine. · Find healthy ways to deal with stress. Migraines are most common during or right after stressful times. Try finding ways to reduce stress like practicing mindfulness or deep breathing exercises. · Get plenty of sleep and exercise. But be careful to not push yourself too hard during exercise. It may trigger a headache. · Eat meals on a regular schedule. Avoid foods and drinks that often trigger migraines. These include chocolate, alcohol (especially red wine and port), aspartame, monosodium glutamate (MSG), and some additives found in foods (such as hot dogs, griffin, cold cuts, aged cheeses, and pickled foods). · Limit caffeine. Don't drink too much coffee, tea, or soda. But don't quit caffeine suddenly. That can also give you migraines. · Do not smoke or allow others to smoke around you. If you need help quitting, talk to your doctor about stop-smoking programs and medicines. These can increase your chances of quitting for good.   · If you are taking birth control pills or hormone therapy, talk to your doctor about whether they are triggering your

## 2021-03-16 NOTE — PROGRESS NOTES
[facility]  [address] [place] [address] Drive, [address] [facility] [address]  [facility]  Phone [phone]  Fax [phone]       3/16/2021    TELEHEALTH EVALUATION -- Audio/Visual (During UNION-41 public health emergency)      Patient:   Karan Mora  MR#:    [id number]  Account Number:                         YOB: 1980  Primary/Referring Physician:  Ilana Melvin MD   Consulting Physician:   Minerva Avila PA-C    NEW PATIENT CONSULTATION    OR    FOLLOW UP    Karan Mora is located at:  Home  Also present during visit:  Nobody  Provider is located at [facility] in [place], [facility] [address]    Chief Complaint   Patient presents with    Migraine     chronic migraine       HPI:    Karan Mora (:  1980) has requested an audio/video evaluation for the following concern(s): Sleep clinic follow up      Karan Mora is a 36 y.o. female who has a history of migraines, snoring, and PLMD who comes in for a follow up. She is prescribed Aimovig. She has a prescription for Relpax, as well. The migraines are improved. She really doesn't remember when she last had a migraine. The PLMD comes and goes. She takes a lorazepam PRN and it helps.     Migraine:   Pain location:  frontal  Onset:  Early 20's  Character:  Throbbing  Progression: Improved    Similar to prior headaches:  Yes  Associated:   Nausea, vomiting, photophobia, and phonophobia  Aggravated:  Menses, allergies, or heat   Treatments tried:  Sleep and a dark room; Fioricet; cold gel mask; Excedrin, Topamax, Maxalt, Relpax, Periactin; hot shower; She has stopped using Aimovig. 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  Alleviated: Usually Relpax but she sometimes has to take 2;  Aimovig       Past Medical History:   Diagnosis Date    Anxiety     Bell's palsy     Chronic migraine without aura, intractable, without status migrainosus 8/10/2017    Depression     Headache     Insomnia     Intractable migraine without aura and without status migrainosus 8/10/2017    Irritable bowel syndrome     Kidney stone 10/2017    Osteoarthritis     Periodic limb movement disorder (PLMD)     Snoring     PSG, 7/2016-mild       Past Surgical History:   Procedure Laterality Date    ANKLE SURGERY Right     CYSTOURETHROSCOPY/STENT REMOVAL      HYSTERECTOMY N/A 11/21/2019    TOTAL LAPAROSCOPIC HYSTERECTOMY WITH DAVINCI CYSTOSCOPY performed by Marlin Mazariegos MD at [address]      X 2       Family History   Problem Relation Age of Onset    Osteoporosis Mother        Social History     Socioeconomic History    Marital status:      Spouse name: Not on file    Number of children: Not on file    Years of education: Not on file    Highest education level: Not on file   Occupational History    Not on file   Social Needs    Financial resource strain: Not on file    Food insecurity     Worry: Not on file     Inability: Not on file    Transportation needs     Medical: Not on file     Non-medical: Not on file   Tobacco Use    Smoking status: Never Smoker    Smokeless tobacco: Never Used   Substance and Sexual Activity    Alcohol use: Yes     Comment: RARELY    Drug use: No    Sexual activity: Yes     Partners: Male   Lifestyle    Physical activity     Days per week: Not on file     Minutes per session: Not on file    Stress: Not on file   Relationships    Social connections     Talks on phone: Not on file     Gets together: Not on file     Attends Restorationism service: Not on file     Active member of club or organization: Not on file     Attends meetings of clubs or organizations: Not on file     Relationship status: Not on file    Intimate partner violence     Fear of current or ex partner: Not on file     Emotionally abused: Not on file     Physically abused: Not on file     Forced sexual activity: Not on file Other Topics Concern    Not on file   Social History Narrative    Not on file       Current Outpatient Medications   Medication Sig Dispense Refill    fluticasone (FLONASE) 50 MCG/ACT nasal spray 2 sprays by Nasal route daily 1 Bottle 3    citalopram (CELEXA) 40 MG tablet Take 1 tablet by mouth daily 90 tablet 3    traZODone (DESYREL) 100 MG tablet Take 0.5 tablets by mouth nightly 90 tablet 3    Prucalopride Succinate (MOTEGRITY) 2 MG TABS Take by mouth      spironolactone (ALDACTONE) 25 MG tablet Take 25 mg by mouth daily      ibuprofen (ADVIL;MOTRIN) 600 MG tablet Take 1 tablet by mouth 3 times daily as needed for Pain 90 tablet 0    rOPINIRole (REQUIP) 0.5 MG tablet TAKE 1 TABLET THREE TIMES A DAY 90 tablet 12    AIMOVIG 70 MG/ML SOAJ INJECT 70 MG (ONE AUTO INJECTOR) INTO THE SKIN EVERY 30 DAYS 1 mL 12    valACYclovir (VALTREX) 1 g tablet Take 2,000 mg by mouth 2 times daily as needed (PRN) Indications: COLD SORES       omeprazole (PRILOSEC) 40 MG delayed release capsule Take 40 mg by mouth daily       glycopyrrolate (ROBINUL) 2 MG tablet Take 2 mg by mouth 2 times daily       No current facility-administered medications for this visit. Allergies   Allergen Reactions    Doxycycline     Minocycline     Pcn [Penicillins] Palpitations       REVIEW OF SYSTEMS     Constitutional: []? Fever []? Sweats []? Chills []? Recent Injury   [x]? Denies all unless marked  HENT:[x]? Headache  []? Head Injury  []? Sore Throat  []? Ear Pain  []? Dizziness []? Hearing Loss   []? Denies all unless marked  Musculoskeletal: []? Arthralgia  []? Myalgias []? Muscle cramps  []? Muscle twitches   [x]? Denies all unless marked   Spine:  []? Neck pain  []? Back pain  []? Sciaticia  [x]? Denies all unless marked  Neurological:[]? Visual Disturbance []? Double Vision []? Slurred Speech []? Trouble swallowing  []? Vertigo []? Tingling []? Numbness []? Weakness []? Loss of Balance   []? Loss of Consciousness []?  Memory Loss []? Seizures  [x]? Denies all unless marked  Psychiatric/Behavioral:[]? Depression []? Anxiety  [x]? Denies all unless marked  Sleep: []? Insomnia []? Sleep Disturbance []? Snoring []? Restless Legs []? Daytime Sleepiness []? Sleep Apnea  [x]? Denies all unless marked    The MA has completed the ROS with the patient. I have reviewed it in its' entirety with the patient and agree with the documentation. PHYSICAL EXAMINATION:  Constitutional    General appearance: Alert in no acute distress, well nourished, and  well developed. EYES -   Sclera and lids appears normal.  ENT-    Hearing intact. Ears and external nose on visible skin appear normal. Trachea appears midline. No observable anterior neck masses. No observable or audible rhinorrhea, and oral mucous membranes are moist without erythema. Pulmonary-   Breathing appears normal, good expansion, and normal effort without use of accessory muscles. Musculoskeletal    No gross bony deformities. No splints, slings, or casts. Spine appears normal with normal posture and range of motion. Gait as below, see gait exam in the neurologic exam.  Skin    No rash, erythema, or pallor on visible skin  Psychiatric    Mood, affect, and behavior appear normal.   Memory as below see mental status examination in the neurologic exam.    NEUROLOGICAL EXAM    Mental status   [x]Awake, alert, oriented   [x]Affect attention and concentration appear appropriate  [x]Recent and remote memory appears unremarkable  [x]Speech normal without dysarthria or aphasia, comprehension and repetition intact.    COMMENTS:    Cranial Nerves [x] PER, EOMI, no nystagmus, conjugate eye movements, no ptosis  [x]Face symmetric  [x]Tongue midline   [x]Shoulder shrug normal bilaterally  COMMENTS:   Motor   [x]Antigravity x 4 extremities  [x]Normal visible bulk and tone  [x]No tremor present  COMMENTS:       Coordination [x]ERNESTO normal bilaterally  [x]Extension to nose normal bilaterally  COMMENTS: Gait                  [x]Normal steady gait    []Ataxic    []Spastic     []Magnetic     []Shuffling  COMMENTS:       [] OTHER:      Due to this being a TeleHealth encounter, evaluation of the following organ systems is limited: Vitals/Constitutional/EENT/Resp/CV/GI//MS/Neuro/Skin/Heme-Lymph-Imm. LABS RECORD AND IMAGING REVIEW (As below and per HPI)      I reviewed the following studies:       []  :  Clinical laboratory test results    []  :  Radiology reports    [x]  :  Review and summarization of medical records and/or obtain medical records     []  :  Previous/recent polysomnogram report(s)    []  :  [place] Sleepiness Scale      ASSESSMENT:    Aysha Cloud is a 36y.o. year old female evaluated via Telehealth encounter for migraine/medication management. ICD-10-CM    1. Chronic migraine without aura, intractable, without status migrainosus  G43.719           [x]  :  Stable     []  :  Improved                       []  :  Well controlled              []  :  Resolving     []  :  Resolved     []  :  Inadequately controlled     []  :  Worsening     []  :  Additional workup planned        PLAN:  No orders of the defined types were placed in this encounter. 1.   Previously reviewed the etiology,  pathophysiology, diagnosis, treatment options, for migraines  2. The following educational material has been included in this visit after visit summary for your review: migraines--Discussed with the patient and all questions fully answered. 3. The current medical regimen is effective;  continue present plan and medications.   4.  Follow up in 1 year          Pursuant to the emergency declaration under the [facility], [facility]5 waiver authority and the [email or web address] and [employer] Act, this Virtual  Visit was conducted, with patient's consent, to reduce the patient's risk of exposure to COVID-19 and provide continuity of care for an established patient. Services were provided through a video synchronous discussion virtually to substitute for in-person clinic visit.

## 2021-03-16 NOTE — PROGRESS NOTES
REVIEW OF SYSTEMS    Constitutional: []Fever []Sweats []Chills [] Recent Injury   [x] Denies all unless marked  HENT:[x]Headache  [] Head Injury  [] Sore Throat  [] Ear Pain  [] Dizziness [] Hearing Loss   [] Denies all unless marked  Musculoskeletal: [] Arthralgia  [] Myalgias [] Muscle cramps  [] Muscle twitches   [x] Denies all unless marked   Spine:  [] Neck pain  [] Back pain  [] Sciaticia  [x] Denies all unless marked  Neurological:[] Visual Disturbance [] Double Vision [] Slurred Speech [] Trouble swallowing  [] Vertigo [] Tingling [] Numbness [] Weakness [] Loss of Balance   [] Loss of Consciousness [] Memory Loss [] Seizures  [x] Denies all unless marked  Psychiatric/Behavioral:[] Depression [] Anxiety  [x] Denies all unless marked  Sleep: []  Insomnia [] Sleep Disturbance [] Snoring [] Restless Legs [] Daytime Sleepiness [] Sleep Apnea  [x] Denies all unless marked

## 2021-03-17 RX ORDER — ROPINIROLE 0.5 MG/1
TABLET, FILM COATED ORAL
Qty: 90 TABLET | Refills: 11 | Status: SHIPPED | OUTPATIENT
Start: 2021-03-17 | End: 2021-09-08 | Stop reason: SDUPTHER

## 2021-05-06 RX ORDER — ERENUMAB-AOOE 70 MG/ML
INJECTION SUBCUTANEOUS
Qty: 1 ML | Refills: 11 | Status: SHIPPED | OUTPATIENT
Start: 2021-05-06

## 2021-05-06 NOTE — TELEPHONE ENCOUNTER
Requested Prescriptions     Pending Prescriptions Disp Refills    AIMOVIG 70 MG/ML SOAJ [Pharmacy Med Name: AIMOVIG AUTOINJECTOR 1ML 70MG] 1 mL 11     Sig: INJECT 70 MG (ONE AUTO INJECTOR) INTO THE SKIN EVERY 30 DAYS       Last Office Visit: 3/16/21  Next Office Visit: 3/16/22  Last Medication Refill: 12/2/19 with 12 refills

## 2021-07-20 ENCOUNTER — TELEMEDICINE (OUTPATIENT)
Dept: PRIMARY CARE CLINIC | Age: 41
End: 2021-07-20
Payer: OTHER GOVERNMENT

## 2021-07-20 DIAGNOSIS — G43.719 CHRONIC MIGRAINE WITHOUT AURA, INTRACTABLE, WITHOUT STATUS MIGRAINOSUS: ICD-10-CM

## 2021-07-20 DIAGNOSIS — M15.9 OSTEOARTHRITIS OF MULTIPLE JOINTS, UNSPECIFIED OSTEOARTHRITIS TYPE: ICD-10-CM

## 2021-07-20 DIAGNOSIS — F41.9 ANXIETY: Primary | ICD-10-CM

## 2021-07-20 DIAGNOSIS — G47.00 INSOMNIA, UNSPECIFIED TYPE: ICD-10-CM

## 2021-07-20 DIAGNOSIS — R53.83 FATIGUE, UNSPECIFIED TYPE: ICD-10-CM

## 2021-07-20 PROCEDURE — 99214 OFFICE O/P EST MOD 30 MIN: CPT | Performed by: FAMILY MEDICINE

## 2021-07-20 RX ORDER — IBUPROFEN 600 MG/1
600 TABLET ORAL 3 TIMES DAILY PRN
Qty: 90 TABLET | Refills: 2 | Status: SHIPPED | OUTPATIENT
Start: 2021-07-20 | End: 2021-07-28 | Stop reason: SDUPTHER

## 2021-07-20 RX ORDER — LORAZEPAM 1 MG/1
1 TABLET ORAL 2 TIMES DAILY PRN
COMMUNITY
End: 2021-07-20 | Stop reason: SDUPTHER

## 2021-07-20 RX ORDER — LORAZEPAM 1 MG/1
1 TABLET ORAL 2 TIMES DAILY PRN
Qty: 60 TABLET | Refills: 2 | Status: SHIPPED | OUTPATIENT
Start: 2021-07-20 | End: 2021-07-28 | Stop reason: SDUPTHER

## 2021-07-21 ASSESSMENT — ENCOUNTER SYMPTOMS
DIARRHEA: 0
BACK PAIN: 0
COUGH: 0
NAUSEA: 0
COLOR CHANGE: 0
EYE DISCHARGE: 0
ABDOMINAL PAIN: 0
VOMITING: 0
WHEEZING: 0

## 2021-07-21 NOTE — PROGRESS NOTES
tablet Take 1 tablet by mouth 2 times daily as needed for Anxiety for up to 30 days.  Yes Edis Dowling MD   ibuprofen (ADVIL;MOTRIN) 600 MG tablet Take 1 tablet by mouth 3 times daily as needed for Pain Yes Jenny Lynn MD   AIMOVIG 70 MG/ML SOAJ INJECT 70 MG (ONE AUTO INJECTOR) INTO THE SKIN EVERY 30 DAYS Yes JAKE Bob   rOPINIRole (REQUIP) 0.5 MG tablet TAKE 1 TABLET THREE TIMES A DAY Yes Jenny Lynn MD   citalopram (CELEXA) 40 MG tablet Take 1 tablet by mouth daily Yes Edis Dowling MD   traZODone (DESYREL) 100 MG tablet Take 0.5 tablets by mouth nightly Yes Jenny Lynn MD   Prucalopride Succinate (MOTEGRITY) 2 MG TABS Take 2 mg by mouth daily  Yes Historical Provider, MD   spironolactone (ALDACTONE) 25 MG tablet Take 25 mg by mouth daily Yes Historical Provider, MD   valACYclovir (VALTREX) 1 g tablet Take 2,000 mg by mouth 2 times daily as needed (PRN) Indications: COLD SORES  Yes Historical Provider, MD   omeprazole (PRILOSEC) 40 MG delayed release capsule Take 40 mg by mouth daily  Yes Historical Provider, MD   glycopyrrolate (ROBINUL) 2 MG tablet Take 2 mg by mouth 2 times daily Yes Historical Provider, MD       Social History     Tobacco Use    Smoking status: Never Smoker    Smokeless tobacco: Never Used   Vaping Use    Vaping Use: Never used   Substance Use Topics    Alcohol use: Yes     Comment: RARELY    Drug use: No        Allergies   Allergen Reactions    Doxycycline     Minocycline     Pcn [Penicillins] Palpitations   ,   Past Medical History:   Diagnosis Date    Anxiety     Bell's palsy     Chronic migraine without aura, intractable, without status migrainosus 8/10/2017    Depression     Headache     Insomnia     Intractable migraine without aura and without status migrainosus 8/10/2017    Irritable bowel syndrome     Kidney stone 10/2017    Osteoarthritis     Periodic limb movement disorder (PLMD)     Snoring PSG, 7/2016-mild   ,   Past Surgical History:   Procedure Laterality Date    ANKLE SURGERY Right     CYSTOURETHROSCOPY/STENT REMOVAL      HYSTERECTOMY N/A 11/21/2019    TOTAL LAPAROSCOPIC HYSTERECTOMY WITH DAVINCI CYSTOSCOPY performed by Serafin Geller MD at [address]      X 2   ,   Social History     Tobacco Use    Smoking status: Never Smoker    Smokeless tobacco: Never Used   Vaping Use    Vaping Use: Never used   Substance Use Topics    Alcohol use: Yes     Comment: RARELY    Drug use: No   ,   Family History   Problem Relation Age of Onset    Osteoporosis Mother        PHYSICAL EXAMINATION:  Physical Exam  Constitutional:       General: She is not in acute distress. Appearance: Normal appearance. She is not ill-appearing. HENT:      Head: Normocephalic and atraumatic. Nose: Nose normal.   Eyes:      Extraocular Movements: Extraocular movements intact. Conjunctiva/sclera: Conjunctivae normal.   Musculoskeletal:      Cervical back: Normal range of motion. Skin:     Findings: No rash. Neurological:      General: No focal deficit present. Mental Status: She is alert and oriented to person, place, and time. Mental status is at baseline. Cranial Nerves: No cranial nerve deficit. Psychiatric:         Attention and Perception: Attention normal.         Mood and Affect: Mood and affect normal.         Speech: Speech normal.         Behavior: Behavior normal. Behavior is cooperative. Thought Content: Thought content normal.         Cognition and Memory: Cognition and memory normal.         Judgment: Judgment normal.         Due to this being a TeleHealth encounter, evaluation of the following organ systems is limited: Vitals/Constitutional/EENT/Resp/CV/GI//MS/Neuro/Skin/Heme-Lymph-Imm. ASSESSMENT/PLAN:  1. Anxiety  I have refilled her Ativan today for her to take as needed. - LORazepam (ATIVAN) 1 MG tablet;  Take 1 tablet by mouth 2 times daily as needed for Anxiety for up to 30 days. Dispense: 60 tablet; Refill: 2    2. Insomnia, unspecified type    3. Chronic migraine without aura, intractable, without status migrainosus  Continue Aimovig as this is controlling her symptoms for her migraines well. She can continue her ibuprofen as needed. 4. Fatigue, unspecified type    5. Osteoarthritis of multiple joints, unspecified osteoarthritis type  Continue ibuprofen as needed and this has been refilled today. Return in about 6 months (around 1/20/2022), or if symptoms worsen or fail to improve, for 6 month follow up. An  electronic signature was used to authenticate this note. --Susana East MD on 7/21/2021 at 7:48 AM      Pursuant to the emergency declaration under the [facility] waiver authority and the [employer] and [employer] Act, this Virtual  Visit was conducted, with patient's consent, to reduce the patient's risk of exposure to COVID-19 and provide continuity of care for an established patient. Services were provided through a video synchronous discussion virtually to substitute for in-person clinic visit.

## 2021-07-28 DIAGNOSIS — F41.9 ANXIETY: ICD-10-CM

## 2021-07-28 RX ORDER — IBUPROFEN 600 MG/1
600 TABLET ORAL 3 TIMES DAILY PRN
Qty: 90 TABLET | Refills: 2 | Status: SHIPPED | OUTPATIENT
Start: 2021-07-28

## 2021-07-28 RX ORDER — LORAZEPAM 1 MG/1
1 TABLET ORAL 2 TIMES DAILY PRN
Qty: 60 TABLET | Refills: 0 | Status: SHIPPED | OUTPATIENT
Start: 2021-07-28 | End: 2021-10-26 | Stop reason: SDUPTHER

## 2021-08-19 ENCOUNTER — PATIENT MESSAGE (OUTPATIENT)
Dept: PRIMARY CARE CLINIC | Age: 41
End: 2021-08-19

## 2021-08-19 DIAGNOSIS — G47.00 INSOMNIA, UNSPECIFIED TYPE: Primary | ICD-10-CM

## 2021-08-19 RX ORDER — ZOLPIDEM TARTRATE 12.5 MG/1
12.5 TABLET, FILM COATED, EXTENDED RELEASE ORAL NIGHTLY PRN
Qty: 30 TABLET | Refills: 0 | Status: SHIPPED | OUTPATIENT
Start: 2021-08-19 | End: 2021-08-31

## 2021-08-19 NOTE — TELEPHONE ENCOUNTER
From: Vida Chandler  To: Jacob Moreira MD  Sent: 8/19/2021 7:09 AM CDT  Subject: Prescription Question    Good morning, I was wondering if there was anyway to switch my sleep medication back to the CR Ambien. The trazodone isn't working well for me anymore. I started taking half of it then 3/4 and it worked for a little, but either I'm not sleeping or extremely tired. It has come to the point when I take the 3/4 of the pill that I find myself not wanting to wake up. If we are able to switch can this medication be sent to the [facility].

## 2021-09-08 ENCOUNTER — PATIENT MESSAGE (OUTPATIENT)
Dept: PRIMARY CARE CLINIC | Age: 41
End: 2021-09-08

## 2021-09-08 RX ORDER — ROPINIROLE 1 MG/1
1 TABLET, FILM COATED ORAL 3 TIMES DAILY
Qty: 90 TABLET | Refills: 3 | Status: SHIPPED | OUTPATIENT
Start: 2021-09-08 | End: 2021-09-15 | Stop reason: SDUPTHER

## 2021-09-08 NOTE — TELEPHONE ENCOUNTER
From: Samina Lai  To: Kimi Feldman MD  Sent: 9/8/2021 12:49 PM CDT  Subject: Non-Urgent Medical Question    Good afternoon, I just wanted to touch bar with on my sleep meds. I've been taking melatonin and it helps somewhat but my restless leg is getting worse. It has been keeping me up for hours at night. The trazodone and ropinirole worked great together but since I'm not taking the trazodone my legs are going crazy and hurt really bad.

## 2021-09-15 RX ORDER — ROPINIROLE 1 MG/1
1 TABLET, FILM COATED ORAL 3 TIMES DAILY
Qty: 270 TABLET | Refills: 3 | Status: SHIPPED | OUTPATIENT
Start: 2021-09-15 | End: 2022-10-17

## 2021-10-26 ENCOUNTER — OFFICE VISIT (OUTPATIENT)
Dept: PRIMARY CARE CLINIC | Age: 41
End: 2021-10-26
Payer: OTHER GOVERNMENT

## 2021-10-26 VITALS
OXYGEN SATURATION: 98 % | RESPIRATION RATE: 16 BRPM | DIASTOLIC BLOOD PRESSURE: 88 MMHG | WEIGHT: 161.5 LBS | BODY MASS INDEX: 31.71 KG/M2 | HEART RATE: 76 BPM | TEMPERATURE: 97.7 F | SYSTOLIC BLOOD PRESSURE: 134 MMHG | HEIGHT: 60 IN

## 2021-10-26 DIAGNOSIS — F41.9 ANXIETY: ICD-10-CM

## 2021-10-26 DIAGNOSIS — G47.00 INSOMNIA, UNSPECIFIED TYPE: ICD-10-CM

## 2021-10-26 DIAGNOSIS — H60.541 DERMATITIS OF RIGHT EAR CANAL: Primary | ICD-10-CM

## 2021-10-26 PROCEDURE — 99214 OFFICE O/P EST MOD 30 MIN: CPT | Performed by: FAMILY MEDICINE

## 2021-10-26 RX ORDER — ESZOPICLONE 2 MG/1
2 TABLET, FILM COATED ORAL NIGHTLY
Qty: 30 TABLET | Refills: 0 | Status: SHIPPED | OUTPATIENT
Start: 2021-10-26 | End: 2021-11-30 | Stop reason: SDUPTHER

## 2021-10-26 RX ORDER — DAPSONE 75 MG/G
GEL TOPICAL
COMMUNITY
Start: 2021-09-15

## 2021-10-26 RX ORDER — LORAZEPAM 1 MG/1
1 TABLET ORAL 2 TIMES DAILY PRN
Qty: 60 TABLET | Refills: 2 | Status: SHIPPED | OUTPATIENT
Start: 2021-10-26 | End: 2021-11-25

## 2021-10-26 ASSESSMENT — ENCOUNTER SYMPTOMS
DIARRHEA: 0
EYE DISCHARGE: 0
ABDOMINAL PAIN: 0
BACK PAIN: 0
COLOR CHANGE: 0
WHEEZING: 0
VOMITING: 0
COUGH: 0
NAUSEA: 0

## 2021-10-26 NOTE — PATIENT INSTRUCTIONS
Patient Education        Tennis Elbow: Exercises  Introduction  Here are some examples of exercises for you to try. The exercises may be suggested for a condition or for rehabilitation. Start each exercise slowly. Ease off the exercises if you start to have pain. You will be told when to start these exercises and which ones will work best for you. How to do the exercises  Wrist flexor stretch    1. Extend your arm in front of you with your palm up. 2. Bend your wrist, pointing your hand toward the floor. 3. With your other hand, gently bend your wrist farther until you feel a mild to moderate stretch in your forearm. 4. Hold for at least 15 to 30 seconds. Repeat 2 to 4 times. Wrist extensor stretch    1. Repeat steps 1 to 4 of the stretch above but begin with your extended hand palm down. Ball or sock squeeze    1. Hold a tennis ball (or a rolled-up sock) in your hand. 2. Make a fist around the ball (or sock) and squeeze. 3. Hold for about 6 seconds, and then relax for up to 10 seconds. 4. Repeat 8 to 12 times. 5. Switch the ball (or sock) to your other hand and do 8 to 12 times. Wrist deviation    1. Sit so that your arm is supported but your hand hangs off the edge of a flat surface, such as a table. 2. Hold your hand out like you are shaking hands with someone. 3. Move your hand up and down. 4. Repeat this motion 8 to 12 times. 5. Switch arms. 6. Try to do this exercise twice with each hand. Wrist curls    1. Place your forearm on a table with your hand hanging over the edge of the table, palm up. 2. Place a 1- to 2-pound weight in your hand. This may be a dumbbell, a can of food, or a filled water bottle. 3. Slowly raise and lower the weight while keeping your forearm on the table and your palm facing up. 4. Repeat this motion 8 to 12 times. 5. Switch arms, and do steps 1 through 4.  6. Repeat with your hand facing down toward the floor. Switch arms. Biceps curls    1.  Sit leaning forward with your legs slightly spread and your left hand on your left thigh. 2. Place your right elbow on your right thigh, and hold the weight with your forearm horizontal.  3. Slowly curl the weight up and toward your chest.  4. Repeat this motion 8 to 12 times. 5. Switch arms, and do steps 1 through 4. Follow-up care is a key part of your treatment and safety. Be sure to make and go to all appointments, and call your doctor if you are having problems. It's also a good idea to know your test results and keep a list of the medicines you take. Where can you learn more? Go to https://[email or web address] org and sign in to your Surveying [employer] Mapping (SAM) account. Enter D871 in the Coolture box to learn more about \"Tennis Elbow: Exercises. \"     If you do not have an account, please click on the \"Sign Up Now\" link. Current as of: July 1, 2021               Content Version: 13.0  © 2006-2021 [employer]. Care instructions adapted under license by [facility] ([facility]). If you have questions about a medical condition or this instruction, always ask your healthcare professional. [place] [address] any warranty or liability for your use of this information. Patient Education        Tennis Elbow: Care Instructions  Overview     Tennis elbow is soreness or pain on the outer part of the elbow. The pain occurs when the tendon is stretched and becomes irritated by repeated twisting of the hand, wrist, and forearm. A tendon is a tough tissue that connects muscle to bone. This injury is common in tennis players. But you also can get it from many activities that work the same muscles. Examples include gardening, painting, and using tools. Tennis elbow usually heals with rest and treatment at home. Follow-up care is a key part of your treatment and safety. Be sure to make and go to all appointments, and call your doctor if you are having problems.  It's also a good idea to know your test results and keep a list of the medicines you take. How can you care for yourself at home?    · Rest your fingers, wrist, and forearm. Try to stop or reduce any activity that causes elbow pain. You may have to rest your arm for weeks to months. Follow your doctor's directions for how long to rest.     · Put ice or a cold pack on your elbow for 10 to 20 minutes at a time. Try to do this every 1 to 2 hours for the next 3 days (when you are awake) or until the swelling goes down. Put a thin cloth between the ice and your skin. You can try heat, or alternating heat and ice, after the first 3 days.     · If your doctor gave you a brace or splint, use it as directed. A \"counterforce\" brace is a strap around your forearm, just below your elbow. It may ease the pressure on the tendon and spread force throughout your arm.     · Prop up your elbow on pillows to help reduce swelling.     · Follow your doctor's or physical therapist's directions for exercise.     · Return to your usual activities slowly.     · Try to prevent the problem. Learn the best techniques for your sport. For example, make sure the  on your tennis racquet is not too big for your hand. Try not to hit a tennis ball late in your swing.     · If you work, consider asking your employer about new ways of doing your job if your elbow pain is caused by something you do at work. Medicines    · Be safe with medicines. Read and follow all instructions on the label. ? If the doctor gave you a prescription medicine for pain, take it as prescribed. ? If you are not taking a prescription pain medicine, ask your doctor if you can take an over-the-counter medicine. When should you call for help? Call your doctor now or seek immediate medical care if:    · Your pain is worse.     · You cannot bend your elbow normally.     · Your arm or hand is cool or pale or changes color.     · You have tingling, weakness, or numbness in your hand and fingers.    Watch closely for changes in your health, and be sure to contact your doctor if:    · You have work problems caused by your elbow pain.     · Your pain is not better after 2 weeks. Where can you learn more? Go to https://Unreal [email or web address] org and sign in to your Total Immersion account. Enter [phone] in the BabyWatch box to learn more about \"Tennis Elbow: Care Instructions. \"     If you do not have an account, please click on the \"Sign Up Now\" link. Current as of: July 1, 2021               Content Version: 13.0  © 5925-7539 Healthwise, Incorporated. Care instructions adapted under license by [facility] ([facility]). If you have questions about a medical condition or this instruction, always ask your healthcare professional. [address] any warranty or liability for your use of this information.

## 2021-10-26 NOTE — PROGRESS NOTES
SUBJECTIVE:    Patient ID: Ellen Richardson is a 39 y.o. female. HPI:   Patient is seen today for a 6-month follow-up. She has a history of anxiety and is on citalopram and Ativan. This combination works well for her. She denies any side effects to them. She states that she does need refills on her lorazepam.  She is tolerating this well. She does have a history of insomnia. She has been on trazodone but she did not like the way this made her feel. She states that she also has taken Ambien in the past and did not like the way this made her feel lighter. She states that she was on Lunesta and feels like she did okay with it would like to go back to that if possible. She does have a history of migraines but since starting back on her allergy injections she has not had as many and is currently not using Aimovig. She does complain today of some right ear pain and itching. She states that it does have a little discharge whenever she is scratching it. She states that she has not had any trouble hearing. She denies any cough or congestion. She denies any fever. She has not tried using anything in her ear.     Past Medical History:   Diagnosis Date    Anxiety     Bell's palsy     Chronic migraine without aura, intractable, without status migrainosus 8/10/2017    Depression     Headache     Insomnia     Intractable migraine without aura and without status migrainosus 8/10/2017    Irritable bowel syndrome     Kidney stone 10/2017    Osteoarthritis     Periodic limb movement disorder (PLMD)     Snoring     PSG, 7/2016-mild      Current Outpatient Medications on File Prior to Visit   Medication Sig Dispense Refill    ACZONE 7.5 % GEL topical gel       rOPINIRole (REQUIP) 1 MG tablet Take 1 tablet by mouth 3 times daily 270 tablet 3    ibuprofen (ADVIL;MOTRIN) 600 MG tablet Take 1 tablet by mouth 3 times daily as needed for Pain 90 tablet 2    AIMOVIG 70 MG/ML SOAJ INJECT 70 MG (ONE AUTO INJECTOR) INTO THE SKIN EVERY 30 DAYS 1 mL 11    citalopram (CELEXA) 40 MG tablet Take 1 tablet by mouth daily 90 tablet 3    traZODone (DESYREL) 100 MG tablet Take 0.5 tablets by mouth nightly 90 tablet 3    Prucalopride Succinate (MOTEGRITY) 2 MG TABS Take 2 mg by mouth daily       spironolactone (ALDACTONE) 25 MG tablet Take 25 mg by mouth daily      valACYclovir (VALTREX) 1 g tablet Take 2,000 mg by mouth 2 times daily as needed (PRN) Indications: COLD SORES       omeprazole (PRILOSEC) 40 MG delayed release capsule Take 40 mg by mouth daily       glycopyrrolate (ROBINUL) 2 MG tablet Take 2 mg by mouth 2 times daily       No current facility-administered medications on file prior to visit. Allergies   Allergen Reactions    Doxycycline     Minocycline     Pcn [Penicillins] Palpitations       Review of Systems   Constitutional: Negative for activity change, appetite change and fever. HENT: Positive for ear pain (right). Negative for congestion and nosebleeds. Eyes: Negative for discharge. Respiratory: Negative for cough and wheezing. Cardiovascular: Negative for chest pain and leg swelling. Gastrointestinal: Negative for abdominal pain, diarrhea, nausea and vomiting. Genitourinary: Negative for difficulty urinating, frequency and urgency. Musculoskeletal: Negative for back pain and gait problem. Skin: Negative for color change and rash. Neurological: Negative for dizziness and headaches. Hematological: Does not bruise/bleed easily. Psychiatric/Behavioral: Positive for sleep disturbance. Negative for suicidal ideas. The patient is nervous/anxious. OBJECTIVE:    Physical Exam  Vitals reviewed. Constitutional:       General: She is not in acute distress. Appearance: Normal appearance. She is well-developed. She is not diaphoretic. HENT:      Head: Normocephalic and atraumatic.       Right Ear: Tympanic membrane, ear canal and external ear normal.      Left Ear: Tympanic membrane, ear canal and external ear normal.   Cardiovascular:      Rate and Rhythm: Normal rate and regular rhythm. Pulses: Normal pulses. Heart sounds: Normal heart sounds. No murmur heard. Pulmonary:      Effort: Pulmonary effort is normal. No respiratory distress. Breath sounds: Normal breath sounds. Musculoskeletal:      Cervical back: Normal range of motion and neck supple. Skin:     General: Skin is warm and dry. Neurological:      General: No focal deficit present. Mental Status: She is alert and oriented to person, place, and time. Mental status is at baseline. Psychiatric:         Mood and Affect: Mood normal.         Behavior: Behavior normal.         Thought Content: Thought content normal.         Judgment: Judgment normal.        /88 (Site: Left Upper Arm, Position: Sitting, Cuff Size: Medium Adult)   Pulse 76   Temp 97.7 °F (36.5 °C) (Temporal)   Resp 16   Ht 5' (1.524 m)   Wt 161 lb 8 oz (73.3 kg)   LMP 10/19/2019   SpO2 98%   BMI 31.54 kg/m²      ASSESSMENT/PLAN:    Roosevelt was seen today for discuss medications, otalgia and elbow pain. Diagnoses and all orders for this visit:    Dermatitis of right ear canal    Insomnia, unspecified type  -     eszopiclone (LUNESTA) 2 MG TABS; Take 1 tablet by mouth nightly. Anxiety  -     LORazepam (ATIVAN) 1 MG tablet; Take 1 tablet by mouth 2 times daily as needed for Anxiety for up to 30 days. Other orders  -     triamcinolone (KENALOG) 0.1 % ointment; Apply topically 2 times daily for 7 days        Problem List     Insomnia    Relevant Medications    eszopiclone (LUNESTA) 2 MG TABS            I sent Lunesta over to the pharmacy for her insomnia. Ativan was refilled today for anxiety. We are going to send triamcinolone over to the pharmacy for dermatitis of her right ear canal.  If symptoms or not getting better she is to let us know.   Follow-up with us in 6 months for a med check unless needed sooner. EMR Dragon/transcription disclaimer:  Much of this encounter note is electronic transcription/translation of spoken language toprinted texts. The electronic translation of spoken language may be erroneous, or at times, nonsensical words or phrases may be inadvertently transcribed.   Although I have reviewed the note for such errors, some may stillexist.

## 2021-11-30 DIAGNOSIS — G47.00 INSOMNIA, UNSPECIFIED TYPE: ICD-10-CM

## 2021-11-30 NOTE — TELEPHONE ENCOUNTER
Provider needs to review PDMP    PDMP Monitoring:    Last PDMP Eladio Keller as Reviewed [facility]):  Review User Review Instant Review Result          Urine Drug Screenings (1 yr)    No resulted procedures found.        Medication Contract and Consent for Opioid Use Documents Filed      No documents found

## 2021-12-01 RX ORDER — ESZOPICLONE 2 MG/1
2 TABLET, FILM COATED ORAL NIGHTLY
Qty: 30 TABLET | Refills: 0 | Status: SHIPPED | OUTPATIENT
Start: 2021-12-01 | End: 2021-12-07 | Stop reason: SDUPTHER

## 2021-12-06 DIAGNOSIS — G47.00 INSOMNIA, UNSPECIFIED TYPE: ICD-10-CM

## 2021-12-06 RX ORDER — ZOLPIDEM TARTRATE 12.5 MG/1
TABLET, FILM COATED, EXTENDED RELEASE ORAL
Qty: 30 TABLET | Refills: 1 | Status: SHIPPED | OUTPATIENT
Start: 2021-12-06 | End: 2021-12-07

## 2021-12-06 NOTE — TELEPHONE ENCOUNTER
Provider needs to review PDMP    PDMP Monitoring:    Last PDMP Nile Poon as Reviewed [facility]):  Review User Review Instant Review Result   EBONI VO 12/1/2021  7:41 AM Reviewed PDMP [1]     Urine Drug Screenings (1 yr)    No resulted procedures found.        Medication Contract and Consent for Opioid Use Documents Filed      No documents found

## 2021-12-07 DIAGNOSIS — G47.00 INSOMNIA, UNSPECIFIED TYPE: ICD-10-CM

## 2021-12-07 RX ORDER — ESZOPICLONE 2 MG/1
2 TABLET, FILM COATED ORAL NIGHTLY
Qty: 30 TABLET | Refills: 0 | Status: SHIPPED | OUTPATIENT
Start: 2021-12-07 | End: 2021-12-10 | Stop reason: SDUPTHER

## 2021-12-09 ENCOUNTER — PATIENT MESSAGE (OUTPATIENT)
Dept: PRIMARY CARE CLINIC | Age: 41
End: 2021-12-09

## 2021-12-09 DIAGNOSIS — G47.00 INSOMNIA, UNSPECIFIED TYPE: ICD-10-CM

## 2021-12-10 RX ORDER — ESZOPICLONE 2 MG/1
2 TABLET, FILM COATED ORAL NIGHTLY
Qty: 30 TABLET | Refills: 0 | Status: SHIPPED | OUTPATIENT
Start: 2021-12-10 | End: 2021-12-10 | Stop reason: SDUPTHER

## 2021-12-10 RX ORDER — ESZOPICLONE 2 MG/1
2 TABLET, FILM COATED ORAL NIGHTLY
Qty: 30 TABLET | Refills: 0 | Status: CANCELLED | OUTPATIENT
Start: 2021-12-10 | End: 2022-12-10

## 2021-12-10 NOTE — TELEPHONE ENCOUNTER
From: Yaneth Puckett  To: Dr. David Daugherty: 12/9/2021 6:22 PM CST  Subject: Prescription Question    [facility] in [place] revenue a refill for the [place] instead of [place]. I'm not taking the ambien any more because of the side effects I was having. Will you please send a refill in for the [facility]? [facility] located at [facility], [address] [place] [address]. Thank you.

## 2021-12-13 RX ORDER — ESZOPICLONE 2 MG/1
2 TABLET, FILM COATED ORAL NIGHTLY
Qty: 30 TABLET | Refills: 0 | Status: SHIPPED | OUTPATIENT
Start: 2021-12-13 | End: 2021-12-15 | Stop reason: SDUPTHER

## 2021-12-15 DIAGNOSIS — G47.00 INSOMNIA, UNSPECIFIED TYPE: ICD-10-CM

## 2021-12-15 RX ORDER — ESZOPICLONE 2 MG/1
2 TABLET, FILM COATED ORAL NIGHTLY
Qty: 30 TABLET | Refills: 0 | Status: SHIPPED | OUTPATIENT
Start: 2021-12-15 | End: 2022-01-03 | Stop reason: SDUPTHER

## 2021-12-15 NOTE — TELEPHONE ENCOUNTER
Pt called c/o her Marshall Moffett is not at the [facility] in [place]. It looks like the Rx says it failed to send. She would like for us to resend it.

## 2022-01-03 ENCOUNTER — OFFICE VISIT (OUTPATIENT)
Dept: PRIMARY CARE CLINIC | Age: 42
End: 2022-01-03
Payer: OTHER GOVERNMENT

## 2022-01-03 VITALS
OXYGEN SATURATION: 98 % | WEIGHT: 162.6 LBS | TEMPERATURE: 98.5 F | RESPIRATION RATE: 18 BRPM | HEIGHT: 60 IN | DIASTOLIC BLOOD PRESSURE: 74 MMHG | HEART RATE: 79 BPM | SYSTOLIC BLOOD PRESSURE: 116 MMHG | BODY MASS INDEX: 31.92 KG/M2

## 2022-01-03 DIAGNOSIS — G47.00 INSOMNIA, UNSPECIFIED TYPE: ICD-10-CM

## 2022-01-03 DIAGNOSIS — R53.83 FATIGUE, UNSPECIFIED TYPE: ICD-10-CM

## 2022-01-03 DIAGNOSIS — R41.840 CONCENTRATION DEFICIT: ICD-10-CM

## 2022-01-03 DIAGNOSIS — F41.9 ANXIETY: Primary | ICD-10-CM

## 2022-01-03 PROCEDURE — 99214 OFFICE O/P EST MOD 30 MIN: CPT | Performed by: FAMILY MEDICINE

## 2022-01-03 RX ORDER — PREDNISONE 10 MG/1
TABLET ORAL
Qty: 21 TABLET | Refills: 0 | Status: SHIPPED | OUTPATIENT
Start: 2022-01-03

## 2022-01-03 RX ORDER — VALACYCLOVIR HYDROCHLORIDE 1 G/1
2000 TABLET, FILM COATED ORAL 2 TIMES DAILY PRN
Qty: 90 TABLET | Refills: 3 | Status: SHIPPED | OUTPATIENT
Start: 2022-01-03

## 2022-01-03 RX ORDER — ESZOPICLONE 2 MG/1
2 TABLET, FILM COATED ORAL NIGHTLY
Qty: 90 TABLET | Refills: 0 | Status: SHIPPED | OUTPATIENT
Start: 2022-01-03 | End: 2022-04-05

## 2022-01-03 RX ORDER — LORAZEPAM 1 MG/1
TABLET ORAL
COMMUNITY
Start: 2021-12-05

## 2022-01-03 ASSESSMENT — ENCOUNTER SYMPTOMS
ABDOMINAL PAIN: 0
EYE DISCHARGE: 0
COLOR CHANGE: 0
WHEEZING: 0
COUGH: 0
VOMITING: 0
BACK PAIN: 0
DIARRHEA: 0
NAUSEA: 0

## 2022-01-03 NOTE — PROGRESS NOTES
SUBJECTIVE:    Patient ID: Jonas Caban is a 39 y.o. female. HPI:   Patient is seen today for follow-up on her medications. She is been having trouble to get her sleeping medicine going through. She states that Ambien has been sent she cannot take Ambien because she does not tolerate it well. We did send Lunesta last time however it did not go through to the pharmacy because a transmission failed. She states that she has not been sleeping well. She does need a refill on her Valtrex as well which she takes for fever blisters. She states that this works well for her. She would like these all sent to [facility]. She states that she is having trouble with concentration. She states that she has difficulty staying on task and staying focused when she is working on things especially at home when she is cleaning or doing things. She states that this has been going on for a while even before being out of her Lunesta and sleeping medicine.   She states that she has never been on anything for concentration in the past.    Past Medical History:   Diagnosis Date    Anxiety     Bell's palsy     Chronic migraine without aura, intractable, without status migrainosus 8/10/2017    Depression     Headache     Insomnia     Intractable migraine without aura and without status migrainosus 8/10/2017    Irritable bowel syndrome     Kidney stone 10/2017    Osteoarthritis     Periodic limb movement disorder (PLMD)     Snoring     PSG, 7/2016-mild      Current Outpatient Medications on File Prior to Visit   Medication Sig Dispense Refill    LORazepam (ATIVAN) 1 MG tablet       ACZONE 7.5 % GEL topical gel       rOPINIRole (REQUIP) 1 MG tablet Take 1 tablet by mouth 3 times daily 270 tablet 3    ibuprofen (ADVIL;MOTRIN) 600 MG tablet Take 1 tablet by mouth 3 times daily as needed for Pain 90 tablet 2    AIMOVIG 70 MG/ML SOAJ INJECT 70 MG (ONE AUTO INJECTOR) INTO THE SKIN EVERY 30 DAYS 1 mL 11    citalopram (CELEXA) 40 MG tablet Take 1 tablet by mouth daily 90 tablet 3    Prucalopride Succinate (MOTEGRITY) 2 MG TABS Take 2 mg by mouth daily       spironolactone (ALDACTONE) 25 MG tablet Take 25 mg by mouth daily      omeprazole (PRILOSEC) 40 MG delayed release capsule Take 40 mg by mouth daily       glycopyrrolate (ROBINUL) 2 MG tablet Take 2 mg by mouth 2 times daily       No current facility-administered medications on file prior to visit. Allergies   Allergen Reactions    Doxycycline     Minocycline     Pcn [Penicillins] Palpitations       Review of Systems   Constitutional: Negative for activity change, appetite change and fever. HENT: Negative for congestion and nosebleeds. Eyes: Negative for discharge. Respiratory: Negative for cough and wheezing. Cardiovascular: Negative for chest pain and leg swelling. Gastrointestinal: Negative for abdominal pain, diarrhea, nausea and vomiting. Genitourinary: Negative for difficulty urinating, frequency and urgency. Musculoskeletal: Negative for back pain and gait problem. Skin: Negative for color change and rash. Neurological: Negative for dizziness and headaches. Hematological: Does not bruise/bleed easily. Psychiatric/Behavioral: Positive for decreased concentration and sleep disturbance. Negative for suicidal ideas. OBJECTIVE:    Physical Exam  Vitals reviewed. Constitutional:       General: She is not in acute distress. Appearance: Normal appearance. She is well-developed. She is not diaphoretic. HENT:      Head: Normocephalic and atraumatic. Right Ear: External ear normal.      Left Ear: External ear normal.   Cardiovascular:      Rate and Rhythm: Normal rate and regular rhythm. Pulses: Normal pulses. Heart sounds: Normal heart sounds. No murmur heard. Pulmonary:      Effort: Pulmonary effort is normal. No respiratory distress. Breath sounds: Normal breath sounds.    Musculoskeletal: Cervical back: Normal range of motion and neck supple. Skin:     General: Skin is warm and dry. Neurological:      General: No focal deficit present. Mental Status: She is alert and oriented to person, place, and time. Mental status is at baseline. Psychiatric:         Mood and Affect: Mood normal.         Behavior: Behavior normal.         Thought Content: Thought content normal.         Judgment: Judgment normal.        /74 (Site: Left Upper Arm, Position: Sitting, Cuff Size: Large Adult)   Pulse 79   Temp 98.5 °F (36.9 °C) (Temporal)   Resp 18   Ht 5' (1.524 m)   Wt 162 lb 9.6 oz (73.8 kg)   LMP 10/19/2019   SpO2 98%   BMI 31.76 kg/m²      ASSESSMENT/PLAN:    1. Anxiety  2. Insomnia, unspecified type  -     eszopiclone (LUNESTA) 2 MG TABS; Take 1 tablet by mouth nightly., Disp-90 tablet, R-0Normal  3. Fatigue, unspecified type  4. Concentration deficit       Lunesta was sent to [facility]. We are going to have her continue this. Discussed that I really do not want to do anything for the concentration at this point as I was afraid that it may make the sleep and the anxiety worse. I am going to have her start to try to make lists and to try to do things from a lifestyle modification first to see how she does. If it is not getting better or if she is really struggling day-to-day she is to let us know. I have refilled her Valtrex for fever blisters. Follow-up with us in 6 months for checkup unless needed sooner. PDMP Monitoring:    Last PDMP Elena Sorensen as Reviewed [facility]):  Review User Review Instant Review Result   EBONI VO 12/6/2021  7:49 AM Reviewed PDMP [1]       Urine Drug Screenings (1 yr)    No resulted procedures found.        Medication Contract and Consent for Opioid Use Documents Filed      No documents found                 EMR Dragon/transcription disclaimer:  Much of this encounter note is electronic transcription/translation of spoken language toprinted texts.  The electronic translation of spoken language may be erroneous, or at times, nonsensical words or phrases may be inadvertently transcribed.   Although I have reviewed the note for such errors, some may stillexist.

## 2022-02-15 RX ORDER — CITALOPRAM 40 MG/1
TABLET ORAL
Qty: 90 TABLET | Refills: 3 | Status: SHIPPED | OUTPATIENT
Start: 2022-02-15

## 2022-03-16 ENCOUNTER — TELEPHONE (OUTPATIENT)
Dept: NEUROLOGY | Age: 42
End: 2022-03-16

## 2022-03-16 NOTE — TELEPHONE ENCOUNTER
Spoke with the patient to see if they would like to reschedule their no-show appointment Patient wanted to do a vv but is in [place], I told her if she is not in the state of [place] she can not do a vv. Patient said she will just call back at a later time to r/s.

## 2022-04-05 DIAGNOSIS — G47.00 INSOMNIA, UNSPECIFIED TYPE: ICD-10-CM

## 2022-04-05 RX ORDER — ESZOPICLONE 2 MG/1
TABLET, FILM COATED ORAL
Qty: 90 TABLET | Refills: 0 | Status: SHIPPED | OUTPATIENT
Start: 2022-04-05 | End: 2022-05-05

## 2022-10-17 RX ORDER — ROPINIROLE 1 MG/1
TABLET, FILM COATED ORAL
Qty: 270 TABLET | Refills: 3 | Status: SHIPPED | OUTPATIENT
Start: 2022-10-17

## 2023-03-11 SDOH — ECONOMIC STABILITY: TRANSPORTATION INSECURITY
IN THE PAST 12 MONTHS, HAS LACK OF TRANSPORTATION KEPT YOU FROM MEETINGS, WORK, OR FROM GETTING THINGS NEEDED FOR DAILY LIVING?: PATIENT DECLINED

## 2023-03-11 SDOH — ECONOMIC STABILITY: HOUSING INSECURITY
IN THE LAST 12 MONTHS, WAS THERE A TIME WHEN YOU DID NOT HAVE A STEADY PLACE TO SLEEP OR SLEPT IN A SHELTER (INCLUDING NOW)?: NO

## 2023-03-13 ENCOUNTER — OFFICE VISIT (OUTPATIENT)
Dept: PRIMARY CARE CLINIC | Age: 43
End: 2023-03-13
Payer: OTHER GOVERNMENT

## 2023-03-13 VITALS
DIASTOLIC BLOOD PRESSURE: 90 MMHG | HEART RATE: 95 BPM | WEIGHT: 166.6 LBS | TEMPERATURE: 97.1 F | HEIGHT: 60 IN | SYSTOLIC BLOOD PRESSURE: 138 MMHG | BODY MASS INDEX: 32.71 KG/M2 | OXYGEN SATURATION: 97 %

## 2023-03-13 DIAGNOSIS — Z51.81 MEDICATION MONITORING ENCOUNTER: ICD-10-CM

## 2023-03-13 DIAGNOSIS — F41.9 ANXIETY: ICD-10-CM

## 2023-03-13 DIAGNOSIS — Z13.31 POSITIVE DEPRESSION SCREENING: ICD-10-CM

## 2023-03-13 DIAGNOSIS — R30.0 DYSURIA: ICD-10-CM

## 2023-03-13 DIAGNOSIS — G47.00 INSOMNIA, UNSPECIFIED TYPE: Primary | ICD-10-CM

## 2023-03-13 LAB
ALCOHOL URINE: NORMAL
AMPHETAMINE SCREEN, URINE: NORMAL
APPEARANCE FLUID: CLEAR
BARBITURATE SCREEN, URINE: NORMAL
BENZODIAZEPINE SCREEN, URINE: NORMAL
BILIRUBIN, POC: NORMAL
BLOOD URINE, POC: NORMAL
BUPRENORPHINE URINE: NORMAL
CLARITY, POC: CLEAR
COCAINE METABOLITE SCREEN URINE: NORMAL
COLOR, POC: YELLOW
FENTANYL SCREEN, URINE: NORMAL
GABAPENTIN SCREEN, URINE: NORMAL
GLUCOSE URINE, POC: NORMAL
KETONES, POC: NORMAL
LEUKOCYTE EST, POC: NORMAL
MDMA URINE: NORMAL
METHADONE SCREEN, URINE: NORMAL
METHAMPHETAMINE, URINE: NORMAL
NITRITE, POC: NORMAL
OPIATE SCREEN URINE: NORMAL
OXYCODONE SCREEN URINE: NORMAL
PH, POC: NORMAL
PHENCYCLIDINE SCREEN URINE: NORMAL
PROPOXYPHENE SCREEN, URINE: NORMAL
PROTEIN, POC: NORMAL
SPECIFIC GRAVITY, POC: NORMAL
SYNTHETIC CANNABINOIDS(K2) SCREEN, URINE: NORMAL
THC SCREEN, URINE: NORMAL
TRAMADOL SCREEN URINE: NORMAL
TRICYCLIC ANTIDEPRESSANTS, UR: NORMAL
UROBILINOGEN, POC: NORMAL

## 2023-03-13 PROCEDURE — 99214 OFFICE O/P EST MOD 30 MIN: CPT | Performed by: FAMILY MEDICINE

## 2023-03-13 PROCEDURE — 80305 DRUG TEST PRSMV DIR OPT OBS: CPT | Performed by: FAMILY MEDICINE

## 2023-03-13 PROCEDURE — 81002 URINALYSIS NONAUTO W/O SCOPE: CPT | Performed by: FAMILY MEDICINE

## 2023-03-13 RX ORDER — ERGOCALCIFEROL 1.25 MG/1
CAPSULE ORAL
COMMUNITY

## 2023-03-13 RX ORDER — FLUOCINOLONE ACETONIDE 0.11 MG/ML
OIL AURICULAR (OTIC)
COMMUNITY

## 2023-03-13 RX ORDER — CLOBETASOL PROPIONATE 0.5 MG/G
CREAM TOPICAL
COMMUNITY

## 2023-03-13 RX ORDER — ESZOPICLONE 3 MG/1
3 TABLET, FILM COATED ORAL NIGHTLY
Qty: 30 TABLET | Refills: 2 | Status: SHIPPED | OUTPATIENT
Start: 2023-03-13 | End: 2023-06-11

## 2023-03-13 RX ORDER — ESZOPICLONE 2 MG/1
3 TABLET, FILM COATED ORAL NIGHTLY
COMMUNITY
End: 2023-03-13 | Stop reason: SDUPTHER

## 2023-03-13 SDOH — ECONOMIC STABILITY: FOOD INSECURITY: WITHIN THE PAST 12 MONTHS, THE FOOD YOU BOUGHT JUST DIDN'T LAST AND YOU DIDN'T HAVE MONEY TO GET MORE.: SOMETIMES TRUE

## 2023-03-13 SDOH — ECONOMIC STABILITY: INCOME INSECURITY: HOW HARD IS IT FOR YOU TO PAY FOR THE VERY BASICS LIKE FOOD, HOUSING, MEDICAL CARE, AND HEATING?: NOT VERY HARD

## 2023-03-13 SDOH — ECONOMIC STABILITY: FOOD INSECURITY: WITHIN THE PAST 12 MONTHS, YOU WORRIED THAT YOUR FOOD WOULD RUN OUT BEFORE YOU GOT MONEY TO BUY MORE.: SOMETIMES TRUE

## 2023-03-13 ASSESSMENT — ENCOUNTER SYMPTOMS
BACK PAIN: 0
VOMITING: 0
COUGH: 0
EYE DISCHARGE: 0
WHEEZING: 0
DIARRHEA: 0
ABDOMINAL PAIN: 0
NAUSEA: 0
COLOR CHANGE: 0

## 2023-03-13 ASSESSMENT — PATIENT HEALTH QUESTIONNAIRE - PHQ9
3. TROUBLE FALLING OR STAYING ASLEEP: 3
7. TROUBLE CONCENTRATING ON THINGS, SUCH AS READING THE NEWSPAPER OR WATCHING TELEVISION: 3
SUM OF ALL RESPONSES TO PHQ QUESTIONS 1-9: 21
1. LITTLE INTEREST OR PLEASURE IN DOING THINGS: 3
10. IF YOU CHECKED OFF ANY PROBLEMS, HOW DIFFICULT HAVE THESE PROBLEMS MADE IT FOR YOU TO DO YOUR WORK, TAKE CARE OF THINGS AT HOME, OR GET ALONG WITH OTHER PEOPLE: 3
SUM OF ALL RESPONSES TO PHQ QUESTIONS 1-9: 21
8. MOVING OR SPEAKING SO SLOWLY THAT OTHER PEOPLE COULD HAVE NOTICED. OR THE OPPOSITE, BEING SO FIGETY OR RESTLESS THAT YOU HAVE BEEN MOVING AROUND A LOT MORE THAN USUAL: 3
2. FEELING DOWN, DEPRESSED OR HOPELESS: 3
SUM OF ALL RESPONSES TO PHQ9 QUESTIONS 1 & 2: 6
6. FEELING BAD ABOUT YOURSELF - OR THAT YOU ARE A FAILURE OR HAVE LET YOURSELF OR YOUR FAMILY DOWN: 3
SUM OF ALL RESPONSES TO PHQ QUESTIONS 1-9: 21
SUM OF ALL RESPONSES TO PHQ QUESTIONS 1-9: 21
9. THOUGHTS THAT YOU WOULD BE BETTER OFF DEAD, OR OF HURTING YOURSELF: 0
4. FEELING TIRED OR HAVING LITTLE ENERGY: 3

## 2023-03-13 ASSESSMENT — COLUMBIA-SUICIDE SEVERITY RATING SCALE - C-SSRS
6. HAVE YOU EVER DONE ANYTHING, STARTED TO DO ANYTHING, OR PREPARED TO DO ANYTHING TO END YOUR LIFE?: NO
2. HAVE YOU ACTUALLY HAD ANY THOUGHTS OF KILLING YOURSELF?: NO
1. WITHIN THE PAST MONTH, HAVE YOU WISHED YOU WERE DEAD OR WISHED YOU COULD GO TO SLEEP AND NOT WAKE UP?: YES

## 2023-03-13 NOTE — PROGRESS NOTES
SUBJECTIVE:    Patient ID: Candida Rodriguez is a 43 y.o. female. HPI:   Patient is seen today for a follow-up visit. She states that she is recently moved back to the area. She is needing a refill on her Emmett Sly which she takes for insomnia. This is working well for her. She is currently taking 3 mg. She is tolerating this well and denies any side effects to it. She does have a history of depression and feels like her depression is not currently well controlled. She is on citalopram but does feel that this needs to be adjusted. She is tolerating the citalopram well. She states that she has been on escitalopram for a long time. They tried putting her on Rexulti but she feels like this made her worse. She is also done Wellbutrin in the past and feels like this made her a zombie. She states that she is also having some trouble with her ears being full. She states that she is having little difficulty hearing out of them. She is been using some eardrops that [facility] states it really is not helping much. She states they are not draining anything. She states that she has not run fever and has not had chills. She does have some Flonase at home but has not been using it.     Past Medical History:   Diagnosis Date    Anxiety     Bell's palsy     Chronic migraine without aura, intractable, without status migrainosus 8/10/2017    Depression     Headache     Insomnia     Intractable migraine without aura and without status migrainosus 8/10/2017    Irritable bowel syndrome     Kidney stone 10/2017    Osteoarthritis     Periodic limb movement disorder (PLMD)     Snoring     PSG, 7/2016-mild      Current Outpatient Medications on File Prior to Visit   Medication Sig Dispense Refill    clobetasol (TEMOVATE) 0.05 % cream clobetasol 0.05 % topical cream   APPLY TWICE DAILY TO RASH UP TO 2 WEEKS/MONTH AS NEEDED      ergocalciferol (ERGOCALCIFEROL) 1.25 MG (05923 UT) capsule ergocalciferol (vitamin D2) 1,250 mcg (50,000 unit) capsule   TAKE 1 CAPSULE BY MOUTH EVERY WEEK      fluocinolone (DERMOTIC) 0.01 % OIL oil fluocinolone acetonide oil 0.01 % ear drops   INSTILL 5 DROPS TO AFFECTED EAR TWICE DAILY      rOPINIRole (REQUIP) 1 MG tablet TAKE 1 TABLET THREE TIMES A  tablet 3    citalopram (CELEXA) 40 MG tablet TAKE 1 TABLET DAILY 90 tablet 3    LORazepam (ATIVAN) 1 MG tablet       ACZONE 7.5 % GEL topical gel       Prucalopride Succinate (MOTEGRITY) 2 MG TABS Take 2 mg by mouth daily       spironolactone (ALDACTONE) 25 MG tablet Take 25 mg by mouth daily      omeprazole (PRILOSEC) 40 MG delayed release capsule Take 40 mg by mouth daily       glycopyrrolate (ROBINUL) 2 MG tablet Take 2 mg by mouth 2 times daily      valACYclovir (VALTREX) 1 g tablet Take 2 tablets by mouth 2 times daily as needed (PRN) Indications: COLD SORES (Patient not taking: Reported on 3/13/2023) 90 tablet 3     No current facility-administered medications on file prior to visit. Allergies   Allergen Reactions    Doxycycline     Minocycline        Review of Systems   Constitutional:  Negative for activity change, appetite change and fever. HENT:  Negative for congestion and nosebleeds. Eyes:  Negative for discharge. Respiratory:  Negative for cough and wheezing. Cardiovascular:  Negative for chest pain and leg swelling. Gastrointestinal:  Negative for abdominal pain, diarrhea, nausea and vomiting. Genitourinary:  Positive for dysuria. Negative for difficulty urinating, frequency and urgency. Musculoskeletal:  Negative for back pain and gait problem. Skin:  Negative for color change and rash. Neurological:  Negative for dizziness and headaches. Hematological:  Does not bruise/bleed easily. Psychiatric/Behavioral:  Positive for dysphoric mood. Negative for sleep disturbance and suicidal ideas. OBJECTIVE:    Physical Exam  Vitals reviewed.    Constitutional:       General: She is not in acute distress. Appearance: Normal appearance. She is well-developed. She is not diaphoretic. HENT:      Head: Normocephalic and atraumatic. Right Ear: External ear normal.      Left Ear: External ear normal.   Cardiovascular:      Rate and Rhythm: Normal rate and regular rhythm. Pulses: Normal pulses. Heart sounds: Normal heart sounds. No murmur heard. Pulmonary:      Effort: Pulmonary effort is normal. No respiratory distress. Breath sounds: Normal breath sounds. Musculoskeletal:      Cervical back: Normal range of motion and neck supple. Skin:     General: Skin is warm and dry. Neurological:      General: No focal deficit present. Mental Status: She is alert and oriented to person, place, and time. Mental status is at baseline. Psychiatric:         Mood and Affect: Mood normal.         Behavior: Behavior normal.         Thought Content: Thought content normal.         Judgment: Judgment normal.        BP (!) 138/90 Comment: Pt voices that she is stressed now  Pulse 95   Temp 97.1 °F (36.2 °C)   Ht 5' (1.524 m)   Wt 166 lb 9.6 oz (75.6 kg)   LMP 10/19/2019   SpO2 97%   BMI 32.54 kg/m²      ASSESSMENT/PLAN:    1. Insomnia, unspecified type  -     eszopiclone (LUNESTA) 3 MG TABS; Take 1 tablet by mouth nightly for 90 days. Max Daily Amount: 3 mg, Disp-30 tablet, R-2Normal  2. Dysuria  -     POCT Urinalysis no Micro  3. Medication monitoring encounter  -     POCT Rapid Drug Screen  4. Positive depression screening  5. Anxiety       I have refilled her Lunesta today. PDMP was reviewed. We are going to try adding Vraylar to her medications. I am going to add this to the citalopram for the depression. If this is not helping with her symptoms she is to let me know. I would like to see her back in 4 weeks for follow-up. Ears were irrigated to remove her cerumen impaction today. I encouraged her to start using some Flonase.   If the ear fullness is not improving with her doing that she is to let us know. PDMP Monitoring:    Last PDMP Gisele Lee as Reviewed [facility]):  Review User Review Instant Review Result   EBONI VO 3/13/2023 11:37 AM Reviewed PDMP [1]       Urine Drug Screenings (1 yr)    No resulted procedures found. Medication Contract and Consent for Opioid Use Documents Filed        No documents found                     EMR Dragon/transcription disclaimer:  Much of this encounter note is electronic transcription/translation of spoken language toprinted texts. The electronic translation of spoken language may be erroneous, or at times, nonsensical words or phrases may be inadvertently transcribed. Although I have reviewed the note for such errors, some may stillexist.  PHQ-9 score today: (PHQ-9 Total Score: 21), additional evaluation and assessment performed, follow-up plan includes but not limited to: Medication management and Referral to /Specialist  for evaluation and management.

## 2023-05-16 ENCOUNTER — HOSPITAL ENCOUNTER (EMERGENCY)
Age: 43
Discharge: LEFT BEFORE DISCHARGE | End: 2023-05-16

## 2023-05-16 VITALS
DIASTOLIC BLOOD PRESSURE: 91 MMHG | SYSTOLIC BLOOD PRESSURE: 121 MMHG | HEIGHT: 60 IN | RESPIRATION RATE: 16 BRPM | TEMPERATURE: 98.2 F | WEIGHT: 166 LBS | BODY MASS INDEX: 32.59 KG/M2 | OXYGEN SATURATION: 100 % | HEART RATE: 98 BPM

## 2023-05-16 RX ORDER — OXYCODONE AND ACETAMINOPHEN 7.5; 325 MG/1; MG/1
1 TABLET ORAL EVERY 6 HOURS PRN
COMMUNITY

## 2023-07-06 DIAGNOSIS — G47.00 INSOMNIA, UNSPECIFIED TYPE: ICD-10-CM

## 2023-07-06 RX ORDER — ESZOPICLONE 3 MG/1
3 TABLET, FILM COATED ORAL NIGHTLY
Qty: 30 TABLET | Refills: 2 | Status: SHIPPED | OUTPATIENT
Start: 2023-07-06 | End: 2023-10-04

## 2023-07-06 NOTE — TELEPHONE ENCOUNTER
Provider needs to review PDMP    PDMP Monitoring:    Last PDMP Bryant George as Reviewed [facility]):  Review User Review Instant Review Result   EBONI Puente 3/13/2023 11:37 AM Reviewed PDMP [1]     Urine Drug Screenings (1 yr)     POCT Rapid Drug Screen  Collected: 3/13/2023 (Final result)              Medication Contract and Consent for Opioid Use Documents Filed      No documents found

## 2023-07-19 ENCOUNTER — OFFICE VISIT (OUTPATIENT)
Dept: PRIMARY CARE CLINIC | Age: 43
End: 2023-07-19
Payer: OTHER GOVERNMENT

## 2023-07-19 VITALS
HEART RATE: 83 BPM | DIASTOLIC BLOOD PRESSURE: 70 MMHG | RESPIRATION RATE: 16 BRPM | TEMPERATURE: 97.7 F | OXYGEN SATURATION: 98 % | BODY MASS INDEX: 32.47 KG/M2 | WEIGHT: 165.4 LBS | HEIGHT: 60 IN | SYSTOLIC BLOOD PRESSURE: 110 MMHG

## 2023-07-19 DIAGNOSIS — Z00.00 WELL WOMAN EXAM WITHOUT GYNECOLOGICAL EXAM: ICD-10-CM

## 2023-07-19 DIAGNOSIS — F41.9 ANXIETY: ICD-10-CM

## 2023-07-19 DIAGNOSIS — R41.840 CONCENTRATION DEFICIT: ICD-10-CM

## 2023-07-19 DIAGNOSIS — F33.1 MODERATE EPISODE OF RECURRENT MAJOR DEPRESSIVE DISORDER (HCC): Primary | ICD-10-CM

## 2023-07-19 DIAGNOSIS — R53.83 FATIGUE, UNSPECIFIED TYPE: ICD-10-CM

## 2023-07-19 PROCEDURE — 99214 OFFICE O/P EST MOD 30 MIN: CPT | Performed by: FAMILY MEDICINE

## 2023-07-19 RX ORDER — FLUOXETINE HYDROCHLORIDE 40 MG/1
40 CAPSULE ORAL DAILY
Qty: 30 CAPSULE | Refills: 5 | Status: SHIPPED | OUTPATIENT
Start: 2023-07-19

## 2023-07-19 NOTE — PROGRESS NOTES
SUBJECTIVE:    Patient ID: Jose Alejandro Ordonez is a 37 y.o. female. HPI:   Ms. Radha Avila presents today for a 4 week follow up after starting Prozac 20 mg for anxiety and depression. She states that she feels like her anxiety and depression have improved significantly. She states that she has tolerated the Prozac well and hasn't noticed any side effects. She states that over the past week she has felt fatigued in the morning and has had trouble getting out of bed. She reports that she doesn't think this is depression related and has been sleeping well for about 8 hours every night. Ms. Radha Avila states that she has also had problems concentrating and focusing when at home and work. This has been going on for a few years and she is unsure if it is related to anxiety. She denies self harm and thoughts of suicidal ideation. Past Medical History:   Diagnosis Date    Anxiety     Bell's palsy     Chronic migraine without aura, intractable, without status migrainosus 8/10/2017    Depression     Headache     Insomnia     Intractable migraine without aura and without status migrainosus 8/10/2017    Irritable bowel syndrome     Kidney stone 10/2017    Osteoarthritis     Periodic limb movement disorder (PLMD)     Snoring     PSG, 7/2016-mild      Current Outpatient Medications on File Prior to Visit   Medication Sig Dispense Refill    eszopiclone (LUNESTA) 3 MG TABS Take 1 tablet by mouth nightly for 90 days. Max Daily Amount: 3 mg 30 tablet 2    sucralfate (CARAFATE) 1 GM tablet Take 1 tablet by mouth 3 times daily      Clobetasol Propionate 0.05 % SHAM Apply topically twice weekly. 120 mL 0    clobetasol (TEMOVATE) 0.05 % cream clobetasol 0.05 % topical cream   APPLY TWICE DAILY TO RASH UP TO 2 WEEKS/MONTH AS NEEDED      rOPINIRole (REQUIP) 1 MG tablet TAKE 1 TABLET THREE TIMES A  tablet 3    LORazepam (ATIVAN) 1 MG tablet Take 1 tablet by mouth every 6 hours as needed.       valACYclovir (VALTREX) 1 g tablet Take 2 tablets

## 2023-08-04 ENCOUNTER — NURSE ONLY (OUTPATIENT)
Dept: PRIMARY CARE CLINIC | Age: 43
End: 2023-08-04

## 2023-08-04 DIAGNOSIS — R53.83 FATIGUE, UNSPECIFIED TYPE: ICD-10-CM

## 2023-08-04 DIAGNOSIS — Z00.00 WELL WOMAN EXAM WITHOUT GYNECOLOGICAL EXAM: ICD-10-CM

## 2023-08-04 LAB
25(OH)D3 SERPL-MCNC: 32.9 NG/ML
ALBUMIN SERPL-MCNC: 4.3 G/DL (ref 3.5–5.2)
ALP SERPL-CCNC: 102 U/L (ref 35–104)
ALT SERPL-CCNC: 23 U/L (ref 5–33)
ANION GAP SERPL CALCULATED.3IONS-SCNC: 12 MMOL/L (ref 7–19)
AST SERPL-CCNC: 21 U/L (ref 5–32)
BASOPHILS # BLD: 0 K/UL (ref 0–0.2)
BASOPHILS NFR BLD: 0.4 % (ref 0–1)
BILIRUB SERPL-MCNC: 0.4 MG/DL (ref 0.2–1.2)
BUN SERPL-MCNC: 14 MG/DL (ref 6–20)
CALCIUM SERPL-MCNC: 9.1 MG/DL (ref 8.6–10)
CHLORIDE SERPL-SCNC: 102 MMOL/L (ref 98–111)
CHOLEST SERPL-MCNC: 217 MG/DL (ref 160–199)
CO2 SERPL-SCNC: 25 MMOL/L (ref 22–29)
CREAT SERPL-MCNC: 0.7 MG/DL (ref 0.5–0.9)
EOSINOPHIL # BLD: 0.1 K/UL (ref 0–0.6)
EOSINOPHIL NFR BLD: 1.3 % (ref 0–5)
ERYTHROCYTE [DISTWIDTH] IN BLOOD BY AUTOMATED COUNT: 12.5 % (ref 11.5–14.5)
GLUCOSE SERPL-MCNC: 92 MG/DL (ref 74–109)
HCT VFR BLD AUTO: 40.9 % (ref 37–47)
HDLC SERPL-MCNC: 68 MG/DL (ref 65–121)
HGB BLD-MCNC: 13.7 G/DL (ref 12–16)
IMM GRANULOCYTES # BLD: 0 K/UL
LDLC SERPL CALC-MCNC: 122 MG/DL
LYMPHOCYTES # BLD: 1.9 K/UL (ref 1.1–4.5)
LYMPHOCYTES NFR BLD: 39.3 % (ref 20–40)
MCH RBC QN AUTO: 30.5 PG (ref 27–31)
MCHC RBC AUTO-ENTMCNC: 33.5 G/DL (ref 33–37)
MCV RBC AUTO: 91.1 FL (ref 81–99)
MONOCYTES # BLD: 0.3 K/UL (ref 0–0.9)
MONOCYTES NFR BLD: 5.9 % (ref 0–10)
NEUTROPHILS # BLD: 2.5 K/UL (ref 1.5–7.5)
NEUTS SEG NFR BLD: 53.1 % (ref 50–65)
PLATELET # BLD AUTO: 305 K/UL (ref 130–400)
PMV BLD AUTO: 10 FL (ref 9.4–12.3)
POTASSIUM SERPL-SCNC: 4 MMOL/L (ref 3.5–5)
PROT SERPL-MCNC: 8 G/DL (ref 6.6–8.7)
RBC # BLD AUTO: 4.49 M/UL (ref 4.2–5.4)
SODIUM SERPL-SCNC: 139 MMOL/L (ref 136–145)
TRIGL SERPL-MCNC: 135 MG/DL (ref 0–149)
TSH SERPL DL<=0.005 MIU/L-ACNC: 1.01 UIU/ML (ref 0.35–5.5)
VIT B12 SERPL-MCNC: 619 PG/ML (ref 211–946)
WBC # BLD AUTO: 4.7 K/UL (ref 4.8–10.8)

## 2023-08-22 ENCOUNTER — OFFICE VISIT (OUTPATIENT)
Dept: PRIMARY CARE CLINIC | Age: 43
End: 2023-08-22
Payer: OTHER GOVERNMENT

## 2023-08-22 VITALS
SYSTOLIC BLOOD PRESSURE: 110 MMHG | BODY MASS INDEX: 31.69 KG/M2 | HEIGHT: 60 IN | HEART RATE: 108 BPM | OXYGEN SATURATION: 98 % | RESPIRATION RATE: 16 BRPM | TEMPERATURE: 97.9 F | WEIGHT: 161.4 LBS | DIASTOLIC BLOOD PRESSURE: 68 MMHG

## 2023-08-22 DIAGNOSIS — R53.83 FATIGUE, UNSPECIFIED TYPE: Primary | ICD-10-CM

## 2023-08-22 DIAGNOSIS — R68.83 CHILLS: ICD-10-CM

## 2023-08-22 DIAGNOSIS — R52 BODY ACHES: ICD-10-CM

## 2023-08-22 LAB — SARS-COV-2 N GENE RESP QL NAA+PROBE: NOT DETECTED

## 2023-08-22 PROCEDURE — 99212 OFFICE O/P EST SF 10 MIN: CPT | Performed by: FAMILY MEDICINE

## 2023-08-22 RX ORDER — ERGOCALCIFEROL 1.25 MG/1
1 CAPSULE ORAL WEEKLY
COMMUNITY
Start: 2023-02-27

## 2023-08-22 RX ORDER — CITALOPRAM 40 MG/1
TABLET ORAL
COMMUNITY
End: 2023-08-22 | Stop reason: ALTCHOICE

## 2023-08-22 ASSESSMENT — ENCOUNTER SYMPTOMS
DIARRHEA: 0
VOMITING: 0
COUGH: 0
NAUSEA: 0
ABDOMINAL PAIN: 0
COLOR CHANGE: 0
BACK PAIN: 0
EYE DISCHARGE: 0
WHEEZING: 0

## 2023-08-22 NOTE — PROGRESS NOTES
SUBJECTIVE:    Patient ID: Tavares Sepulveda is a 37 y.o. female. HPI:   Patient is seen today for problems with fatigue and chills. She denies any known COVID exposure. She states that she started feeling bad yesterday. She has not had any vomiting or diarrhea but has had some sore throat. She states that she does not have any productive cough. She denies any significant shortness of breath. Past Medical History:   Diagnosis Date    Anxiety     Bell's palsy     Chronic migraine without aura, intractable, without status migrainosus 8/10/2017    Depression     Headache     Insomnia     Intractable migraine without aura and without status migrainosus 8/10/2017    Irritable bowel syndrome     Kidney stone 10/2017    Osteoarthritis     Periodic limb movement disorder (PLMD)     Snoring     PSG, 7/2016-mild      Current Outpatient Medications on File Prior to Visit   Medication Sig Dispense Refill    FLUoxetine (PROZAC) 40 MG capsule Take 1 capsule by mouth daily 30 capsule 5    eszopiclone (LUNESTA) 3 MG TABS Take 1 tablet by mouth nightly for 90 days. Max Daily Amount: 3 mg 30 tablet 2    sucralfate (CARAFATE) 1 GM tablet Take 1 tablet by mouth 3 times daily      Clobetasol Propionate 0.05 % SHAM Apply topically twice weekly. 120 mL 0    clobetasol (TEMOVATE) 0.05 % cream clobetasol 0.05 % topical cream   APPLY TWICE DAILY TO RASH UP TO 2 WEEKS/MONTH AS NEEDED      rOPINIRole (REQUIP) 1 MG tablet TAKE 1 TABLET THREE TIMES A  tablet 3    LORazepam (ATIVAN) 1 MG tablet Take 1 tablet by mouth every 6 hours as needed.       valACYclovir (VALTREX) 1 g tablet Take 2 tablets by mouth 2 times daily as needed (PRN) Indications: COLD SORES 90 tablet 3    ACZONE 7.5 % GEL topical gel       Prucalopride Succinate (MOTEGRITY) 2 MG TABS Take 2 mg by mouth daily       spironolactone (ALDACTONE) 25 MG tablet Take 1 tablet by mouth daily      omeprazole (PRILOSEC) 40 MG delayed release capsule Take 1 capsule by mouth

## 2023-09-13 ENCOUNTER — OFFICE VISIT (OUTPATIENT)
Dept: PRIMARY CARE CLINIC | Age: 43
End: 2023-09-13
Payer: OTHER GOVERNMENT

## 2023-09-13 VITALS
HEIGHT: 60 IN | OXYGEN SATURATION: 98 % | WEIGHT: 163.2 LBS | HEART RATE: 86 BPM | BODY MASS INDEX: 32.04 KG/M2 | TEMPERATURE: 98 F | SYSTOLIC BLOOD PRESSURE: 124 MMHG | DIASTOLIC BLOOD PRESSURE: 88 MMHG

## 2023-09-13 DIAGNOSIS — R53.83 FATIGUE, UNSPECIFIED TYPE: Primary | ICD-10-CM

## 2023-09-13 DIAGNOSIS — G47.33 OBSTRUCTIVE SLEEP APNEA: ICD-10-CM

## 2023-09-13 DIAGNOSIS — Z12.31 SCREENING MAMMOGRAM FOR BREAST CANCER: ICD-10-CM

## 2023-09-13 DIAGNOSIS — F41.9 ANXIETY: ICD-10-CM

## 2023-09-13 DIAGNOSIS — G47.00 INSOMNIA, UNSPECIFIED TYPE: ICD-10-CM

## 2023-09-13 PROCEDURE — 99213 OFFICE O/P EST LOW 20 MIN: CPT | Performed by: FAMILY MEDICINE

## 2023-09-13 ASSESSMENT — ENCOUNTER SYMPTOMS
NAUSEA: 0
WHEEZING: 0
VOMITING: 0
ABDOMINAL PAIN: 0
DIARRHEA: 0
COUGH: 0
COLOR CHANGE: 0
BACK PAIN: 0
EYE DISCHARGE: 0

## 2023-09-13 NOTE — PROGRESS NOTES
SUBJECTIVE:    Patient ID: Kathy Ferguson is a 37 y.o. female. HPI:   Patient is seen today for an 8-week follow-up. She has a history of obstructive sleep apnea and insomnia. She is currently on a CPAP and uses it faithfully but states that she does does still wake up a couple of times at night. She typically is able to go back to sleep. She does take Requip for restless legs. She states that she never feels well rested in the mornings when she goes to get up. She states that she stays tired constantly. She states that she just feels like she is always exhausted. She does have a history of anxiety and does feel like the Prozac is working well for this. She does take Ativan as needed but she does feel like some starting the Prozac to the baseline anxiety has improved significantly. Past Medical History:   Diagnosis Date    Anxiety     Bell's palsy     Chronic migraine without aura, intractable, without status migrainosus 8/10/2017    Depression     Headache     Insomnia     Intractable migraine without aura and without status migrainosus 8/10/2017    Irritable bowel syndrome     Kidney stone 10/2017    Osteoarthritis     Periodic limb movement disorder (PLMD)     Snoring     PSG, 7/2016-mild      Current Outpatient Medications on File Prior to Visit   Medication Sig Dispense Refill    FLUoxetine (PROZAC) 40 MG capsule Take 1 capsule by mouth daily 30 capsule 5    eszopiclone (LUNESTA) 3 MG TABS Take 1 tablet by mouth nightly for 90 days. Max Daily Amount: 3 mg 30 tablet 2    sucralfate (CARAFATE) 1 GM tablet Take 1 tablet by mouth 3 times daily      Clobetasol Propionate 0.05 % SHAM Apply topically twice weekly.  120 mL 0    clobetasol (TEMOVATE) 0.05 % cream clobetasol 0.05 % topical cream   APPLY TWICE DAILY TO RASH UP TO 2 WEEKS/MONTH AS NEEDED      rOPINIRole (REQUIP) 1 MG tablet TAKE 1 TABLET THREE TIMES A  tablet 3    LORazepam (ATIVAN) 1 MG tablet Take 1 tablet by mouth every 6 hours as

## 2023-10-03 RX ORDER — CLOBETASOL PROPIONATE 0.05 G/100ML
SHAMPOO TOPICAL
Qty: 118 ML | Refills: 0 | Status: SHIPPED | OUTPATIENT
Start: 2023-10-03

## 2023-10-10 ENCOUNTER — HOSPITAL ENCOUNTER (OUTPATIENT)
Dept: WOMENS IMAGING | Age: 43
Discharge: HOME | End: 2023-10-10
Payer: OTHER GOVERNMENT

## 2023-10-10 VITALS — WEIGHT: 163 LBS | BODY MASS INDEX: 31.83 KG/M2

## 2023-10-10 DIAGNOSIS — Z12.31 SCREENING MAMMOGRAM FOR BREAST CANCER: ICD-10-CM

## 2023-10-10 PROCEDURE — 77063 BREAST TOMOSYNTHESIS BI: CPT

## 2023-11-06 RX ORDER — VALACYCLOVIR HYDROCHLORIDE 1 G/1
2000 TABLET, FILM COATED ORAL 2 TIMES DAILY PRN
Qty: 90 TABLET | Refills: 3 | Status: SHIPPED | OUTPATIENT
Start: 2023-11-06

## 2023-11-13 ENCOUNTER — OFFICE VISIT (OUTPATIENT)
Dept: PRIMARY CARE CLINIC | Age: 43
End: 2023-11-13
Payer: OTHER GOVERNMENT

## 2023-11-13 VITALS
WEIGHT: 160.2 LBS | BODY MASS INDEX: 31.45 KG/M2 | TEMPERATURE: 96.8 F | HEART RATE: 98 BPM | DIASTOLIC BLOOD PRESSURE: 86 MMHG | RESPIRATION RATE: 18 BRPM | HEIGHT: 60 IN | OXYGEN SATURATION: 98 % | SYSTOLIC BLOOD PRESSURE: 118 MMHG

## 2023-11-13 DIAGNOSIS — J02.9 SORE THROAT: ICD-10-CM

## 2023-11-13 DIAGNOSIS — J02.0 STREP THROAT: Primary | ICD-10-CM

## 2023-11-13 LAB — S PYO AG THROAT QL: POSITIVE

## 2023-11-13 PROCEDURE — 99213 OFFICE O/P EST LOW 20 MIN: CPT | Performed by: FAMILY MEDICINE

## 2023-11-13 PROCEDURE — 96372 THER/PROPH/DIAG INJ SC/IM: CPT | Performed by: FAMILY MEDICINE

## 2023-11-13 RX ORDER — CEFTRIAXONE 500 MG/1
500 INJECTION, POWDER, FOR SOLUTION INTRAMUSCULAR; INTRAVENOUS ONCE
Status: COMPLETED | OUTPATIENT
Start: 2023-11-13 | End: 2023-11-13

## 2023-11-13 RX ORDER — AMOXICILLIN 500 MG/1
500 CAPSULE ORAL 2 TIMES DAILY
Qty: 10 CAPSULE | Refills: 0 | Status: SHIPPED | OUTPATIENT
Start: 2023-11-13 | End: 2023-11-18

## 2023-11-13 RX ORDER — ESZOPICLONE 3 MG/1
3 TABLET, FILM COATED ORAL NIGHTLY
COMMUNITY
Start: 2023-10-30

## 2023-11-13 RX ADMIN — CEFTRIAXONE 500 MG: 500 INJECTION, POWDER, FOR SOLUTION INTRAMUSCULAR; INTRAVENOUS at 15:02

## 2023-11-13 ASSESSMENT — ENCOUNTER SYMPTOMS
VOMITING: 0
COUGH: 0
DIARRHEA: 0
NAUSEA: 0
EYE DISCHARGE: 0
CHEST TIGHTNESS: 0
RHINORRHEA: 0
EYE ITCHING: 0
WHEEZING: 0
ABDOMINAL PAIN: 0
EYE REDNESS: 0
COLOR CHANGE: 0
SORE THROAT: 1
SINUS PRESSURE: 0

## 2023-11-13 NOTE — PROGRESS NOTES
SUBJECTIVE:    Patient ID: Reza Garrett is a 37 y.o. female. HPI:   Patient is seen today for complaints of sore throat and ear pain. She states that her nephew that lives with her is recently been diagnosed with strep. She states that she has not run fever. She states that her throat and ear bothering her on the right side. She states that it does hurt worse when she swallows. She states that she has not had any vomiting or diarrhea. She denies any congestion or cough. She has had some fatigue and body aches. Past Medical History:   Diagnosis Date    Anxiety     Bell's palsy     Chronic migraine without aura, intractable, without status migrainosus 8/10/2017    Depression     Headache     Insomnia     Intractable migraine without aura and without status migrainosus 8/10/2017    Irritable bowel syndrome     Kidney stone 10/2017    Osteoarthritis     Periodic limb movement disorder (PLMD)     Snoring     PSG, 7/2016-mild      Current Outpatient Medications on File Prior to Visit   Medication Sig Dispense Refill    eszopiclone (LUNESTA) 3 MG TABS 1 tablet nightly. valACYclovir (VALTREX) 1 g tablet Take 2 tablets by mouth 2 times daily as needed (PRN) Indications: COLD SORES 90 tablet 3    Clobetasol Propionate 0.05 % SHAM APPLY TOPICALLY 2 TIMES A WEEK 118 mL 0    FLUoxetine (PROZAC) 40 MG capsule Take 1 capsule by mouth daily 30 capsule 5    clobetasol (TEMOVATE) 0.05 % cream clobetasol 0.05 % topical cream   APPLY TWICE DAILY TO RASH UP TO 2 WEEKS/MONTH AS NEEDED      rOPINIRole (REQUIP) 1 MG tablet TAKE 1 TABLET THREE TIMES A  tablet 3    LORazepam (ATIVAN) 1 MG tablet Take 1 tablet by mouth every 6 hours as needed.       ACZONE 7.5 % GEL topical gel       Prucalopride Succinate (MOTEGRITY) 2 MG TABS Take 2 mg by mouth daily       spironolactone (ALDACTONE) 25 MG tablet Take 1 tablet by mouth daily      omeprazole (PRILOSEC) 40 MG delayed release capsule Take 1 capsule by mouth daily

## 2023-11-17 ENCOUNTER — PATIENT MESSAGE (OUTPATIENT)
Dept: PRIMARY CARE CLINIC | Age: 43
End: 2023-11-17

## 2023-11-17 RX ORDER — CEFDINIR 300 MG/1
300 CAPSULE ORAL 2 TIMES DAILY
Qty: 14 CAPSULE | Refills: 0 | Status: SHIPPED | OUTPATIENT
Start: 2023-11-17 | End: 2023-11-24

## 2023-11-17 NOTE — TELEPHONE ENCOUNTER
From: Reza Garrett  To: Dr. Moris Schofield: 11/17/2023 10:24 AM CST  Subject: Strep    Good morning, I'm reaching out because I still feel really bad and I have only one day left of my antibiotics. I actually feel worse than I did on Monday and have only returned to the office today. It's there something else that can be done?

## 2023-11-22 ENCOUNTER — OFFICE VISIT (OUTPATIENT)
Dept: PRIMARY CARE CLINIC | Age: 43
End: 2023-11-22
Payer: OTHER GOVERNMENT

## 2023-11-22 VITALS
DIASTOLIC BLOOD PRESSURE: 80 MMHG | HEART RATE: 92 BPM | BODY MASS INDEX: 32.04 KG/M2 | TEMPERATURE: 97.2 F | HEIGHT: 60 IN | WEIGHT: 163.2 LBS | OXYGEN SATURATION: 97 % | SYSTOLIC BLOOD PRESSURE: 118 MMHG | RESPIRATION RATE: 18 BRPM

## 2023-11-22 DIAGNOSIS — J02.0 STREP THROAT: Primary | ICD-10-CM

## 2023-11-22 DIAGNOSIS — R53.83 FATIGUE, UNSPECIFIED TYPE: ICD-10-CM

## 2023-11-22 DIAGNOSIS — B37.31 VAGINAL CANDIDIASIS: ICD-10-CM

## 2023-11-22 LAB
BASOPHILS # BLD: 0 K/UL (ref 0–0.2)
BASOPHILS NFR BLD: 0.6 % (ref 0–1)
EBV VCA AB SER QL: NEGATIVE
EOSINOPHIL # BLD: 0.1 K/UL (ref 0–0.6)
EOSINOPHIL NFR BLD: 1.5 % (ref 0–5)
ERYTHROCYTE [DISTWIDTH] IN BLOOD BY AUTOMATED COUNT: 12.7 % (ref 11.5–14.5)
HCT VFR BLD AUTO: 40.5 % (ref 37–47)
HGB BLD-MCNC: 13.2 G/DL (ref 12–16)
IMM GRANULOCYTES # BLD: 0 K/UL
LYMPHOCYTES # BLD: 2.6 K/UL (ref 1.1–4.5)
LYMPHOCYTES NFR BLD: 36.1 % (ref 20–40)
MCH RBC QN AUTO: 29.7 PG (ref 27–31)
MCHC RBC AUTO-ENTMCNC: 32.6 G/DL (ref 33–37)
MCV RBC AUTO: 91.2 FL (ref 81–99)
MONOCYTES # BLD: 0.4 K/UL (ref 0–0.9)
MONOCYTES NFR BLD: 6.2 % (ref 0–10)
NEUTROPHILS # BLD: 3.9 K/UL (ref 1.5–7.5)
NEUTS SEG NFR BLD: 55.3 % (ref 50–65)
PLATELET # BLD AUTO: 290 K/UL (ref 130–400)
PMV BLD AUTO: 10.2 FL (ref 9.4–12.3)
RBC # BLD AUTO: 4.44 M/UL (ref 4.2–5.4)
WBC # BLD AUTO: 7.1 K/UL (ref 4.8–10.8)

## 2023-11-22 PROCEDURE — 99213 OFFICE O/P EST LOW 20 MIN: CPT | Performed by: FAMILY MEDICINE

## 2023-11-22 RX ORDER — FLUCONAZOLE 150 MG/1
150 TABLET ORAL
Qty: 2 TABLET | Refills: 0 | Status: SHIPPED | OUTPATIENT
Start: 2023-11-22 | End: 2023-11-28

## 2023-11-22 RX ORDER — ROPINIROLE 1 MG/1
TABLET, FILM COATED ORAL
Qty: 270 TABLET | Refills: 3 | Status: SHIPPED | OUTPATIENT
Start: 2023-11-22

## 2023-11-22 ASSESSMENT — ENCOUNTER SYMPTOMS
COLOR CHANGE: 0
COUGH: 0
ABDOMINAL PAIN: 0
NAUSEA: 0
SORE THROAT: 1
EYE DISCHARGE: 0
DIARRHEA: 0
BACK PAIN: 0
WHEEZING: 0
VOMITING: 0

## 2023-11-22 NOTE — PROGRESS NOTES
Result   TAVO EBONI 9/13/2023  8:29 AM Reviewed PDMP [1]       Urine Drug Screenings (1 yr)       POCT Rapid Drug Screen  Collected: 3/13/2023 (Final result)                  Medication Contract and Consent for Opioid Use Documents Filed        No documents found                     EMR Dragon/transcription disclaimer:  Much of this encounter note is electronic transcription/translation of spoken language toprinted texts. The electronic translation of spoken language may be erroneous, or at times, nonsensical words or phrases may be inadvertently transcribed.   Although I have reviewed the note for such errors, some may stillexist.

## 2023-11-26 ENCOUNTER — HOSPITAL ENCOUNTER (EMERGENCY)
Age: 43
Discharge: HOME | End: 2023-11-26
Payer: OTHER GOVERNMENT

## 2023-11-26 VITALS
HEART RATE: 98 BPM | RESPIRATION RATE: 16 BRPM | SYSTOLIC BLOOD PRESSURE: 134 MMHG | OXYGEN SATURATION: 96 % | DIASTOLIC BLOOD PRESSURE: 101 MMHG | TEMPERATURE: 98.1 F

## 2023-11-26 DIAGNOSIS — B34.9 ACUTE VIRAL SYNDROME: Primary | ICD-10-CM

## 2023-11-26 LAB
BILIRUB UR QL STRIP: NEGATIVE
CLARITY UR: CLEAR
COLOR UR: YELLOW
FLUAV AG NPH QL: NEGATIVE
FLUBV AG NPH QL: NEGATIVE
GLUCOSE UR STRIP.AUTO-MCNC: NEGATIVE MG/DL
HGB UR STRIP.AUTO-MCNC: NEGATIVE MG/L
KETONES UR STRIP.AUTO-MCNC: NEGATIVE MG/DL
LEUKOCYTE ESTERASE UR QL STRIP.AUTO: NEGATIVE
NITRITE UR QL STRIP.AUTO: NEGATIVE
PH UR STRIP.AUTO: 6 [PH] (ref 5–8)
PROT UR STRIP.AUTO-MCNC: NEGATIVE MG/DL
SARS-COV-2 RDRP RESP QL NAA+PROBE: NOT DETECTED
SP GR UR STRIP.AUTO: 1.02 (ref 1–1.03)
UROBILINOGEN UR STRIP.AUTO-MCNC: 0.2 E.U./DL

## 2023-11-26 PROCEDURE — 87635 SARS-COV-2 COVID-19 AMP PRB: CPT

## 2023-11-26 PROCEDURE — 87804 INFLUENZA ASSAY W/OPTIC: CPT

## 2023-11-26 PROCEDURE — 81003 URINALYSIS AUTO W/O SCOPE: CPT

## 2023-11-26 PROCEDURE — 99283 EMERGENCY DEPT VISIT LOW MDM: CPT

## 2023-11-26 ASSESSMENT — PAIN - FUNCTIONAL ASSESSMENT: PAIN_FUNCTIONAL_ASSESSMENT: 0-10

## 2023-11-26 ASSESSMENT — ENCOUNTER SYMPTOMS
ABDOMINAL PAIN: 0
DIARRHEA: 0
COUGH: 1
NAUSEA: 1
SHORTNESS OF BREATH: 0

## 2023-11-26 ASSESSMENT — PAIN SCALES - GENERAL: PAINLEVEL_OUTOF10: 4

## 2023-11-26 ASSESSMENT — PAIN DESCRIPTION - LOCATION: LOCATION: GENERALIZED

## 2023-11-26 NOTE — ED PROVIDER NOTES
[facility] EMERGENCY DEPT  EMERGENCY DEPARTMENT ENCOUNTER      Pt Name: Lana Phillips  MRN: [id number]  [facility] [facility] 1980  Date of evaluation: 11/26/2023  Provider: Madeline Benson, APRN - [address] [place] [address]       Chief Complaint   Patient presents with    [facility]     Body aches nausea and nasal drainage          HISTORY OF PRESENT ILLNESS   (Location/Symptom, Timing/Onset, Context/Setting, Quality, Duration, Modifying Factors, Severity)  Note limiting factors. Lana Phillips is a 37 y.o. female who presents to the emergency department with body aches, congestion and nausea for a few days. Reports just got better from strep throat and now with new symptoms. No chest pain or shortness of breath. HPI    Nursing Notes were reviewed. REVIEW OF SYSTEMS    (2-9 systems for level 4, 10 or more for level 5)     Review of Systems   Constitutional:  Negative for chills and fever. HENT:  Positive for congestion. Respiratory:  Positive for cough. Negative for shortness of breath. Cardiovascular:  Negative for chest pain and palpitations. Gastrointestinal:  Positive for nausea. Negative for abdominal pain and diarrhea. Genitourinary:  Negative for dysuria, flank pain, frequency and urgency. Musculoskeletal:  Positive for myalgias. Neurological:  Positive for headaches. Negative for dizziness, syncope, weakness, light-headedness and numbness. Except as noted above the remainder of the review of systems was reviewed and negative.        PAST MEDICAL HISTORY     Past Medical History:   Diagnosis Date    Anxiety     Bell's palsy     Chronic migraine without aura, intractable, without status migrainosus 8/10/2017    Depression     Headache     Insomnia     Intractable migraine without aura and without status migrainosus 8/10/2017    Irritable bowel syndrome     Kidney stone 10/2017    Osteoarthritis     Periodic limb movement disorder (PLMD)     Snoring     PSG, 7/2016-mild         SURGICAL HISTORY

## 2023-11-28 ENCOUNTER — OFFICE VISIT (OUTPATIENT)
Dept: PRIMARY CARE CLINIC | Age: 43
End: 2023-11-28
Payer: OTHER GOVERNMENT

## 2023-11-28 VITALS
SYSTOLIC BLOOD PRESSURE: 106 MMHG | WEIGHT: 161.2 LBS | BODY MASS INDEX: 31.65 KG/M2 | DIASTOLIC BLOOD PRESSURE: 80 MMHG | RESPIRATION RATE: 18 BRPM | HEART RATE: 86 BPM | OXYGEN SATURATION: 97 % | TEMPERATURE: 97.8 F | HEIGHT: 60 IN

## 2023-11-28 DIAGNOSIS — J02.9 SORE THROAT: ICD-10-CM

## 2023-11-28 DIAGNOSIS — U07.1 COVID-19: Primary | ICD-10-CM

## 2023-11-28 DIAGNOSIS — R52 BODY ACHES: ICD-10-CM

## 2023-11-28 DIAGNOSIS — R05.9 COUGH, UNSPECIFIED TYPE: ICD-10-CM

## 2023-11-28 LAB
B PARAP IS1001 DNA NPH QL NAA+NON-PROBE: NOT DETECTED
B PERT.PT PRMT NPH QL NAA+NON-PROBE: NOT DETECTED
C PNEUM DNA NPH QL NAA+NON-PROBE: NOT DETECTED
FLUAV RNA NPH QL NAA+NON-PROBE: NOT DETECTED
FLUBV RNA NPH QL NAA+NON-PROBE: NOT DETECTED
HADV DNA NPH QL NAA+NON-PROBE: NOT DETECTED
HCOV 229E RNA NPH QL NAA+NON-PROBE: NOT DETECTED
HCOV HKU1 RNA NPH QL NAA+NON-PROBE: NOT DETECTED
HCOV NL63 RNA NPH QL NAA+NON-PROBE: NOT DETECTED
HCOV OC43 RNA NPH QL NAA+NON-PROBE: NOT DETECTED
HMPV RNA NPH QL NAA+NON-PROBE: NOT DETECTED
HPIV1 RNA NPH QL NAA+NON-PROBE: NOT DETECTED
HPIV2 RNA NPH QL NAA+NON-PROBE: NOT DETECTED
HPIV3 RNA NPH QL NAA+NON-PROBE: NOT DETECTED
HPIV4 RNA NPH QL NAA+NON-PROBE: NOT DETECTED
M PNEUMO DNA NPH QL NAA+NON-PROBE: NOT DETECTED
RSV RNA NPH QL NAA+NON-PROBE: NOT DETECTED
RV+EV RNA NPH QL NAA+NON-PROBE: NOT DETECTED
SARS-COV-2 RNA NPH QL NAA+NON-PROBE: DETECTED

## 2023-11-28 PROCEDURE — 99213 OFFICE O/P EST LOW 20 MIN: CPT | Performed by: FAMILY MEDICINE

## 2023-11-28 ASSESSMENT — ENCOUNTER SYMPTOMS
ABDOMINAL PAIN: 0
COLOR CHANGE: 0
RHINORRHEA: 0
VOMITING: 0
SINUS PRESSURE: 0
SORE THROAT: 1
SINUS PAIN: 1
EYE DISCHARGE: 0
WHEEZING: 0
DIARRHEA: 0
CHEST TIGHTNESS: 0
EYE ITCHING: 0
NAUSEA: 0
COUGH: 1
EYE REDNESS: 0

## 2023-12-08 ENCOUNTER — OFFICE VISIT (OUTPATIENT)
Dept: NEUROLOGY | Age: 43
End: 2023-12-08
Payer: OTHER GOVERNMENT

## 2023-12-08 ENCOUNTER — TELEPHONE (OUTPATIENT)
Dept: NEUROLOGY | Age: 43
End: 2023-12-08

## 2023-12-08 VITALS
DIASTOLIC BLOOD PRESSURE: 86 MMHG | HEIGHT: 60 IN | OXYGEN SATURATION: 99 % | BODY MASS INDEX: 31.41 KG/M2 | HEART RATE: 94 BPM | SYSTOLIC BLOOD PRESSURE: 123 MMHG | WEIGHT: 160 LBS

## 2023-12-08 DIAGNOSIS — R40.0 SOMNOLENCE, DAYTIME: ICD-10-CM

## 2023-12-08 DIAGNOSIS — G47.33 OBSTRUCTIVE SLEEP APNEA: Primary | ICD-10-CM

## 2023-12-08 DIAGNOSIS — R44.2 HYPNAGOGIC HALLUCINATIONS: ICD-10-CM

## 2023-12-08 DIAGNOSIS — Z78.9 INTOLERANCE OF CONTINUOUS POSITIVE AIRWAY PRESSURE (CPAP) VENTILATION: ICD-10-CM

## 2023-12-08 DIAGNOSIS — G47.00 INSOMNIA, UNSPECIFIED TYPE: ICD-10-CM

## 2023-12-08 PROCEDURE — 99214 OFFICE O/P EST MOD 30 MIN: CPT | Performed by: PHYSICIAN ASSISTANT

## 2023-12-08 NOTE — PROGRESS NOTES
[facility] Neurology and Sleep Medicine  [address] [facility], [address] [place] [address]  Phone [phone]  Fax [phone]       [facility] Sleep Follow Up Encounter      Information:   Patient Name: Lidya Saavedra  :   1980  Age:   37 y.o. MRN:   [id number]  Account #:  [de-identified]  Today:                23    Provider:  Keyanna Sanchez PA-C    Chief Complaint   Patient presents with    New Patient    Sleep Apnea        Subjective:   Lidya Saavedra is a 37 y.o. female who has  has a past medical history of Anxiety, Bell's palsy, Chronic migraine without aura, intractable, without status migrainosus, CPAP (continuous positive airway pressure) dependence, CPAP (continuous positive airway pressure) dependence, Depression, Headache, Insomnia, Intractable migraine without aura and without status migrainosus, Irritable bowel syndrome, Kidney stone, JAMES (obstructive sleep apnea), JAMES (obstructive sleep apnea), Osteoarthritis, Periodic limb movement disorder (PLMD), and Snoring. She had an HST in  that revealed mild snoring but no significant JAMES with an AHI of 2.6. She was noted to have RLS. She moved to [place] and had a repeat HST in  and was diagnosed with JAMES. She was prescribed CPAP. She didn't receive the CPAP until this year. She c/o that she removes the mask unknowingly. She is intolerant to the pressure at times and removes the mask. She tries to use the mask nightly. She sometimes has difficulty initiating and maintaining sleep. She takes Lunesta 3 mg q hs. She takes lorazepam 1 mg but only once per week. She has frequent awakenings. She has restless sleep. She has RLS and takes ropinirole. It is effective. Bedtime is between 8:30 and 9:00 pm and planned wake up time is 6:30 AM. Her sleep is non-restorative. She has excessive daytime somnolence. She stays exhausted. The ESS score is 18. She is current on laboratory studies. Her weight has remained stable since the  HST.  She denies sleep

## 2023-12-08 NOTE — TELEPHONE ENCOUNTER
[address] [facility] at [phone] and left a VM to get a current compliance report of patient sleep machine faxed to our office.

## 2023-12-08 NOTE — PATIENT INSTRUCTIONS
stopped breathing (apnea), choking, or gasping during sleep, there is a strong possibility of sleep apnea. Testing is usually performed in a sleep laboratory. A full sleep study is called a polysomnogram. The polysomnogram measures the breathing effort and airflow, blood oxygen level, heart rate and rhythm, duration of the various stages of sleep, body position, and movement of the arms/legs. Home monitoring devices are available that can perform a sleep study. This is a reasonable alternative to conventional testing in a sleep laboratory if the clinician strongly suspects moderate or severe sleep apnea and the patient does not have other illnesses or sleep disorders that may interfere with the results. SLEEP APNEA TREATMENT -- Sleep apnea is best treated by a knowledgeable sleep medicine specialist. The goal of treatment is to maintain an open airway during sleep. Effective treatment will eliminate the symptoms of sleep disturbance; long-term health consequences are also reduced. Most treatments require nightly use. The challenge for the clinician and the patient is to select an effective therapy that is appropriate for the patient's problem and that is acceptable for long term use. Auto-titrating CPAP delivers an amount of PAP that varies during the night. The variation is dependent on event detection software algorithms, which will increase the pressure gradually in response to flow changes until adequate patency is detected. After a period of sustained upper airway patency, the delivered level of pressure gradually decreases until the algorithm identifies recurrent upper airway obstruction, at which point the delivered pressure again increases. The result is that the delivered pressure varies throughout the night, in an effort to provide the lowest pressure that is necessary to maintain upper airway patency.     Continuous positive airway pressure (CPAP) -- The most effective treatment for sleep apnea

## 2023-12-08 NOTE — PROGRESS NOTES
REVIEW OF SYSTEMS    Constitutional: []Fever []Sweats []Chills [] Recent Injury   [x] Denies all unless marked  HENT:[]Headache  [] Head Injury  [] Sore Throat  [] Ear Pain  [] Dizziness [] Hearing Loss   [x] Denies all unless marked  Musculoskeletal: [] Arthralgia  [] Myalgias [] Muscle cramps  [] Muscle twitches   [x] Denies all unless marked   Spine:  [] Neck pain  [] Back pain  [] Sciatica  [x] Denies all unless marked  Neurological:[] Visual Disturbance [] Double Vision [] Slurred Speech [] Trouble swallowing  [] Vertigo [] Tingling [] Numbness [] Weakness [] Loss of Balance   [] Loss of Consciousness [] Memory Loss [] Seizures  [x] Denies all unless marked  Psychiatric/Behavioral:[] Depression [] Anxiety  [x] Denies all unless marked  Sleep: [x]  Insomnia [] Sleep Disturbance [] Snoring [] Restless Legs [] Daytime Sleepiness [] Sleep Apnea  [] Denies all unless marked

## 2023-12-11 PROBLEM — Z78.9 INTOLERANCE OF CONTINUOUS POSITIVE AIRWAY PRESSURE (CPAP) VENTILATION: Status: ACTIVE | Noted: 2023-12-11

## 2023-12-11 PROBLEM — R40.0 SOMNOLENCE, DAYTIME: Status: ACTIVE | Noted: 2023-12-11

## 2023-12-11 PROBLEM — R44.2 HYPNAGOGIC HALLUCINATIONS: Status: ACTIVE | Noted: 2023-12-11

## 2024-01-10 ENCOUNTER — TELEPHONE (OUTPATIENT)
Dept: PRIMARY CARE CLINIC | Age: 44
End: 2024-01-10

## 2024-01-10 RX ORDER — FLUOXETINE 10 MG/1
10 CAPSULE ORAL DAILY
Qty: 30 CAPSULE | Refills: 2 | Status: SHIPPED | OUTPATIENT
Start: 2024-01-10 | End: 2024-04-09

## 2024-01-25 LAB — NUCLEAR IGG SER QL IA: NORMAL

## 2024-02-06 ENCOUNTER — OFFICE VISIT (OUTPATIENT)
Dept: PRIMARY CARE CLINIC | Age: 44
End: 2024-02-06
Payer: OTHER GOVERNMENT

## 2024-02-06 ENCOUNTER — ANCILLARY PROCEDURE (OUTPATIENT)
Dept: PRIMARY CARE CLINIC | Age: 44
End: 2024-02-06
Payer: OTHER GOVERNMENT

## 2024-02-06 VITALS
OXYGEN SATURATION: 98 % | DIASTOLIC BLOOD PRESSURE: 70 MMHG | BODY MASS INDEX: 31.8 KG/M2 | HEIGHT: 60 IN | TEMPERATURE: 96.2 F | HEART RATE: 93 BPM | SYSTOLIC BLOOD PRESSURE: 106 MMHG | WEIGHT: 162 LBS | RESPIRATION RATE: 18 BRPM

## 2024-02-06 DIAGNOSIS — Z13.31 POSITIVE DEPRESSION SCREENING: ICD-10-CM

## 2024-02-06 DIAGNOSIS — R20.0 RIGHT ARM NUMBNESS: Primary | ICD-10-CM

## 2024-02-06 DIAGNOSIS — G89.29 CHRONIC PAIN OF RIGHT ANKLE: ICD-10-CM

## 2024-02-06 DIAGNOSIS — M25.571 CHRONIC PAIN OF RIGHT ANKLE: ICD-10-CM

## 2024-02-06 PROCEDURE — 73610 X-RAY EXAM OF ANKLE: CPT | Performed by: FAMILY MEDICINE

## 2024-02-06 PROCEDURE — 99213 OFFICE O/P EST LOW 20 MIN: CPT | Performed by: FAMILY MEDICINE

## 2024-02-06 RX ORDER — TRIAMCINOLONE ACETONIDE 5 MG/G
OINTMENT TOPICAL 2 TIMES DAILY
COMMUNITY

## 2024-02-06 ASSESSMENT — PATIENT HEALTH QUESTIONNAIRE - PHQ9
SUM OF ALL RESPONSES TO PHQ QUESTIONS 1-9: 15
8. MOVING OR SPEAKING SO SLOWLY THAT OTHER PEOPLE COULD HAVE NOTICED. OR THE OPPOSITE, BEING SO FIGETY OR RESTLESS THAT YOU HAVE BEEN MOVING AROUND A LOT MORE THAN USUAL: 3
SUM OF ALL RESPONSES TO PHQ QUESTIONS 1-9: 15
3. TROUBLE FALLING OR STAYING ASLEEP: 0
7. TROUBLE CONCENTRATING ON THINGS, SUCH AS READING THE NEWSPAPER OR WATCHING TELEVISION: 2
SUM OF ALL RESPONSES TO PHQ QUESTIONS 1-9: 15
SUM OF ALL RESPONSES TO PHQ9 QUESTIONS 1 & 2: 6
4. FEELING TIRED OR HAVING LITTLE ENERGY: 3
1. LITTLE INTEREST OR PLEASURE IN DOING THINGS: 3
SUM OF ALL RESPONSES TO PHQ QUESTIONS 1-9: 15
10. IF YOU CHECKED OFF ANY PROBLEMS, HOW DIFFICULT HAVE THESE PROBLEMS MADE IT FOR YOU TO DO YOUR WORK, TAKE CARE OF THINGS AT HOME, OR GET ALONG WITH OTHER PEOPLE: 1
2. FEELING DOWN, DEPRESSED OR HOPELESS: 3
9. THOUGHTS THAT YOU WOULD BE BETTER OFF DEAD, OR OF HURTING YOURSELF: 0
5. POOR APPETITE OR OVEREATING: 0
6. FEELING BAD ABOUT YOURSELF - OR THAT YOU ARE A FAILURE OR HAVE LET YOURSELF OR YOUR FAMILY DOWN: 1

## 2024-02-06 NOTE — PROGRESS NOTES
numbness.  Will notify her of the results of those and determine further testing once we receive those back.  X-ray of the ankle was obtained today and we will notify her of the results of that as well.  Discussed that we may need to get a referral put in for her to see podiatry once we get set up to get the x-ray back due to her previous surgery on this ankle but we will get the x-ray first and then go from there.    PDMP Monitoring:    Last PDMP [place] as Reviewed ([place]):  Review User Review Instant Review Result   EBONI VO 9/13/2023  8:29 AM Reviewed PDMP [1]       Urine Drug Screenings (1 yr)       POCT Rapid Drug Screen  Collected: 3/13/2023 (Final result)                  Medication Contract and Consent for Opioid Use Documents Filed        No documents found                     EMR Dragon/transcription disclaimer:  Much of this encounter note is electronic transcription/translation of spoken language toprinted texts.  The electronic translation of spoken language may be erroneous, or at times, nonsensical words or phrases may be inadvertently transcribed.  Although I have reviewed the note for such errors, some may stillexist.PHQ-9 score today: (PHQ-9 Total Score: 15), additional evaluation and assessment performed, follow-up plan includes but not limited to: Medication management and Referral to /Specialist  for evaluation and management.

## 2024-02-07 ENCOUNTER — HOSPITAL ENCOUNTER (OUTPATIENT)
Dept: SLEEP CENTER | Age: 44
Discharge: HOME | End: 2024-02-09
Payer: OTHER GOVERNMENT

## 2024-02-07 PROCEDURE — 95811 POLYSOM 6/>YRS CPAP 4/> PARM: CPT

## 2024-02-07 ASSESSMENT — ENCOUNTER SYMPTOMS
VOMITING: 0
ABDOMINAL PAIN: 0
COLOR CHANGE: 0
EYE DISCHARGE: 0
BACK PAIN: 0
DIARRHEA: 0
NAUSEA: 0
COUGH: 0
WHEEZING: 0

## 2024-02-08 NOTE — PROGRESS NOTES
[facility]  [facility] [place]  [address]  Phone [phone] Fax [phone]     Sleep Study Technician Review    Patient Name:  Ruth Sherman  :   1980  Referring Provider: Heather Stacy PA    [facility] Fall Risk Assessment    Have you fallen in the past year? YES[] NO[x]  Do you feel unsteady when standing or walking? YES[] NO[x]  Are you worried about falling? YES[] NO[x]     aFall Risk screening requirement has been met    Not at risk for falls.    Brief History:  Ruth Sherman is a 43 y.o. female with a history of Anxiety, [place]' Palsy, Depression, EDS, Migraine, Insomnia, JAMES, Snoring, and hypnagogic hallucinations who presented for a CPAP titration with MSLT to follow. Previous HST on 2023 revealed an AHI of 9.2 with an SpO2 ronal of 81%. Pt was placed on APAP of 4-96idT1K.       Height:   60\"  Weight: 160lbs  BMI:  31.3  Neck Circ: 14\"  Mallampati  4  ESS:  18    Type of Study: PSG with CPAP titration  Time Stage Position Snore Hypopnea Obs Apnea Jamaal Apnea PAP O2   2200 N2 Left Yes No No No 4 RA   2300 REM/N2 Right Yes Yes No No 7 RA   2400 N2 Supine No No No No 7 RA   0100 N2 Supine Yes No No No 7 RA   0200 N2 Right Yes No No No 8 RA   0300 N2 Right No No No No 8 RA   0400 REM Supine No No No No 8 RA   0448 L/on N2 Supine No Yes No No 9 RA     Summary: Pt arrived at the sleep center on time. Tech introduced self, verified pt's name and , and escorted pt to room. Tech explained procedure and answered questions. Pt was instructed in supine sleep. Pt verbalized understanding of procedure. Pt was prepared for PSG per protocol without complication. Pt was sized and fitted with a medium Respironics Jina View full-face mask. Sleep latency was within normal limits. CPAP was titrated to a final pressure of 9cmH2O. Pt tolerated mask and therapy well. The SpO2 ronal was 91%. EKG showed NSR. Limb movements were noted throughout the study. Nocturia x

## 2024-02-09 DIAGNOSIS — G89.29 CHRONIC PAIN OF RIGHT ANKLE: Primary | ICD-10-CM

## 2024-02-09 DIAGNOSIS — M25.571 CHRONIC PAIN OF RIGHT ANKLE: Primary | ICD-10-CM

## 2024-02-12 RX ORDER — LAMOTRIGINE 25 MG/1
25 TABLET ORAL 2 TIMES DAILY
Qty: 30 TABLET | Refills: 3 | Status: SHIPPED | OUTPATIENT
Start: 2024-02-12

## 2024-02-12 RX ORDER — LAMOTRIGINE 25 MG/1
25 TABLET ORAL 2 TIMES DAILY
Qty: 30 TABLET | Refills: 3 | Status: SHIPPED | OUTPATIENT
Start: 2024-02-12 | End: 2024-02-12 | Stop reason: SDUPTHER

## 2024-02-12 NOTE — TELEPHONE ENCOUNTER
Can you find out if she is wanting me to send this to [facility] or if she wants me to send it to somewhere local?

## 2024-02-21 ENCOUNTER — HOSPITAL ENCOUNTER (OUTPATIENT)
Dept: NEUROLOGY | Age: 44
Discharge: HOME | End: 2024-02-21
Payer: OTHER GOVERNMENT

## 2024-02-21 DIAGNOSIS — R20.0 RIGHT ARM NUMBNESS: ICD-10-CM

## 2024-02-21 PROCEDURE — 95886 MUSC TEST DONE W/N TEST COMP: CPT | Performed by: PSYCHIATRY & NEUROLOGY

## 2024-02-21 PROCEDURE — 95909 NRV CNDJ TST 5-6 STUDIES: CPT | Performed by: PSYCHIATRY & NEUROLOGY

## 2024-02-21 PROCEDURE — 95886 MUSC TEST DONE W/N TEST COMP: CPT

## 2024-02-21 PROCEDURE — 95909 NRV CNDJ TST 5-6 STUDIES: CPT

## 2024-02-21 NOTE — PROCEDURES
[facility]  Neurophysiology Department  [address] [place]  [address]  Phone [phone]  Fax [phone]     NEUROPHYSIOLOGY REPORT  Patient Data  Patient Name Ruth Sherman Referring Provider Jenny Lynn MD   Account Number [id number] Interpreting physician Ivan Cannon M.D.    1980 Technologist Duyen Thompson   Age 43 Test Nerve conduction studies/electromyogram   Indications for the test pain, numbness, and tingling Date of test 2024       HISTORY:     Ruth Sherman is a 43 year old woman who complains of pain, numbness, and tingling in the right upper extremity.      SUMMARY:     Nerve conduction studies of the right upper extremity showed a mildly prolonged median sensory distal latency.     Electromyogram of the right upper extremity was normal.      INTERPRETATION:     The findings are those of a mild median neuropathy at the right wrist (carpal tunnel syndrome).                            Ivan Cannon M.D.      Sensory NCS      Nerve / Sites Rec. Site Onset Lat Peak Lat NP Amp PP Amp Segments Distance Peak Diff Velocity     ms ms µV µV  cm ms m/s   R Median, Ulnar - Transcarpal comparison      Median Palm Wrist 1.8 2.3 20.9 20.0 Median Palm - Wrist 8  44      Ref.   ?2.2 ?50.0  Ref.         Ulnar Palm Wrist 1.2 1.9 16.4 10.3 Ulnar Palm - Wrist 8  65      Ref.   ?2.2 ?15.0  Ref.            Median Palm - Ulnar Palm  0.5          Ref.  ?0.3    R Radial - Anatomical snuff box (Forearm)      Forearm Wrist 2.5 3.3 19.7 12.9 Forearm - Wrist 10  39      Ref.   ?2.9 ?20.0  Ref.              Motor NCS      Nerve / Sites Muscle Latency Ref. Amplitude Ref. Amp % Duration Segments Distance Lat Diff Velocity Ref.     ms ms mV mV % ms  cm ms m/s m/s   R Median - APB      Wrist APB 3.3 ?4.4 7.7 ?4.0 100 6.7 Wrist - APB 7         Elbow APB 7.3  7.0  90.5 7.2 Elbow - Wrist 20 3.9 51 ?49   R Ulnar - ADM      Wrist ADM 2.1 ?3.5 8.1 ?6.0 100 5.5

## 2024-02-23 DIAGNOSIS — G56.03 BILATERAL CARPAL TUNNEL SYNDROME: Primary | ICD-10-CM

## 2024-02-26 DIAGNOSIS — G56.03 BILATERAL CARPAL TUNNEL SYNDROME: Primary | ICD-10-CM

## 2024-02-27 ENCOUNTER — OFFICE VISIT (OUTPATIENT)
Dept: PRIMARY CARE CLINIC | Age: 44
End: 2024-02-27
Payer: OTHER GOVERNMENT

## 2024-02-27 VITALS
DIASTOLIC BLOOD PRESSURE: 82 MMHG | SYSTOLIC BLOOD PRESSURE: 122 MMHG | TEMPERATURE: 99.2 F | OXYGEN SATURATION: 98 % | WEIGHT: 163 LBS | HEART RATE: 83 BPM | BODY MASS INDEX: 31.83 KG/M2

## 2024-02-27 DIAGNOSIS — F41.9 ANXIETY: ICD-10-CM

## 2024-02-27 DIAGNOSIS — G56.03 BILATERAL CARPAL TUNNEL SYNDROME: Primary | ICD-10-CM

## 2024-02-27 PROCEDURE — 99214 OFFICE O/P EST MOD 30 MIN: CPT | Performed by: FAMILY MEDICINE

## 2024-02-27 RX ORDER — LORAZEPAM 1 MG/1
1 TABLET ORAL EVERY 6 HOURS PRN
Qty: 30 TABLET | Refills: 2 | Status: SHIPPED | OUTPATIENT
Start: 2024-02-27 | End: 2024-03-28

## 2024-02-27 ASSESSMENT — ENCOUNTER SYMPTOMS
VOMITING: 0
BACK PAIN: 0
COLOR CHANGE: 0
ABDOMINAL PAIN: 0
EYE DISCHARGE: 0
COUGH: 0
NAUSEA: 0
DIARRHEA: 0
WHEEZING: 0

## 2024-02-27 NOTE — PROGRESS NOTES
SUBJECTIVE:    Patient ID: Ruth Sherman is a 43 y.o. female.    HPI:   Patient is seen today for 3-week follow-up after recently starting Lamictal for mood stabilizer.  She feels like this is working very well and does not need feel that the dose needs to be adjusted at this time.  She is tolerating it well and denies any side effects.  She does request a refill for her Ativan today.  She only takes this very sparingly.  This works well for her and she has been on it for a long time    She states that she also is going to have an appointment with OT on Monday for her carpal tunnel.  She is currently wearing a brace.  She states that she has not had any worsening of her symptoms but it has not improved either.  Overall she feels like she is doing better    Past Medical History:   Diagnosis Date    Anxiety     Bell's palsy     Chronic migraine without aura, intractable, without status migrainosus 08/10/2017    CPAP (continuous positive airway pressure) dependence     CPAP (continuous positive airway pressure) dependence     Depression     Headache     Insomnia     Intractable migraine without aura and without status migrainosus 08/10/2017    Irritable bowel syndrome     Kidney stone 10/2017    JAMES (obstructive sleep apnea)     JAMES (obstructive sleep apnea)     Osteoarthritis     Periodic limb movement disorder (PLMD)     Snoring     PSG, 7/2016-mild      Current Outpatient Medications on File Prior to Visit   Medication Sig Dispense Refill    lamoTRIgine (LAMICTAL) 25 MG tablet Take 1 tablet by mouth 2 times daily 30 tablet 3    Magnesium 400 MG CAPS Take by mouth      Omega-3 Fatty Acids (OMEGA-3 FISH OIL PO) Take by mouth      COLLAGEN PO Take by mouth      triamcinolone (ARISTOCORT) 0.5 % ointment Apply topically 2 times daily Apply topically 2 times daily.      rOPINIRole (REQUIP) 1 MG tablet TAKE 1 TABLET THREE TIMES A  tablet 3    eszopiclone (LUNESTA) 3 MG TABS 1 tablet nightly.      Clobetasol

## 2024-02-29 ENCOUNTER — FOLLOWUP TELEPHONE ENCOUNTER (OUTPATIENT)
Dept: SLEEP CENTER | Age: 44
End: 2024-02-29

## 2024-03-05 ENCOUNTER — OFFICE VISIT (OUTPATIENT)
Dept: PRIMARY CARE CLINIC | Age: 44
End: 2024-03-05
Payer: OTHER GOVERNMENT

## 2024-03-05 VITALS
BODY MASS INDEX: 32.98 KG/M2 | SYSTOLIC BLOOD PRESSURE: 110 MMHG | HEART RATE: 82 BPM | OXYGEN SATURATION: 99 % | HEIGHT: 60 IN | TEMPERATURE: 97.6 F | DIASTOLIC BLOOD PRESSURE: 72 MMHG | RESPIRATION RATE: 16 BRPM | WEIGHT: 168 LBS

## 2024-03-05 DIAGNOSIS — J06.9 VIRAL URI: Primary | ICD-10-CM

## 2024-03-05 DIAGNOSIS — J02.9 SORE THROAT: ICD-10-CM

## 2024-03-05 LAB — S PYO AG THROAT QL: NORMAL

## 2024-03-05 PROCEDURE — 99213 OFFICE O/P EST LOW 20 MIN: CPT | Performed by: FAMILY MEDICINE

## 2024-03-05 RX ORDER — FEXOFENADINE HCL AND PSEUDOEPHEDRINE HCI 180; 240 MG/1; MG/1
1 TABLET, EXTENDED RELEASE ORAL DAILY
Qty: 30 TABLET | Refills: 0 | Status: SHIPPED | OUTPATIENT
Start: 2024-03-05

## 2024-03-05 RX ORDER — BENZONATATE 200 MG/1
200 CAPSULE ORAL 3 TIMES DAILY PRN
Qty: 30 CAPSULE | Refills: 0 | Status: SHIPPED | OUTPATIENT
Start: 2024-03-05 | End: 2024-03-12

## 2024-03-05 ASSESSMENT — ENCOUNTER SYMPTOMS
ABDOMINAL PAIN: 1
CHEST TIGHTNESS: 0
BLOOD IN STOOL: 0
SHORTNESS OF BREATH: 0
WHEEZING: 0
SORE THROAT: 1
COUGH: 1
VOMITING: 0
NAUSEA: 0

## 2024-03-05 NOTE — PROGRESS NOTES
Ruth Sherman (:  1980) is a 43 y.o. female,Established patient, here for evaluation of the following chief complaint(s):  Pharyngitis (Sore throat with itchiness since Saturday. Cough started today. Denies any fever, headache, earache.)         ASSESSMENT/PLAN:  1. Viral URI  -     fexofenadine-pseudoephedrine (ALLEGRA-D 24HR) 180-240 MG per extended release tablet; Take 1 tablet by mouth daily, Disp-30 tablet, R-0Normal  -     benzonatate (TESSALON) 200 MG capsule; Take 1 capsule by mouth 3 times daily as needed for Cough, Disp-30 capsule, R-0Normal  -     Benzocaine 15 MG LOZG; Take 1 lozenge by mouth every 3-4 hours as needed (sore throat), Disp-25 lozenge, R-0Normal  2. Sore throat  -     POCT rapid strep A    Very likely the patient's symptoms are secondary to a viral infection.  Strep swab completed in office and negative at this time.  There is some potential that symptoms could be solely a result of postnasal drip though given other symptoms I think that this is lower likelihood.  I have sent through some benzonatate for symptomatic management of cough, benzocaine lozenges for sore throat, and Allegra-D for her congestion. Many illnesses are caused by viruses. These conditions usually run their course in 7-14 days.  Antibiotics do not help fight viral infections and are not needed at this time. Viral syndromes are treated with symptomatic support. You may take tylenol or ibuprofen for fever or aches and pains. Stay hydrated by taking sips of water or non caffeinated, noncarbonated, and nonalcoholic beverages throughout the day. For sore throat, you may gargle with warm salt water, use lozenges or sprays. Using a daily antihistamine such as Claritin, Zyrtec or Allegra can help with upper respiratory symptoms. Benadryl can be sedating but is helpful at drying secretions and may be taken at night. Call if you have a fever greater than 102 F or if symptoms do not improve. Your provider may also send

## 2024-03-20 ENCOUNTER — HOSPITAL ENCOUNTER (OUTPATIENT)
Dept: OCCUPATIONAL THERAPY | Age: 44
Setting detail: THERAPIES SERIES
Discharge: HOME | End: 2024-03-20

## 2024-03-20 NOTE — PROGRESS NOTES
Occupational Therapy Note  Date: 3/20/2024  Patient Name: Ruth Sherman  :  1980  MRN: [id number]       Patient presents to outpatient therapy clinic this date for Occupational Therapy evaluation for bilateral carpal tunnel syndrome. Upon asking patient on what her symptoms were she reports primarily \"tingling and numbness radiating down from her shoulder to hand\". She reports \"my whole arm goes numb\". Based on this and further palpation on her right neck scalenes, patient's symptoms were elicited and she had a \"jump reaction\". Symptoms not elicited on left side of neck with palpation. Patient reports symptoms occur when she is performing activities that require her to lift her arm (grooming tasks, reaching for items, etc). She reports the symptoms do not occur when she is typing (arm down at side). Her EMG reports mild carpal tunnel in RUE. Based on symptoms though, this does not appear to be carpal tunnel related. Educated patient on wearing brace at night for mild carpal tunnel, correct ergonomics, and stretches. Patient recommended for further testing for cervical radiculopathy related diagnoses and also PT referral. No further OT services indicated.    Annel Jimenez OT Electronically signed by AMALIA Austin, OTR/L, CLT, CSRS on 3/20/2024 at 11:03 AM

## 2024-03-26 ENCOUNTER — HOSPITAL ENCOUNTER (OUTPATIENT)
Dept: MRI IMAGING | Age: 44
Discharge: HOME | End: 2024-03-26
Payer: OTHER GOVERNMENT

## 2024-03-26 DIAGNOSIS — M25.571 PAIN IN JOINT, ANKLE AND FOOT, RIGHT: ICD-10-CM

## 2024-03-26 DIAGNOSIS — M87.071 IDIOPATHIC ASEPTIC NECROSIS OF RIGHT ANKLE (HCC): ICD-10-CM

## 2024-03-26 DIAGNOSIS — M24.173 OTHER ARTICULAR CARTILAGE DISORDERS, UNSPECIFIED ANKLE: ICD-10-CM

## 2024-03-26 PROCEDURE — 6360000004 HC RX CONTRAST MEDICATION: Performed by: NURSE PRACTITIONER

## 2024-03-26 PROCEDURE — 73723 MRI JOINT LWR EXTR W/O&W/DYE: CPT

## 2024-03-26 PROCEDURE — A9577 INJ MULTIHANCE: HCPCS | Performed by: NURSE PRACTITIONER

## 2024-03-26 RX ADMIN — GADOBENATE DIMEGLUMINE 15 ML: 529 INJECTION, SOLUTION INTRAVENOUS at 15:59

## 2024-04-08 DIAGNOSIS — G47.00 INSOMNIA, UNSPECIFIED TYPE: Primary | ICD-10-CM

## 2024-04-08 DIAGNOSIS — M54.12 CERVICAL RADICULOPATHY: Primary | ICD-10-CM

## 2024-04-08 RX ORDER — ESZOPICLONE 3 MG/1
TABLET, FILM COATED ORAL
Qty: 30 TABLET | Refills: 0 | Status: SHIPPED | OUTPATIENT
Start: 2024-04-08 | End: 2024-05-08

## 2024-04-08 NOTE — TELEPHONE ENCOUNTER
PDMP Monitoring:    Last PDMP [place] as Reviewed ([place]):  Review User Review Instant Review Result   EBONI VO 2/27/2024  9:17 AM Reviewed PDMP [1]     Urine Drug Screenings (1 yr)       POCT Rapid Drug Screen  Collected: 3/13/2023 (Final result)                  Medication Contract and Consent for Opioid Use Documents Filed        No documents found

## 2024-04-12 ENCOUNTER — HOSPITAL ENCOUNTER (OUTPATIENT)
Dept: PHYSICAL THERAPY | Age: 44
Setting detail: THERAPIES SERIES
Discharge: HOME | End: 2024-04-12
Payer: OTHER GOVERNMENT

## 2024-04-12 PROCEDURE — 97162 PT EVAL MOD COMPLEX 30 MIN: CPT

## 2024-04-12 ASSESSMENT — PAIN DESCRIPTION - ORIENTATION: ORIENTATION: RIGHT

## 2024-04-12 ASSESSMENT — PAIN DESCRIPTION - PAIN TYPE: TYPE: CHRONIC PAIN

## 2024-04-12 ASSESSMENT — PAIN DESCRIPTION - LOCATION: LOCATION: ARM;SHOULDER;NECK

## 2024-04-12 NOTE — PROGRESS NOTES
of R shoulder pain.  PMH includes R ankle sx, hx migraines, hx Bell's palsy   Prior diagnostic testing:: EMG      Pain Screening   Pain Screening  Patient Currently in Pain: Yes  Pain Assessment: 0-10  Pain Type: Chronic pain  Pain Location: Arm, Shoulder, Neck  Pain Orientation: Right  Pain Descriptors: Sore, Tightness, Numbness, Tingling    Functional Status       Occupation/Interests:  Occupation: Full time employment    Current Level of Function:         ADL Assistance: Independent  Homemaking Assistance: Independent  Ambulation Assistance: Independent  Active : Yes    OBJECTIVE EXAMINATION     Review of Systems:  Vision: Within Functional Limits  Hearing: Within functional limits  Overall Orientation Status: Within Normal Limits  Follows Commands: Within Functional Limits    Observations:  General Observations  Description: Rounded shoulders, forward head posture, and mild thoracic hyperkyphosis    Palpation:   Cervical Spine Palpation: Increased muscle tension in bilateral upper traps, levator, scalenes (R>L) with mild pain with palpation of R scalenes.  Hypomobile first rib bilaterally (R>L)    Left AROM  Right AROM      General AROM UE: Right WNL, Left WNL    General AROM UE: Right WNL, Left WNL        Left Strength  Right Strength         Strength LUE  L Shoulder Flexion: 5/5  L Shoulder ABduction: 5/5  L Elbow Flexion: 5/5  L Elbow Extension: 5/5  L Forearm Pron: 5/5  L Forearm Sup: 5/5  L Wrist Flexion: 5/5  L Wrist Extension: 5/5    Strength RUE  R Shoulder Flexion: 5/5  R Shoulder ABduction: 5/5  R Elbow Flexion: 5/5  R Elbow Extension: 5/5  R Forearm Pron: 5/5  R Forearm Sup: 5/5  R Wrist Flexion: 5/5  R Wrist Extension: 5/5     Cervical Assessment   AROM Cervical Spine   Measured as: Degrees  Cervical flexion: 60  Cervical extension: 40  Cervical right lateral: 50  Cervical left lateral: 35  Cervical right rotation:  (100%)  Cervical left rotation:  (100%)         Special Tests:   Special Tests

## 2024-04-17 ENCOUNTER — HOSPITAL ENCOUNTER (OUTPATIENT)
Dept: PHYSICAL THERAPY | Age: 44
Setting detail: THERAPIES SERIES
Discharge: HOME | End: 2024-04-17
Payer: OTHER GOVERNMENT

## 2024-04-17 PROCEDURE — 97110 THERAPEUTIC EXERCISES: CPT

## 2024-04-17 PROCEDURE — 97140 MANUAL THERAPY 1/> REGIONS: CPT

## 2024-04-17 NOTE — PROGRESS NOTES
Time  Individual Time In: 0805       Individual Time Out: 0859  Minutes: 54  Timed Code Treatment Minutes: 54 Minutes     Electronically signed by Judit Lewis PT  on 4/17/2024 at 9:05 AM   POC NOTE

## 2024-04-19 ENCOUNTER — HOSPITAL ENCOUNTER (OUTPATIENT)
Dept: PHYSICAL THERAPY | Age: 44
Setting detail: THERAPIES SERIES
Discharge: HOME | End: 2024-04-19
Payer: OTHER GOVERNMENT

## 2024-04-19 PROCEDURE — 97110 THERAPEUTIC EXERCISES: CPT

## 2024-04-19 PROCEDURE — 97140 MANUAL THERAPY 1/> REGIONS: CPT

## 2024-04-19 ASSESSMENT — PAIN DESCRIPTION - PAIN TYPE: TYPE: CHRONIC PAIN

## 2024-04-19 ASSESSMENT — PAIN DESCRIPTION - ORIENTATION: ORIENTATION: RIGHT

## 2024-04-19 ASSESSMENT — PAIN DESCRIPTION - LOCATION: LOCATION: ARM;SHOULDER;NECK

## 2024-04-19 ASSESSMENT — PAIN SCALES - GENERAL: PAINLEVEL_OUTOF10: 1

## 2024-04-19 NOTE — PROGRESS NOTES
Physical Therapy: Daily Note   Patient: Ruth Sherman (43 y.o. female)   Examination Date: 2024  Plan of Care/Certification Expiration Date: 24    No data recorded   :  1980 # of Visits since SOC:   3   MRN: [id number]  CSN: [id number] Start of Care Date:   2024   Insurance: Payor:  [place]AST / Plan:  [place]AST / Product Type: *No Product type* /   Insurance ID: [id number] - (Other) Secondary Insurance (if applicable):    Referring Physician: Jenny Lynn MD Marissa Stewart-Jaynes, MD   PCP: Jenny Lynn MD Visits to Date/Visits Approved: 3 /  ()    No Show/Cancelled Appts:   /       Medical Diagnosis: Radiculopathy, cervical region [M54.12] Cervical radiculopathy  Treatment Diagnosis: Thoracic outlet syndrome        SUBJECTIVE EXAMINATION   Pain Level: Pain Screening  Pain Assessment: 0-10  Pain Level: 1  Pain Type: Chronic pain  Pain Location: Arm, Shoulder, Neck  Pain Orientation: Right    Patient Comments: Subjective: Patient states she had no pain after last session but today has pain in right scapular region and numbness in all fingers of her right hand.    HEP Compliance:           OBJECTIVE EXAMINATION   Restrictions:  No data recorded No data recorded No data recorded              TREATMENT     Exercises:      Treatment Reasoning    Exercise 1: CROM 1 x 10 ea  Exercise 2: Bilat upper trap stretch 4 x 15 sec ea  Exercise 3: Bilat levator stretch 4 x 15 sec ea  Exercise 4: Bilat scalene stretch 4 x 15 sec ea  Exercise 5: Chin tucks 1 x 10  Exercise 6: Slouch with overcorrection 1 x 10  Exercise 7: Thoracic extension with hands clasped behind neck 5 sec x 5  Exercise 8: Pec stretch on doorway 4 x 15 sec ea  Exercise 9: Corner stretch 4 x 15 sec ea  Exercise 10: First rib mobilization with strap/towel 4 x 15 sec ea  Exercise 11: Hold first rib mobilization, then scalene stretch 4 x 15 sec ea  Exercise 12: Standing Ws (bilat ER with scap  O-L Flap Text: The defect edges were debeveled with a #15 scalpel blade.  Given the location of the defect, shape of the defect and the proximity to free margins an O-L flap was deemed most appropriate.  Using a sterile surgical marker, an appropriate advancement flap was drawn incorporating the defect and placing the expected incisions within the relaxed skin tension lines where possible.    The area thus outlined was incised deep to adipose tissue with a #15 scalpel blade.  The skin margins were undermined to an appropriate distance in all directions utilizing iris scissors.

## 2024-04-23 RX ORDER — LAMOTRIGINE 25 MG/1
25 TABLET ORAL 2 TIMES DAILY
Qty: 30 TABLET | Refills: 3 | Status: SHIPPED | OUTPATIENT
Start: 2024-04-23

## 2024-04-24 ENCOUNTER — HOSPITAL ENCOUNTER (OUTPATIENT)
Dept: PHYSICAL THERAPY | Age: 44
Setting detail: THERAPIES SERIES
Discharge: HOME | End: 2024-04-24
Payer: OTHER GOVERNMENT

## 2024-04-24 PROCEDURE — 97140 MANUAL THERAPY 1/> REGIONS: CPT

## 2024-04-24 PROCEDURE — 97110 THERAPEUTIC EXERCISES: CPT

## 2024-04-24 NOTE — PROGRESS NOTES
Physical Therapy  Daily Treatment Note  Date: 2024  Patient Name: Ruth Sherman  MRN: [id number]     :   1980    Subjective:      PT Visit Information  PT Insurance Information:  [place]  Total # of Visits Approved:  (Anticipate 8-12)  Total # of Visits to Date: 4  Plan of Care/Certification Expiration Date: 24  Progress Note Due Date: 24  Referring Provider (secondary): Jenny Lynn MD  Subjective: Patient reports that she feels that her neck isnt as tight as it has been and that she is putting heat on it at home that seems to help. Denies pain this morning.    Pain Screening  Patient Currently in Pain: Denies       Treatment Activities:   Exercises  Exercise 1: CROM 1 x 10 ea  Exercise 2: Bilat upper trap stretch 4 x 15 sec ea  Exercise 3: Bilat levator stretch 4 x 15 sec ea  Exercise 4: Bilat scalene stretch 4 x 15 sec ea  Exercise 5: Chin tucks 1 x 10  Exercise 6: Slouch with overcorrection 1 x 10  Exercise 7: Thoracic extension with hands clasped behind neck 5 sec x 5  Exercise 8: Pec stretch on doorway 4 x 15 sec ea  Exercise 9: Corner stretch 4 x 15 sec ea  Exercise 10: First rib mobilization with strap/towel 4 x 15 sec ea  Exercise 11: Hold first rib mobilization, then scalene stretch 4 x 15 sec ea  Exercise 12: Standing Ws (bilat ER with scap squeeze and t-band) red t-band x 15  Exercise 13: Lower trap wall lifts 1 x 10  Exercise 14: Scap retraction with red t-band 1 x 15  Exercise 15: Standing Is, Ts with red t-band 1 x 15  Exercise 16: Supine on short thoracic pad x 2 min  Exercise 17: Supine manual first rib mobilization 5 sec x 5  Exercise 18: Supine manual upper trap 4 x 15 sec ea/scalene stretch 4 x 10 sec ea  Exercise 19: Supine manual cervical traction (Mechanical traction not covered by  [place]) 10 sec x 10  Exercise 20: STM/IASTM to R upper trap and scalene area x 8 min; HEP GIVEN 24     Assessment:   Conditions Requiring Skilled Therapeutic

## 2024-04-26 ENCOUNTER — HOSPITAL ENCOUNTER (OUTPATIENT)
Dept: PHYSICAL THERAPY | Age: 44
Setting detail: THERAPIES SERIES
End: 2024-04-26
Payer: OTHER GOVERNMENT

## 2024-04-26 ENCOUNTER — OFFICE VISIT (OUTPATIENT)
Dept: PRIMARY CARE CLINIC | Age: 44
End: 2024-04-26
Payer: OTHER GOVERNMENT

## 2024-04-26 VITALS
HEIGHT: 60 IN | HEART RATE: 103 BPM | DIASTOLIC BLOOD PRESSURE: 76 MMHG | RESPIRATION RATE: 18 BRPM | WEIGHT: 165.2 LBS | BODY MASS INDEX: 32.43 KG/M2 | SYSTOLIC BLOOD PRESSURE: 115 MMHG | OXYGEN SATURATION: 99 % | TEMPERATURE: 97.9 F

## 2024-04-26 DIAGNOSIS — J02.9 SORE THROAT: ICD-10-CM

## 2024-04-26 DIAGNOSIS — J06.9 VIRAL URI: Primary | ICD-10-CM

## 2024-04-26 LAB
S PYO AG THROAT QL: NORMAL
SARS-COV-2 N GENE RESP QL NAA+PROBE: NOT DETECTED

## 2024-04-26 PROCEDURE — 87880 STREP A ASSAY W/OPTIC: CPT | Performed by: FAMILY MEDICINE

## 2024-04-26 PROCEDURE — 99213 OFFICE O/P EST LOW 20 MIN: CPT | Performed by: FAMILY MEDICINE

## 2024-04-26 RX ORDER — BENZONATATE 200 MG/1
200 CAPSULE ORAL 3 TIMES DAILY PRN
Qty: 30 CAPSULE | Refills: 0 | Status: SHIPPED | OUTPATIENT
Start: 2024-04-26 | End: 2024-05-03

## 2024-04-26 SDOH — ECONOMIC STABILITY: FOOD INSECURITY: WITHIN THE PAST 12 MONTHS, THE FOOD YOU BOUGHT JUST DIDN'T LAST AND YOU DIDN'T HAVE MONEY TO GET MORE.: NEVER TRUE

## 2024-04-26 SDOH — ECONOMIC STABILITY: TRANSPORTATION INSECURITY
IN THE PAST 12 MONTHS, HAS LACK OF TRANSPORTATION KEPT YOU FROM MEETINGS, WORK, OR FROM GETTING THINGS NEEDED FOR DAILY LIVING?: NO

## 2024-04-26 SDOH — ECONOMIC STABILITY: INCOME INSECURITY: HOW HARD IS IT FOR YOU TO PAY FOR THE VERY BASICS LIKE FOOD, HOUSING, MEDICAL CARE, AND HEATING?: NOT HARD AT ALL

## 2024-04-26 SDOH — ECONOMIC STABILITY: FOOD INSECURITY: WITHIN THE PAST 12 MONTHS, YOU WORRIED THAT YOUR FOOD WOULD RUN OUT BEFORE YOU GOT MONEY TO BUY MORE.: NEVER TRUE

## 2024-04-26 ASSESSMENT — ENCOUNTER SYMPTOMS
CONSTIPATION: 0
NAUSEA: 1
BLOOD IN STOOL: 0
WHEEZING: 0
VOMITING: 1
DIARRHEA: 0
CHEST TIGHTNESS: 0
COUGH: 1
SHORTNESS OF BREATH: 0
ABDOMINAL PAIN: 0
SORE THROAT: 1

## 2024-04-26 NOTE — PROGRESS NOTES
Ruth Sherman (:  1980) is a 43 y.o. female,Established patient, here for evaluation of the following chief complaint(s):  Pharyngitis (Pt states that sore throat started on Wednesday. ) and Cough (Pt states that sore throat, cough and body aches started on Wednesday. Pt denies fever)      Assessment & Plan   ASSESSMENT/PLAN:  1. Viral URI  2. Sore throat  -     POCT rapid strep A  -     COVID-19      Strep and COVID swab obtained in office with strep swab negative at this time.  Very likely patient's symptoms are secondary to viral infection.  If COVID is positive patient will need to remain quarantine for 5 days with an additional 5 days of mask wearing to follow.  [facility]. Many illnesses are caused by viruses. These conditions usually run their course in 7-14 days.  Antibiotics do not help fight viral infections and are not needed at this time. Viral syndromes are treated with symptomatic support. You may take tylenol or ibuprofen for fever or aches and pains. Stay hydrated by taking sips of water or non caffeinated, noncarbonated, and nonalcoholic beverages throughout the day. For sore throat, you may gargle with warm salt water, use lozenges or sprays. Using a daily antihistamine such as Claritin, Zyrtec or Allegra can help with upper respiratory symptoms. Benadryl can be sedating but is helpful at drying secretions and may be taken at night. Call if you have a fever greater than 102 F or if symptoms do not improve. Your provider may also send you in prescription medications depending on your symptoms and their severity. Take all medications as directed on package unless specifically told otherwise.            Return if symptoms worsen or fail to improve.         Subjective   SUBJECTIVE/OBJECTIVE:  Ruth Sherman is a 43 y.o. female who presents due to upper respiratory symptoms.  Patient says that her symptoms have been ongoing since

## 2024-05-01 ENCOUNTER — OFFICE VISIT (OUTPATIENT)
Dept: NEUROLOGY | Age: 44
End: 2024-05-01
Payer: OTHER GOVERNMENT

## 2024-05-01 ENCOUNTER — HOSPITAL ENCOUNTER (OUTPATIENT)
Dept: PHYSICAL THERAPY | Age: 44
Setting detail: THERAPIES SERIES
Discharge: HOME | End: 2024-05-01
Payer: OTHER GOVERNMENT

## 2024-05-01 VITALS
BODY MASS INDEX: 32.39 KG/M2 | SYSTOLIC BLOOD PRESSURE: 120 MMHG | OXYGEN SATURATION: 97 % | HEART RATE: 93 BPM | HEIGHT: 60 IN | WEIGHT: 165 LBS | DIASTOLIC BLOOD PRESSURE: 88 MMHG

## 2024-05-01 DIAGNOSIS — G47.61 PERIODIC LIMB MOVEMENT DISORDER: ICD-10-CM

## 2024-05-01 DIAGNOSIS — G47.419 PRIMARY NARCOLEPSY WITHOUT CATAPLEXY: ICD-10-CM

## 2024-05-01 DIAGNOSIS — Z99.89 CPAP (CONTINUOUS POSITIVE AIRWAY PRESSURE) DEPENDENCE: ICD-10-CM

## 2024-05-01 DIAGNOSIS — G47.33 OBSTRUCTIVE SLEEP APNEA: Primary | ICD-10-CM

## 2024-05-01 DIAGNOSIS — Z79.899 MEDICATION MANAGEMENT: ICD-10-CM

## 2024-05-01 DIAGNOSIS — Z71.2 ENCOUNTER TO DISCUSS TEST RESULTS: ICD-10-CM

## 2024-05-01 DIAGNOSIS — G25.81 RESTLESS LEG SYNDROME: ICD-10-CM

## 2024-05-01 LAB
AMPHET UR QL SCN: NEGATIVE
BARBITURATES UR QL SCN: NEGATIVE
BENZODIAZ UR QL SCN: POSITIVE
BUPRENORPHINE URINE: NEGATIVE
CANNABINOIDS UR QL SCN: NEGATIVE
COCAINE UR QL SCN: NEGATIVE
DRUG SCREEN COMMENT UR-IMP: ABNORMAL
FENTANYL SCREEN, URINE: NEGATIVE
METHADONE UR QL SCN: NEGATIVE
METHAMPHETAMINE, URINE: NEGATIVE
OPIATES UR QL SCN: NEGATIVE
OXYCODONE UR QL SCN: NEGATIVE
PCP UR QL SCN: NEGATIVE
TRICYCLIC, URINE: NEGATIVE

## 2024-05-01 PROCEDURE — 97140 MANUAL THERAPY 1/> REGIONS: CPT

## 2024-05-01 PROCEDURE — 97110 THERAPEUTIC EXERCISES: CPT

## 2024-05-01 PROCEDURE — 99214 OFFICE O/P EST MOD 30 MIN: CPT | Performed by: PHYSICIAN ASSISTANT

## 2024-05-01 RX ORDER — LORAZEPAM 1 MG/1
1 TABLET ORAL EVERY 6 HOURS PRN
COMMUNITY

## 2024-05-01 RX ORDER — MODAFINIL 200 MG/1
TABLET ORAL
Qty: 60 TABLET | Refills: 2 | Status: SHIPPED | OUTPATIENT
Start: 2024-05-01 | End: 2024-07-01

## 2024-05-01 NOTE — PROGRESS NOTES
each opportunity with a mean sleep latency of 4'30\".  Four sleep onset REM periods were observed.  This MSLT is abnormal and consistent with narcolepsy without cataplexy.                                                                                                                                                                                                                                                                                            Ivan Cannon M.D., [facility]                                                                               Board certified sleep physician            Assessment:       ICD-10-CM    1. Obstructive sleep apnea  G47.33       2. Primary narcolepsy without cataplexy  G47.419 Drug SCRN, Buprenorphine     modafinil (PROVIGIL) 200 MG tablet      3. Restless leg syndrome  G25.81       4. Periodic limb movement disorder  G47.61       5. CPAP (continuous positive airway pressure) dependence  Z99.89       6. Medication management  Z79.899 Drug SCRN, Buprenorphine      7. Encounter to discuss test results  Z71.2              []  :  Stable     []  :  Improved                       []  :  Well controlled              []  :  Resolving     []  :  Resolved     [x]  :  Inadequately controlled     []  :  Worsening     []  :  Additional workup planned      Ruth Sherman is a 43 y.o. year old female pt who presents with obstructive sleep apnea, RLS, PLMD, and narcolepsy without cataplexy. She has been intolerant to the current pressure settings on APAP. The pressures were not adjusted on the present PAP post-sleep study. She has been unable to use it. She also has issues with the mask. She persists to have excessive daytime somnolence. The ESS score is 16. Will send orders to adjust the pressures to the settings recommended by Dr. Cannon. She can also try a different style of mask, if indicated. She recognizes the need for adherence to the prescribed therapy. I expressed the

## 2024-05-01 NOTE — PROGRESS NOTES
Physical Therapy: Daily Note   Patient: Ruth Sherman (43 y.o. female)   Examination Date: 2024  Plan of Care/Certification Expiration Date: 24    No data recorded   :  1980 # of Visits since SOC:   5   MRN: [id number]  CSN: [id number] Start of Care Date:   2024   Insurance: Payor: /   Insurance ID: No Subscriber Number on File Secondary Insurance (if applicable):    Referring Physician: Jenny Lynn MD Marissa Stewart-Jaynes, MD   PCP: Jenny Lynn MD Visits to Date/Visits Approved:   ()    No Show/Cancelled Appts:   /       Medical Diagnosis: Radiculopathy, cervical region [M54.12]    Treatment Diagnosis: Thoracic outlet syndrome        SUBJECTIVE EXAMINATION     Patient Comments: Subjective: Patient states she has increased neck stiffness today.  She says that she hasn't been able to work on it like she usually does and its a little stiffer. But states no pain just stiff.          OBJECTIVE EXAMINATION   Restrictions:  No data recorded No data recorded No data recorded        TREATMENT     Exercises:  Therapeutic exercise (CPT 20844)   Treatment Reasoning    Exercise 1: CROM 1 x 10 ea  Exercise 2: Bilat upper trap stretch 4 x 15 sec ea  Exercise 3: Bilat levator stretch 4 x 15 sec ea  Exercise 4: Bilat scalene stretch 4 x 15 sec ea  Exercise 5: Chin tucks 1 x 10  Exercise 6: Slouch with overcorrection 1 x 10  Exercise 7: Thoracic extension with hands clasped behind neck 5 sec x 5  Exercise 8: Pec stretch on doorway 4 x 15 sec ea  Exercise 9: Corner stretch 4 x 15 sec ea  Exercise 10: First rib mobilization with strap/towel 4 x 15 sec ea  Exercise 11: Hold first rib mobilization, then scalene stretch 4 x 15 sec ea  Exercise 12: Standing Ws (bilat ER with scap squeeze and t-band) red t-band x 15  Exercise 13: Lower trap wall lifts 1 x 10  Exercise 14: Scap retraction with red t-band 1 x 15  Exercise 15: Standing Is, Ts with red t-band 1 x

## 2024-05-01 NOTE — PATIENT INSTRUCTIONS
Patient education: Sleep apnea in adults       INTRODUCTION -- Normally during sleep, air moves through the throat and in and out of the lungs at a regular rhythm. In a person with sleep apnea, air movement is periodically diminished or stopped. There are two types of sleep apnea: obstructive sleep apnea and central sleep apnea. In obstructive sleep apnea, breathing is abnormal because of narrowing or closure of the throat. In central sleep apnea, breathing is abnormal because of a change in the breathing control and rhythm.  Sleep apnea is a serious condition that can affect a person's ability to safely perform normal daily activities and can affect long term health. Approximately 25 percent of adults are at risk for sleep apnea of some degree.  Men are more commonly affected than women. Other risk factors include middle and older age, being overweight or obese, and having a small mouth and throat.  This topic review focuses on the most common type of sleep apnea in adults, obstructive sleep apnea (JAMES).    HOW SLEEP APNEA OCCURS -- The throat is surrounded by muscles that control the airway for speaking, swallowing, and breathing. During sleep, these muscles are less active, and this causes the throat to narrow.  In most people, this narrowing does not affect breathing. In others, it can cause snoring, sometimes with reduced or completely blocked airflow.  A completely blocked airway without airflow is called an obstructive apnea. Partial obstruction with diminished airflow is called a hypopnea. A person may have apnea and hypopnea during sleep.  Insufficient breathing due to apnea or hypopnea causes oxygen levels to fall and carbon dioxide to rise. Because the airway is blocked, breathing faster or harder does not help to improve oxygen levels until the airway is reopened. Typically, the obstruction requires the person to awaken to activate the upper airway muscles. Once the airway is opened, the person then

## 2024-05-02 ENCOUNTER — TELEPHONE (OUTPATIENT)
Dept: NEUROLOGY | Age: 44
End: 2024-05-02

## 2024-05-02 NOTE — TELEPHONE ENCOUNTER
Received a call from [employer] . She voiced she received an order to change ait pressure for this patient. However [facility] does not manage this patient cpap.

## 2024-05-03 ENCOUNTER — HOSPITAL ENCOUNTER (OUTPATIENT)
Dept: PHYSICAL THERAPY | Age: 44
Setting detail: THERAPIES SERIES
End: 2024-05-03
Payer: OTHER GOVERNMENT

## 2024-05-06 ENCOUNTER — HOSPITAL ENCOUNTER (OUTPATIENT)
Dept: PHYSICAL THERAPY | Age: 44
Setting detail: THERAPIES SERIES
Discharge: HOME | End: 2024-05-06
Payer: OTHER GOVERNMENT

## 2024-05-06 PROCEDURE — 97110 THERAPEUTIC EXERCISES: CPT

## 2024-05-06 NOTE — PROGRESS NOTES
Physical Therapy: Daily Note/Reassessment   Patient: Ruth Sherman (43 y.o. female)   Examination Date: 2024  Plan of Care/Certification Expiration Date: 24    No data recorded   :  1980 # of Visits since SOC:   6   MRN: [id number]  CSN: [id number] Start of Care Date:   2024   Insurance: Payor:  EAST / Plan:  EAST SELECT / Product Type: *No Product type* /   Insurance ID: [id number] - (Other) Secondary Insurance (if applicable):    Referring Physician: Jenny Lynn MD Marissa Stewart-Jaynes, MD   PCP: Jenny Lynn MD Visits to Date/Visits Approved: 6 /      No Show/Cancelled Appts:   /       Medical Diagnosis: Radiculopathy, cervical region [M54.12]    Treatment Diagnosis: Thoracic outlet syndrome        SUBJECTIVE EXAMINATION   Pain Level: Pain Screening  Patient Currently in Pain: No    Patient Comments: Subjective: Less neck stiffness than at her last visit.  She notes that the frequency and intensity of RUE symptoms has decreased and now symptoms usually occur at night or, like yesterday, if she has been acivity with UEs (like cleaning house.)    OBJECTIVE EXAMINATION   Restrictions:  No data recorded No data recorded No data recorded      ASSESSMENT     Assessment: Assessment: Reassessment today.  Pt now with negative Adson's test.  Improved mobility of R first rib, though L still limited, though still with increase of symptoms with compression of first rib.  She continues with increased muscle tension, particularly in R scalenes, and would like to continue coming to therapy 1x/week for another month to continue with work on this and improved periscapular and upper back strength.  Will continue to benefit from skilled PT intervention to address listed impairments.  Body Structures, Functions, Activity Limitations Requiring Skilled Therapeutic Intervention: Decreased ROM, Decreased strength, Decreased high-level IADLs, Decreased ADL status,

## 2024-05-09 ENCOUNTER — HOSPITAL ENCOUNTER (OUTPATIENT)
Dept: PHYSICAL THERAPY | Age: 44
Setting detail: THERAPIES SERIES
Discharge: HOME | End: 2024-05-09
Payer: OTHER GOVERNMENT

## 2024-05-09 PROCEDURE — 97140 MANUAL THERAPY 1/> REGIONS: CPT

## 2024-05-09 PROCEDURE — 97110 THERAPEUTIC EXERCISES: CPT

## 2024-05-09 NOTE — PROGRESS NOTES
Daily Treatment Note  Date: 2024  Patient Name: Ruth Sherman  MRN: [id number]     :   1980    Referring Physician: Jenny Lynn* Jenny Lynn MD   PCP: Jenny Lynn MD    Medical Diagnosis: Radiculopathy, cervical region [M54.12] Cervical radiculopathy  Treatment Diagnosis: Thoracic outlet syndrome      Insurance: Payor:  EAST / Plan:  EAST SELECT / Product Type: *No Product type* /   Insurance ID: [id number] - (Other)    Subjective:   General  Diagnosis: Cervical radiculopathy  Referring Provider (secondary): Jenny Lynn MD    PT Insurance Information:  [facility]  Total # of Visits Approved:  (8-12)  Total # of Visits to Date: 7  Plan of Care/Certification Expiration Date: 24  Progress Note Due Date: 24  Referring Provider (secondary): Jenny Lynn MD  Subjective: i'm tired but not really in any pain  Patient Currently in Pain: No       Treatment Activities:  Exercises:      Treatment Reasoning    Exercise 1: CROM 1 x 10 ea  Exercise 2: Bilat upper trap stretch 4 x 15 sec ea  Exercise 3: Bilat levator stretch 4 x 15 sec ea  Exercise 4: Bilat scalene stretch 4 x 15 sec ea  Exercise 5: Chin tucks 1 x 10  Exercise 6: Slouch with overcorrection 1 x 10  Exercise 7: Thoracic extension with hands clasped behind neck 5 sec x 5  Exercise 8: Pec stretch on doorway 4 x 15 sec ea  Exercise 9: Corner stretch 4 x 15 sec ea  Exercise 10: First rib mobilization with strap/towel 4 x 15 sec ea  Exercise 11: Hold first rib mobilization, then scalene stretch 4 x 15 sec ea  Exercise 12: Standing Ws (bilat ER with scap squeeze and t-band) red t-band x 15  Exercise 13: Lower trap wall lifts 1 x 10  Exercise 14: Scap retraction with red t-band 1 x 15-------- Row at 90 degrees shoulder abd, then upright press and back (with t-band) red  x 10  Exercise 15: Is, Ts ---- MODIFY TO PRONE WITH DUMBBELLS AT  VISIT  1# x 10  Exercise 16: Supine on

## 2024-05-17 ENCOUNTER — HOSPITAL ENCOUNTER (OUTPATIENT)
Dept: PHYSICAL THERAPY | Age: 44
Setting detail: THERAPIES SERIES
Discharge: HOME | End: 2024-05-17
Payer: OTHER GOVERNMENT

## 2024-05-17 PROCEDURE — 97110 THERAPEUTIC EXERCISES: CPT

## 2024-05-17 PROCEDURE — 97140 MANUAL THERAPY 1/> REGIONS: CPT

## 2024-05-17 ASSESSMENT — PAIN DESCRIPTION - LOCATION: LOCATION: NECK

## 2024-05-17 NOTE — PROGRESS NOTES
t-band) red  x 10  Exercise 15: Is, Ts ---- MODIFY TO PRONE WITH DUMBBELLS AT 5/9 VISIT  1# x 10  Exercise 16: Supine on short thoracic pad x 2 min  Exercise 17: Supine manual first rib mobilization 5 sec x 5  Exercise 18: Supine manual upper trap 4 x 15 sec ea/scalene stretch 4 x 10 sec ea  Exercise 19: Supine manual cervical traction (Mechanical traction not covered by  [facility]) 10 sec x 10  Exercise 20: STM/IASTM to R upper trap and scalene area x 8 min- stm today ; HEP GIVEN 04/24/24                          Pt Education:         ASSESSMENT     Assessment: Assessment: Ms. Sherman relates continued improvement of her condition.  She performs ther ex as per flow sheet.  Body Structures, Functions, Activity Limitations Requiring Skilled Therapeutic Intervention: Decreased ROM, Decreased strength, Decreased high-level IADLs, Decreased ADL status, Increased pain, Decreased posture    Post-Treatment Pain Level:      Activity Tolerance: Patient tolerated evaluation without incident    Therapy Prognosis: Good       GOALS   Patient Goals : decrease pain and N/T in RUE  Short Term Goals Completed by 3-4 weeks Current Status Goal Status   Independent with HEP 5/6: HEP issued and pt is performing.  Notes she plans to take her things to the office so she is able to perform on a more regular basis. Partially met                                                                               Long Term Goals Completed by 4-6 weeks Current Status Goal Status   Decrease frequency/intensity of RUE N/T by at least 50% 5/6: \"It's a lot better.  It's not as frequent or as strong.  Happens mostly at night.\" In progress   Demo negative Adson's test RUE 5/6: Negative for symptom reproduction or loss of pulse. Met   Report ability to blow dry hair without any N/T in RUE 5/6: \"It hasn't done that.\" Met   Improve NPDI score to 15% impairment or better 5/6: 28% impairment.  Was 24% impairment at evaluation. In progress

## 2024-05-22 ENCOUNTER — HOSPITAL ENCOUNTER (OUTPATIENT)
Dept: PHYSICAL THERAPY | Age: 44
Setting detail: THERAPIES SERIES
Discharge: HOME | End: 2024-05-22
Payer: OTHER GOVERNMENT

## 2024-05-22 PROCEDURE — 97530 THERAPEUTIC ACTIVITIES: CPT

## 2024-05-22 ASSESSMENT — PAIN DESCRIPTION - LOCATION: LOCATION: BACK

## 2024-05-22 ASSESSMENT — PAIN SCALES - GENERAL: PAINLEVEL_OUTOF10: 6

## 2024-05-22 NOTE — PROGRESS NOTES
Physical Therapy: Daily Note/Discharge Summary   Patient: Ruth Sherman (44 y.o. female)   Examination Date: 2024  Plan of Care/Certification Expiration Date: 24    No data recorded   :  1980 # of Visits since SOC:   9   MRN: [id number]  CSN: [id number] Start of Care Date:   2024   Insurance: Payor:  EAST / Plan:  EAST SELECT / Product Type: *No Product type* /   Insurance ID: [id number] - (Other) Secondary Insurance (if applicable):    Referring Physician: Jenny Lynn MD Marissa Stewart-Jaynes, MD   PCP: Jenny Lynn MD Visits to Date/Visits Approved:   ()    No Show/Cancelled Appts:   /       Medical Diagnosis: Radiculopathy, cervical region [M54.12] Cervical radiculopathy  Treatment Diagnosis: Thoracic outlet syndrome        SUBJECTIVE EXAMINATION   Pain Level: Pain Screening  Patient Currently in Pain: Yes  Pain Level: 6  Pain Location: Back    Patient Comments: Subjective: She rates mid back pain at 6/10.  She denies recent injury.   She would like for this to be last day.  She is going to have surgery next week.    HEP Compliance: Good        OBJECTIVE EXAMINATION   Restrictions:  No data recorded No data recorded No data recorded      Hand Dominance:     Left  Right     Strength  Handle Setting 2: 49.33  Trial 1: 50  Trial 2: 50  Trial 3: 48  Average: 49.33 Handle Setting 2: 49.33  Trial 1: 50  Trial 2: 50  Trial 3: 48  Average: 49.33   Pinch Strength (if applicable)           Fine Motor Skills:            TREATMENT         Pt Education:  Neck Pain Index: 16%       ASSESSMENT     Assessment: Assessment: Ms. Sherman is seen today for last PT session.  She feels her condition has improved.  She is having less N&T of RUE.  Right  strength has improved.  NPI has improved.  She had HEP and feels she can manage on own.  Will DC PT at pt request.  Body Structures, Functions, Activity Limitations Requiring Skilled Therapeutic Intervention:

## 2024-05-24 ENCOUNTER — PRE-ADMISSION TESTING (OUTPATIENT)
Dept: PREADMISSION TESTING | Facility: HOSPITAL | Age: 44
End: 2024-05-24
Payer: OTHER GOVERNMENT

## 2024-05-24 VITALS
RESPIRATION RATE: 16 BRPM | WEIGHT: 162.04 LBS | HEART RATE: 73 BPM | BODY MASS INDEX: 32.67 KG/M2 | DIASTOLIC BLOOD PRESSURE: 81 MMHG | HEIGHT: 59 IN | OXYGEN SATURATION: 99 % | SYSTOLIC BLOOD PRESSURE: 122 MMHG

## 2024-05-24 LAB
ANION GAP SERPL CALCULATED.3IONS-SCNC: 10 MMOL/L (ref 5–15)
BUN SERPL-MCNC: 13 MG/DL (ref 6–20)
BUN/CREAT SERPL: 20.3 (ref 7–25)
CALCIUM SPEC-SCNC: 8.9 MG/DL (ref 8.6–10.5)
CHLORIDE SERPL-SCNC: 100 MMOL/L (ref 98–107)
CO2 SERPL-SCNC: 28 MMOL/L (ref 22–29)
CREAT SERPL-MCNC: 0.64 MG/DL (ref 0.57–1)
DEPRECATED RDW RBC AUTO: 41.1 FL (ref 37–54)
EGFRCR SERPLBLD CKD-EPI 2021: 111.9 ML/MIN/1.73
ERYTHROCYTE [DISTWIDTH] IN BLOOD BY AUTOMATED COUNT: 12.3 % (ref 12.3–15.4)
GLUCOSE SERPL-MCNC: 88 MG/DL (ref 65–99)
HCT VFR BLD AUTO: 43.4 % (ref 34–46.6)
HGB BLD-MCNC: 14 G/DL (ref 12–15.9)
MCH RBC QN AUTO: 29.1 PG (ref 26.6–33)
MCHC RBC AUTO-ENTMCNC: 32.3 G/DL (ref 31.5–35.7)
MCV RBC AUTO: 90.2 FL (ref 79–97)
PLATELET # BLD AUTO: 336 10*3/MM3 (ref 140–450)
PMV BLD AUTO: 9.3 FL (ref 6–12)
POTASSIUM SERPL-SCNC: 4.4 MMOL/L (ref 3.5–5.2)
RBC # BLD AUTO: 4.81 10*6/MM3 (ref 3.77–5.28)
SODIUM SERPL-SCNC: 138 MMOL/L (ref 136–145)
WBC NRBC COR # BLD AUTO: 5.24 10*3/MM3 (ref 3.4–10.8)

## 2024-05-24 PROCEDURE — 80048 BASIC METABOLIC PNL TOTAL CA: CPT

## 2024-05-24 PROCEDURE — 85027 COMPLETE CBC AUTOMATED: CPT

## 2024-05-24 PROCEDURE — 36415 COLL VENOUS BLD VENIPUNCTURE: CPT

## 2024-05-24 PROCEDURE — 93005 ELECTROCARDIOGRAM TRACING: CPT

## 2024-05-24 RX ORDER — CLOBETASOL PROPIONATE 0.5 MG/G
1 CREAM TOPICAL 2 TIMES DAILY
COMMUNITY

## 2024-05-24 RX ORDER — LAMOTRIGINE 25 MG/1
1 TABLET ORAL 2 TIMES DAILY
COMMUNITY
Start: 2024-04-23

## 2024-05-24 RX ORDER — VALACYCLOVIR HYDROCHLORIDE 1 G/1
500 TABLET, FILM COATED ORAL AS NEEDED
COMMUNITY

## 2024-05-24 RX ORDER — LORAZEPAM 1 MG/1
1 TABLET ORAL 2 TIMES DAILY PRN
COMMUNITY

## 2024-05-24 RX ORDER — OMEPRAZOLE 40 MG/1
40 CAPSULE, DELAYED RELEASE ORAL DAILY
COMMUNITY
Start: 2024-04-19

## 2024-05-24 RX ORDER — TRIAMCINOLONE ACETONIDE 1 MG/G
1 OINTMENT TOPICAL AS NEEDED
COMMUNITY

## 2024-05-24 RX ORDER — SPIRONOLACTONE 50 MG/1
50 TABLET, FILM COATED ORAL DAILY
COMMUNITY

## 2024-05-24 RX ORDER — ROPINIROLE 1 MG/1
1 TABLET, FILM COATED ORAL NIGHTLY
COMMUNITY

## 2024-05-24 RX ORDER — PRUCALOPRIDE 2 MG/1
2 TABLET, FILM COATED ORAL DAILY
COMMUNITY

## 2024-05-24 RX ORDER — MODAFINIL 200 MG/1
200 TABLET ORAL DAILY
COMMUNITY
Start: 2024-05-01 | End: 2024-07-02

## 2024-05-24 RX ORDER — ESZOPICLONE 3 MG/1
3 TABLET, FILM COATED ORAL NIGHTLY
COMMUNITY

## 2024-05-24 NOTE — DISCHARGE INSTRUCTIONS
Preparing for Surgery  Follow these instructions before the procedure:  Several days or weeks before your procedure    Ask your health care provider about:  Changing or stopping your regular medicines. This is especially important if you are taking diabetes medicines or blood thinners.  Taking medicines such as aspirin and ibuprofen. These medicines can thin your blood. Do not take these medicines unless your health care provider tells you to take them.  Taking over-the-counter medicines, vitamins, herbs, and supplements.    Contact your surgeon if you:  Develop a fever of more than 100.4°F (38°C) or other feelings of illness during the 48 hours before your surgery.  Have symptoms that get worse.  Have questions or concerns about your surgery.  If you are going home the same day of your surgery you will need to arrange for a responsible adult, age 18 years old or older, to drive you home from the hospital and stay with you for 24 hours. Verification of the  will be made prior to any procedure requiring sedation. You may not go home in a taxi or any form of public transportation by yourself.     Day before your procedure    24 hours before your procedure DO NOT drink alcoholic beverages or smoke.  Day of your procedure   You may take the following medication(s) the morning of surgery with a sip of water: ATIVAN, OMEPRAZOLE      8 hours before your procedure STOP all food, any dairy products, and full liquids. This includes hard candy, chewing gum or mints. This is extremely important to prevent serious complications.     Up to 2 hours before your scheduled arrival time, you may have clear liquids no cream, powder, or pulp of any kind. Safe options are water, black coffee, plain tea, soda, Gatorade/Powerade, clear broth, apple juice.    2 hours before your scheduled arrival time, STOP drinking clear liquids.    You may need to take another shower with a germ-killing soap before you leave home in the morning. Do  not use perfumes, colognes, or body lotions.  Wear comfortable loose-fitting clothing.  Remove all jewelry including body piercing and rings, dark colored nail polish, and make up prior to arrival at the hospital. Leave all valuables at home.   Bring your hearing aids if you rely on them.  Do not wear contact lenses. If you wear eyeglasses remember to bring a case to store them in while you are in surgery.  Do not use denture adhesives since you will be asked to remove them during your surgery.    You do not need to bring your home medications into the hospital.   Bring your sleep apnea device with you on the day of your surgery (if this applies to you).  If you wear portable oxygen, bring it with you.   If you are staying overnight, you may bring a bag of items you may need such as slippers, robe and a change of clothes for your discharge. You may want to leave these items in the car until you are ready for them since your family will take your belongings when you leave the pre-operative area.  Arrive at the hospital as scheduled by the office. You will be asked to arrive 2 hours prior to your surgery time in order to prepare for your procedure.  When you arrive at the hospital  Go to the registration desk located at the main entrance of the hospital.  After registration is completed, you will be given a beeper and a sticker sheet. Take the stickers to Outpatient Surgery and place in the tray at the end of the desk to notify the staff that you have arrived and registered.   Return to the lobby to wait. You are not always called back according to the time of arrival but rather the time your doctor will be ready.  When your beeper lights up and vibrates proceed through the double doors, under the stairs, and a member of the Outpatient Surgery staff will escort you to your preoperative room.   How to Use Chlorhexidine Before Surgery  Chlorhexidine gluconate (CHG) is a germ-killing (antiseptic) solution that is used to  clean the skin. It can get rid of the bacteria that normally live on the skin and can keep them away for about 24 hours. To clean your skin with CHG, you may be given:  A CHG solution to use in the shower or as part of a sponge bath.  A prepackaged cloth that contains CHG.  Cleaning your skin with CHG may help lower the risk for infection:  While you are staying in the intensive care unit of the hospital.  If you have a vascular access, such as a central line, to provide short-term or long-term access to your veins.  If you have a catheter to drain urine from your bladder.  If you are on a ventilator. A ventilator is a machine that helps you breathe by moving air in and out of your lungs.  After surgery.  What are the risks?  Risks of using CHG include:  A skin reaction.  Hearing loss, if CHG gets in your ears and you have a perforated eardrum.  Eye injury, if CHG gets in your eyes and is not rinsed out.  The CHG product catching fire.  Make sure that you avoid smoking and flames after applying CHG to your skin.  Do not use CHG:  If you have a chlorhexidine allergy or have previously reacted to chlorhexidine.  On babies younger than 2 months of age.  How to use CHG solution  Use CHG only as told by your health care provider, and follow the instructions on the label.  Use the full amount of CHG as directed. Usually, this is one bottle.  During a shower    Follow these steps when using CHG solution during a shower (unless your health care provider gives you different instructions):  Start the shower.  Use your normal soap and shampoo to wash your face and hair.  Turn off the shower or move out of the shower stream.  Pour the CHG onto a clean washcloth. Do not use any type of brush or rough-edged sponge.  Starting at your neck, lather your body down to your toes. Make sure you follow these instructions:  If you will be having surgery, pay special attention to the part of your body where you will be having surgery.  Scrub this area for at least 1 minute.  Do not use CHG on your head or face. If the solution gets into your ears or eyes, rinse them well with water.  Avoid your genital area.  Avoid any areas of skin that have broken skin, cuts, or scrapes.  Scrub your back and under your arms. Make sure to wash skin folds.  Let the lather sit on your skin for 1-2 minutes or as long as told by your health care provider.  Thoroughly rinse your entire body in the shower. Make sure that all body creases and crevices are rinsed well.  Dry off with a clean towel. Do not put any substances on your body afterward--such as powder, lotion, or perfume--unless you are told to do so by your health care provider. Only use lotions that are recommended by the .  Put on clean clothes or pajamas.  If it is the night before your surgery, sleep in clean sheets.     During a sponge bath  Follow these steps when using CHG solution during a sponge bath (unless your health care provider gives you different instructions):  Use your normal soap and shampoo to wash your face and hair.  Pour the CHG onto a clean washcloth.  Starting at your neck, lather your body down to your toes. Make sure you follow these instructions:  If you will be having surgery, pay special attention to the part of your body where you will be having surgery. Scrub this area for at least 1 minute.  Do not use CHG on your head or face. If the solution gets into your ears or eyes, rinse them well with water.  Avoid your genital area.  Avoid any areas of skin that have broken skin, cuts, or scrapes.  Scrub your back and under your arms. Make sure to wash skin folds.  Let the lather sit on your skin for 1-2 minutes or as long as told by your health care provider.  Using a different clean, wet washcloth, thoroughly rinse your entire body. Make sure that all body creases and crevices are rinsed well.  Dry off with a clean towel. Do not put any substances on your body  afterward--such as powder, lotion, or perfume--unless you are told to do so by your health care provider. Only use lotions that are recommended by the .  Put on clean clothes or pajamas.  If it is the night before your surgery, sleep in clean sheets.  How to use CHG prepackaged cloths  Only use CHG cloths as told by your health care provider, and follow the instructions on the label.  Use the CHG cloth on clean, dry skin.  Do not use the CHG cloth on your head or face unless your health care provider tells you to.  When washing with the CHG cloth:  Avoid your genital area.  Avoid any areas of skin that have broken skin, cuts, or scrapes.  Before surgery    Follow these steps when using a CHG cloth to clean before surgery (unless your health care provider gives you different instructions):  Using the CHG cloth, vigorously scrub the part of your body where you will be having surgery. Scrub using a back-and-forth motion for 3 minutes. The area on your body should be completely wet with CHG when you are done scrubbing.  Do not rinse. Discard the cloth and let the area air-dry. Do not put any substances on the area afterward, such as powder, lotion, or perfume.  Put on clean clothes or pajamas.  If it is the night before your surgery, sleep in clean sheets.     For general bathing  Follow these steps when using CHG cloths for general bathing (unless your health care provider gives you different instructions).  Use a separate CHG cloth for each area of your body. Make sure you wash between any folds of skin and between your fingers and toes. Wash your body in the following order, switching to a new cloth after each step:  The front of your neck, shoulders, and chest.  Both of your arms, under your arms, and your hands.  Your stomach and groin area, avoiding the genitals.  Your right leg and foot.  Your left leg and foot.  The back of your neck, your back, and your buttocks.  Do not rinse. Discard the cloth and  let the area air-dry. Do not put any substances on your body afterward--such as powder, lotion, or perfume--unless you are told to do so by your health care provider. Only use lotions that are recommended by the .  Put on clean clothes or pajamas.  Contact a health care provider if:  Your skin gets irritated after scrubbing.  You have questions about using your solution or cloth.  You swallow any chlorhexidine. Call your local poison control center (1-755.220.3266 in the [place]  Get help right away if:  Your eyes itch badly, or they become very red or swollen.  Your skin itches badly and is red or swollen.  Your hearing changes.  You have trouble seeing.  You have swelling or tingling in your mouth or throat.  You have trouble breathing.  These symptoms may represent a serious problem that is an emergency. Do not wait to see if the symptoms will go away. Get medical help right away. Call your local emergency services (179 in the [place] Do not drive yourself to the hospital.  Summary  Chlorhexidine gluconate (CHG) is a germ-killing (antiseptic) solution that is used to clean the skin. Cleaning your skin with CHG may help to lower your risk for infection.  You may be given CHG to use for bathing. It may be in a bottle or in a prepackaged cloth to use on your skin. Carefully follow your health care provider's instructions and the instructions on the product label.  Do not use CHG if you have a chlorhexidine allergy.  Contact your health care provider if your skin gets irritated after scrubbing.  This information is not intended to replace advice given to you by your health care provider. Make sure you discuss any questions you have with your health care provider.  Document Revised: 04/17/2023 Document Reviewed: 02/28/2022  Elsevier Patient Education © 2023 [employer]

## 2024-05-27 LAB
QT INTERVAL: 412 MS
QTC INTERVAL: 435 MS

## 2024-05-29 ENCOUNTER — OFFICE VISIT (OUTPATIENT)
Dept: PRIMARY CARE CLINIC | Age: 44
End: 2024-05-29
Payer: OTHER GOVERNMENT

## 2024-05-29 VITALS
SYSTOLIC BLOOD PRESSURE: 122 MMHG | DIASTOLIC BLOOD PRESSURE: 76 MMHG | HEART RATE: 78 BPM | HEIGHT: 60 IN | BODY MASS INDEX: 31.61 KG/M2 | RESPIRATION RATE: 16 BRPM | WEIGHT: 161 LBS | TEMPERATURE: 97.3 F

## 2024-05-29 DIAGNOSIS — Z13.31 POSITIVE DEPRESSION SCREENING: ICD-10-CM

## 2024-05-29 DIAGNOSIS — Z00.00 WELL WOMAN EXAM WITHOUT GYNECOLOGICAL EXAM: Primary | ICD-10-CM

## 2024-05-29 DIAGNOSIS — F41.9 ANXIETY: ICD-10-CM

## 2024-05-29 DIAGNOSIS — Z00.00 ENCOUNTER FOR WELL ADULT EXAM WITHOUT ABNORMAL FINDINGS: ICD-10-CM

## 2024-05-29 DIAGNOSIS — G47.00 INSOMNIA, UNSPECIFIED TYPE: ICD-10-CM

## 2024-05-29 LAB
ALBUMIN SERPL-MCNC: 4 G/DL (ref 3.5–5.2)
ALP SERPL-CCNC: 83 U/L (ref 35–104)
ALT SERPL-CCNC: 27 U/L (ref 5–33)
ANION GAP SERPL CALCULATED.3IONS-SCNC: 13 MMOL/L (ref 7–19)
AST SERPL-CCNC: 24 U/L (ref 5–32)
BASOPHILS # BLD: 0 K/UL (ref 0–0.2)
BASOPHILS NFR BLD: 0.6 % (ref 0–1)
BILIRUB SERPL-MCNC: 0.3 MG/DL (ref 0.2–1.2)
BUN SERPL-MCNC: 15 MG/DL (ref 6–20)
CALCIUM SERPL-MCNC: 9.2 MG/DL (ref 8.6–10)
CHLORIDE SERPL-SCNC: 105 MMOL/L (ref 98–111)
CHOLEST SERPL-MCNC: 206 MG/DL (ref 160–199)
CO2 SERPL-SCNC: 21 MMOL/L (ref 22–29)
CREAT SERPL-MCNC: 0.6 MG/DL (ref 0.5–0.9)
EOSINOPHIL # BLD: 0.2 K/UL (ref 0–0.6)
EOSINOPHIL NFR BLD: 3.1 % (ref 0–5)
ERYTHROCYTE [DISTWIDTH] IN BLOOD BY AUTOMATED COUNT: 12.7 % (ref 11.5–14.5)
GLUCOSE SERPL-MCNC: 105 MG/DL (ref 74–109)
HBA1C MFR BLD: 6 % (ref 4–6)
HCT VFR BLD AUTO: 41 % (ref 37–47)
HDLC SERPL-MCNC: 71 MG/DL (ref 65–121)
HGB BLD-MCNC: 13.6 G/DL (ref 12–16)
IMM GRANULOCYTES # BLD: 0 K/UL
LDLC SERPL CALC-MCNC: 103 MG/DL
LYMPHOCYTES # BLD: 2 K/UL (ref 1.1–4.5)
LYMPHOCYTES NFR BLD: 39.1 % (ref 20–40)
MCH RBC QN AUTO: 30.1 PG (ref 27–31)
MCHC RBC AUTO-ENTMCNC: 33.2 G/DL (ref 33–37)
MCV RBC AUTO: 90.7 FL (ref 81–99)
MONOCYTES # BLD: 0.4 K/UL (ref 0–0.9)
MONOCYTES NFR BLD: 7.7 % (ref 0–10)
NEUTROPHILS # BLD: 2.6 K/UL (ref 1.5–7.5)
NEUTS SEG NFR BLD: 49.3 % (ref 50–65)
PLATELET # BLD AUTO: 292 K/UL (ref 130–400)
PMV BLD AUTO: 10.3 FL (ref 9.4–12.3)
POTASSIUM SERPL-SCNC: 3.9 MMOL/L (ref 3.5–5)
PROT SERPL-MCNC: 7.4 G/DL (ref 6.6–8.7)
RBC # BLD AUTO: 4.52 M/UL (ref 4.2–5.4)
SODIUM SERPL-SCNC: 139 MMOL/L (ref 136–145)
TRIGL SERPL-MCNC: 162 MG/DL (ref 0–149)
TSH SERPL DL<=0.005 MIU/L-ACNC: 1.77 UIU/ML (ref 0.27–4.2)
WBC # BLD AUTO: 5.2 K/UL (ref 4.8–10.8)

## 2024-05-29 PROCEDURE — 99396 PREV VISIT EST AGE 40-64: CPT | Performed by: FAMILY MEDICINE

## 2024-05-29 RX ORDER — CITALOPRAM HYDROBROMIDE 10 MG/1
10 TABLET ORAL DAILY
Qty: 90 TABLET | Refills: 1 | Status: SHIPPED | OUTPATIENT
Start: 2024-05-29

## 2024-05-29 RX ORDER — ESZOPICLONE 3 MG/1
TABLET, FILM COATED ORAL
Qty: 30 TABLET | Refills: 0 | Status: CANCELLED | OUTPATIENT
Start: 2024-05-29 | End: 2024-06-28

## 2024-05-29 RX ORDER — ESZOPICLONE 3 MG/1
TABLET, FILM COATED ORAL
Qty: 30 TABLET | Refills: 0 | Status: SHIPPED | OUTPATIENT
Start: 2024-05-29 | End: 2024-06-29

## 2024-05-29 RX ORDER — LAMOTRIGINE 25 MG/1
25 TABLET ORAL 2 TIMES DAILY
Qty: 180 TABLET | Refills: 1 | Status: SHIPPED | OUTPATIENT
Start: 2024-05-29

## 2024-05-29 SDOH — ECONOMIC STABILITY: INCOME INSECURITY: HOW HARD IS IT FOR YOU TO PAY FOR THE VERY BASICS LIKE FOOD, HOUSING, MEDICAL CARE, AND HEATING?: NOT HARD AT ALL

## 2024-05-29 SDOH — ECONOMIC STABILITY: FOOD INSECURITY: WITHIN THE PAST 12 MONTHS, THE FOOD YOU BOUGHT JUST DIDN'T LAST AND YOU DIDN'T HAVE MONEY TO GET MORE.: NEVER TRUE

## 2024-05-29 SDOH — ECONOMIC STABILITY: FOOD INSECURITY: WITHIN THE PAST 12 MONTHS, YOU WORRIED THAT YOUR FOOD WOULD RUN OUT BEFORE YOU GOT MONEY TO BUY MORE.: NEVER TRUE

## 2024-05-29 ASSESSMENT — PATIENT HEALTH QUESTIONNAIRE - PHQ9
4. FEELING TIRED OR HAVING LITTLE ENERGY: MORE THAN HALF THE DAYS
9. THOUGHTS THAT YOU WOULD BE BETTER OFF DEAD, OR OF HURTING YOURSELF: NOT AT ALL
7. TROUBLE CONCENTRATING ON THINGS, SUCH AS READING THE NEWSPAPER OR WATCHING TELEVISION: MORE THAN HALF THE DAYS
6. FEELING BAD ABOUT YOURSELF - OR THAT YOU ARE A FAILURE OR HAVE LET YOURSELF OR YOUR FAMILY DOWN: SEVERAL DAYS
2. FEELING DOWN, DEPRESSED OR HOPELESS: MORE THAN HALF THE DAYS
3. TROUBLE FALLING OR STAYING ASLEEP: MORE THAN HALF THE DAYS
SUM OF ALL RESPONSES TO PHQ QUESTIONS 1-9: 12
SUM OF ALL RESPONSES TO PHQ9 QUESTIONS 1 & 2: 4
5. POOR APPETITE OR OVEREATING: NOT AT ALL
8. MOVING OR SPEAKING SO SLOWLY THAT OTHER PEOPLE COULD HAVE NOTICED. OR THE OPPOSITE, BEING SO FIGETY OR RESTLESS THAT YOU HAVE BEEN MOVING AROUND A LOT MORE THAN USUAL: SEVERAL DAYS
1. LITTLE INTEREST OR PLEASURE IN DOING THINGS: MORE THAN HALF THE DAYS
SUM OF ALL RESPONSES TO PHQ QUESTIONS 1-9: 12
10. IF YOU CHECKED OFF ANY PROBLEMS, HOW DIFFICULT HAVE THESE PROBLEMS MADE IT FOR YOU TO DO YOUR WORK, TAKE CARE OF THINGS AT HOME, OR GET ALONG WITH OTHER PEOPLE: SOMEWHAT DIFFICULT

## 2024-05-29 ASSESSMENT — ENCOUNTER SYMPTOMS
BACK PAIN: 0
EYE DISCHARGE: 0
COLOR CHANGE: 0
NAUSEA: 0
ABDOMINAL PAIN: 0
COUGH: 0
DIARRHEA: 0
VOMITING: 0
WHEEZING: 0

## 2024-05-29 NOTE — PROGRESS NOTES
Well Adult Note  Name: Ruth Sherman Today’s Date: 2024   MRN: [id number] Sex: Female   Age: 44 y.o. Ethnicity:  /    : 1980 Race: Other      Ruth Sherman is here for well adult exam.  History:  Patient is seen today for yearly physical and checkup.  She is fasting today for labs.  She states that she is having more trouble with anxiety.  This is been worse over the last couple of months.  She has a very stressful job.  She is currently doing Lamictal and Ativan.  She is not taking anything as far as an antidepressant currently.  She states that she did pretty well with Celexa and Pristiq previously.  She just been on them for a while so they basically quit working for her.  She has not been on 1 in the last several months.  She states that she would not be opposed to starting 1 to see if this helps with the anxiety.  It is affecting her job day in and day out as well as her daily activities.    She states that she is on Provigil which was started by neurology.  She is tolerating this well and denies any side effects to it.  She does not take it every day.  She states that she typically just takes it when she is working.  She does not feel like this is led in the anxiety because again she is not taking it every day.    Review of Systems   Constitutional:  Negative for activity change, appetite change and fever.   HENT:  Negative for congestion and nosebleeds.    Eyes:  Negative for discharge.   Respiratory:  Negative for cough and wheezing.    Cardiovascular:  Negative for chest pain and leg swelling.   Gastrointestinal:  Negative for abdominal pain, diarrhea, nausea and vomiting.   Genitourinary:  Negative for difficulty urinating, frequency and urgency.   Musculoskeletal:  Negative for back pain and gait problem.   Skin:  Negative for color change and rash.   Neurological:  Negative for dizziness and headaches.   Hematological:  Does not bruise/bleed easily.   Psychiatric/Behavioral:

## 2024-05-29 NOTE — PATIENT INSTRUCTIONS
Incorporated.   Care instructions adapted under license by [facility]. If you have questions about a medical condition or this instruction, always ask your healthcare professional. [employer] disclaims any warranty or liability for your use of this information.

## 2024-05-30 ENCOUNTER — ANESTHESIA EVENT (OUTPATIENT)
Dept: PERIOP | Facility: HOSPITAL | Age: 44
End: 2024-05-30
Payer: OTHER GOVERNMENT

## 2024-05-30 ENCOUNTER — HOSPITAL ENCOUNTER (OUTPATIENT)
Facility: HOSPITAL | Age: 44
Setting detail: HOSPITAL OUTPATIENT SURGERY
Discharge: HOME | End: 2024-05-30
Attending: PODIATRIST | Admitting: PODIATRIST
Payer: OTHER GOVERNMENT

## 2024-05-30 ENCOUNTER — ANESTHESIA (OUTPATIENT)
Dept: PERIOP | Facility: HOSPITAL | Age: 44
End: 2024-05-30
Payer: OTHER GOVERNMENT

## 2024-05-30 VITALS
RESPIRATION RATE: 16 BRPM | OXYGEN SATURATION: 99 % | SYSTOLIC BLOOD PRESSURE: 121 MMHG | TEMPERATURE: 97 F | HEART RATE: 66 BPM | DIASTOLIC BLOOD PRESSURE: 81 MMHG

## 2024-05-30 DIAGNOSIS — M95.8 OSTEOCHONDRAL DEFECT OF TALUS: Primary | ICD-10-CM

## 2024-05-30 PROCEDURE — 25810000003 LACTATED RINGERS PER 1000 ML: Performed by: PODIATRIST

## 2024-05-30 PROCEDURE — C1713 ANCHOR/SCREW BN/BN,TIS/BN: HCPCS | Performed by: PODIATRIST

## 2024-05-30 PROCEDURE — 25010000002 PROPOFOL 10 MG/ML EMULSION: Performed by: NURSE ANESTHETIST, CERTIFIED REGISTERED

## 2024-05-30 PROCEDURE — 25010000002 FENTANYL CITRATE (PF) 50 MCG/ML SOLUTION: Performed by: ANESTHESIOLOGY

## 2024-05-30 PROCEDURE — 25010000002 MIDAZOLAM PER 1 MG: Performed by: ANESTHESIOLOGY

## 2024-05-30 PROCEDURE — 25010000002 DEXAMETHASONE PER 1 MG: Performed by: NURSE ANESTHETIST, CERTIFIED REGISTERED

## 2024-05-30 PROCEDURE — 25010000002 DROPERIDOL PER 5 MG: Performed by: ANESTHESIOLOGY

## 2024-05-30 PROCEDURE — 25010000002 FENTANYL CITRATE (PF) 100 MCG/2ML SOLUTION: Performed by: NURSE ANESTHETIST, CERTIFIED REGISTERED

## 2024-05-30 PROCEDURE — 25010000002 ONDANSETRON PER 1 MG: Performed by: NURSE ANESTHETIST, CERTIFIED REGISTERED

## 2024-05-30 PROCEDURE — 25010000002 BUPIVACAINE (PF) 0.5 % SOLUTION: Performed by: ANESTHESIOLOGY

## 2024-05-30 PROCEDURE — 25010000002 GLYCOPYRROLATE 0.4 MG/2ML SOLUTION: Performed by: NURSE ANESTHETIST, CERTIFIED REGISTERED

## 2024-05-30 PROCEDURE — 25010000002 CEFAZOLIN PER 500 MG: Performed by: PODIATRIST

## 2024-05-30 DEVICE — IMPLANTABLE DEVICE: Type: IMPLANTABLE DEVICE | Site: ANKLE | Status: FUNCTIONAL

## 2024-05-30 DEVICE — ALLOGRFT ECM BIOCARTILAGE 1CC: Type: IMPLANTABLE DEVICE | Site: ANKLE | Status: FUNCTIONAL

## 2024-05-30 RX ORDER — SCOLOPAMINE TRANSDERMAL SYSTEM 1 MG/1
1 PATCH, EXTENDED RELEASE TRANSDERMAL ONCE
Status: DISCONTINUED | OUTPATIENT
Start: 2024-05-30 | End: 2024-05-30 | Stop reason: HOSPADM

## 2024-05-30 RX ORDER — HYDROMORPHONE HYDROCHLORIDE 1 MG/ML
0.5 INJECTION, SOLUTION INTRAMUSCULAR; INTRAVENOUS; SUBCUTANEOUS
Status: DISCONTINUED | OUTPATIENT
Start: 2024-05-30 | End: 2024-05-30 | Stop reason: HOSPADM

## 2024-05-30 RX ORDER — ROCURONIUM BROMIDE 10 MG/ML
INJECTION, SOLUTION INTRAVENOUS AS NEEDED
Status: DISCONTINUED | OUTPATIENT
Start: 2024-05-30 | End: 2024-05-30 | Stop reason: SURG

## 2024-05-30 RX ORDER — ACETAMINOPHEN 500 MG
1000 TABLET ORAL ONCE
Status: COMPLETED | OUTPATIENT
Start: 2024-05-30 | End: 2024-05-30

## 2024-05-30 RX ORDER — LIDOCAINE HYDROCHLORIDE 40 MG/ML
SOLUTION TOPICAL AS NEEDED
Status: DISCONTINUED | OUTPATIENT
Start: 2024-05-30 | End: 2024-05-30 | Stop reason: SURG

## 2024-05-30 RX ORDER — BUPIVACAINE HYDROCHLORIDE 5 MG/ML
INJECTION, SOLUTION EPIDURAL; INTRACAUDAL
Status: COMPLETED | OUTPATIENT
Start: 2024-05-30 | End: 2024-05-30

## 2024-05-30 RX ORDER — DROPERIDOL 2.5 MG/ML
0.62 INJECTION, SOLUTION INTRAMUSCULAR; INTRAVENOUS ONCE AS NEEDED
Status: DISCONTINUED | OUTPATIENT
Start: 2024-05-30 | End: 2024-05-30 | Stop reason: HOSPADM

## 2024-05-30 RX ORDER — FENTANYL CITRATE 50 UG/ML
INJECTION, SOLUTION INTRAMUSCULAR; INTRAVENOUS AS NEEDED
Status: DISCONTINUED | OUTPATIENT
Start: 2024-05-30 | End: 2024-05-30 | Stop reason: SURG

## 2024-05-30 RX ORDER — ONDANSETRON 2 MG/ML
INJECTION INTRAMUSCULAR; INTRAVENOUS AS NEEDED
Status: DISCONTINUED | OUTPATIENT
Start: 2024-05-30 | End: 2024-05-30 | Stop reason: SURG

## 2024-05-30 RX ORDER — DOCUSATE SODIUM 100 MG/1
100 CAPSULE, LIQUID FILLED ORAL 2 TIMES DAILY
Qty: 60 CAPSULE | Refills: 0 | Status: SHIPPED | OUTPATIENT
Start: 2024-05-30

## 2024-05-30 RX ORDER — MIDAZOLAM HYDROCHLORIDE 1 MG/ML
1 INJECTION INTRAMUSCULAR; INTRAVENOUS
Status: DISCONTINUED | OUTPATIENT
Start: 2024-05-30 | End: 2024-05-30 | Stop reason: HOSPADM

## 2024-05-30 RX ORDER — OXYCODONE AND ACETAMINOPHEN 7.5; 325 MG/1; MG/1
1 TABLET ORAL EVERY 4 HOURS PRN
Qty: 16 TABLET | Refills: 0 | Status: SHIPPED | OUTPATIENT
Start: 2024-05-30

## 2024-05-30 RX ORDER — LIDOCAINE HYDROCHLORIDE 20 MG/ML
INJECTION, SOLUTION EPIDURAL; INFILTRATION; INTRACAUDAL; PERINEURAL AS NEEDED
Status: DISCONTINUED | OUTPATIENT
Start: 2024-05-30 | End: 2024-05-30 | Stop reason: SURG

## 2024-05-30 RX ORDER — PROPOFOL 10 MG/ML
VIAL (ML) INTRAVENOUS AS NEEDED
Status: DISCONTINUED | OUTPATIENT
Start: 2024-05-30 | End: 2024-05-30 | Stop reason: SURG

## 2024-05-30 RX ORDER — GLYCOPYRROLATE 0.2 MG/ML
INJECTION INTRAMUSCULAR; INTRAVENOUS AS NEEDED
Status: DISCONTINUED | OUTPATIENT
Start: 2024-05-30 | End: 2024-05-30 | Stop reason: SURG

## 2024-05-30 RX ORDER — FENTANYL CITRATE 50 UG/ML
50 INJECTION, SOLUTION INTRAMUSCULAR; INTRAVENOUS
Status: DISCONTINUED | OUTPATIENT
Start: 2024-05-30 | End: 2024-05-30 | Stop reason: HOSPADM

## 2024-05-30 RX ORDER — DEXAMETHASONE SODIUM PHOSPHATE 4 MG/ML
INJECTION, SOLUTION INTRA-ARTICULAR; INTRALESIONAL; INTRAMUSCULAR; INTRAVENOUS; SOFT TISSUE AS NEEDED
Status: DISCONTINUED | OUTPATIENT
Start: 2024-05-30 | End: 2024-05-30 | Stop reason: SURG

## 2024-05-30 RX ORDER — DROPERIDOL 2.5 MG/ML
0.62 INJECTION, SOLUTION INTRAMUSCULAR; INTRAVENOUS ONCE AS NEEDED
Status: COMPLETED | OUTPATIENT
Start: 2024-05-30 | End: 2024-05-30

## 2024-05-30 RX ORDER — SODIUM CHLORIDE 0.9 % (FLUSH) 0.9 %
3 SYRINGE (ML) INJECTION EVERY 12 HOURS SCHEDULED
Status: DISCONTINUED | OUTPATIENT
Start: 2024-05-30 | End: 2024-05-30 | Stop reason: HOSPADM

## 2024-05-30 RX ORDER — SODIUM CHLORIDE, SODIUM LACTATE, POTASSIUM CHLORIDE, CALCIUM CHLORIDE 600; 310; 30; 20 MG/100ML; MG/100ML; MG/100ML; MG/100ML
100 INJECTION, SOLUTION INTRAVENOUS CONTINUOUS
Status: DISCONTINUED | OUTPATIENT
Start: 2024-05-30 | End: 2024-05-30 | Stop reason: HOSPADM

## 2024-05-30 RX ORDER — LIDOCAINE HYDROCHLORIDE 10 MG/ML
0.5 INJECTION, SOLUTION EPIDURAL; INFILTRATION; INTRACAUDAL; PERINEURAL ONCE AS NEEDED
Status: DISCONTINUED | OUTPATIENT
Start: 2024-05-30 | End: 2024-05-30 | Stop reason: HOSPADM

## 2024-05-30 RX ORDER — IBUPROFEN 600 MG/1
600 TABLET ORAL EVERY 6 HOURS PRN
Status: DISCONTINUED | OUTPATIENT
Start: 2024-05-30 | End: 2024-05-30 | Stop reason: HOSPADM

## 2024-05-30 RX ORDER — NALOXONE HCL 0.4 MG/ML
0.04 VIAL (ML) INJECTION AS NEEDED
Status: DISCONTINUED | OUTPATIENT
Start: 2024-05-30 | End: 2024-05-30 | Stop reason: HOSPADM

## 2024-05-30 RX ORDER — NEOSTIGMINE METHYLSULFATE 5 MG/5 ML
SYRINGE (ML) INTRAVENOUS AS NEEDED
Status: DISCONTINUED | OUTPATIENT
Start: 2024-05-30 | End: 2024-05-30 | Stop reason: SURG

## 2024-05-30 RX ORDER — LABETALOL HYDROCHLORIDE 5 MG/ML
5 INJECTION, SOLUTION INTRAVENOUS
Status: DISCONTINUED | OUTPATIENT
Start: 2024-05-30 | End: 2024-05-30 | Stop reason: HOSPADM

## 2024-05-30 RX ORDER — FENTANYL CITRATE 50 UG/ML
50 INJECTION, SOLUTION INTRAMUSCULAR; INTRAVENOUS ONCE
Status: COMPLETED | OUTPATIENT
Start: 2024-05-30 | End: 2024-05-30

## 2024-05-30 RX ORDER — NALOXONE HYDROCHLORIDE 4 MG/.1ML
SPRAY NASAL
Qty: 2 EACH | Refills: 0 | Status: SHIPPED | OUTPATIENT
Start: 2024-05-30

## 2024-05-30 RX ORDER — MAGNESIUM HYDROXIDE 1200 MG/15ML
LIQUID ORAL AS NEEDED
Status: DISCONTINUED | OUTPATIENT
Start: 2024-05-30 | End: 2024-05-30 | Stop reason: HOSPADM

## 2024-05-30 RX ORDER — SODIUM CHLORIDE 0.9 % (FLUSH) 0.9 %
3 SYRINGE (ML) INJECTION AS NEEDED
Status: DISCONTINUED | OUTPATIENT
Start: 2024-05-30 | End: 2024-05-30 | Stop reason: HOSPADM

## 2024-05-30 RX ORDER — SODIUM CHLORIDE 0.9 % (FLUSH) 0.9 %
3-10 SYRINGE (ML) INJECTION AS NEEDED
Status: DISCONTINUED | OUTPATIENT
Start: 2024-05-30 | End: 2024-05-30 | Stop reason: HOSPADM

## 2024-05-30 RX ORDER — ONDANSETRON 4 MG/1
4 TABLET, ORALLY DISINTEGRATING ORAL EVERY 4 HOURS PRN
Qty: 16 TABLET | Refills: 0 | Status: SHIPPED | OUTPATIENT
Start: 2024-05-30

## 2024-05-30 RX ORDER — MIDAZOLAM HYDROCHLORIDE 1 MG/ML
2 INJECTION INTRAMUSCULAR; INTRAVENOUS ONCE
Status: COMPLETED | OUTPATIENT
Start: 2024-05-30 | End: 2024-05-30

## 2024-05-30 RX ORDER — ONDANSETRON 2 MG/ML
4 INJECTION INTRAMUSCULAR; INTRAVENOUS
Status: DISCONTINUED | OUTPATIENT
Start: 2024-05-30 | End: 2024-05-30 | Stop reason: HOSPADM

## 2024-05-30 RX ORDER — OXYCODONE AND ACETAMINOPHEN 10; 325 MG/1; MG/1
1 TABLET ORAL EVERY 4 HOURS PRN
Status: DISCONTINUED | OUTPATIENT
Start: 2024-05-30 | End: 2024-05-30 | Stop reason: HOSPADM

## 2024-05-30 RX ORDER — SODIUM CHLORIDE 9 MG/ML
40 INJECTION, SOLUTION INTRAVENOUS AS NEEDED
Status: DISCONTINUED | OUTPATIENT
Start: 2024-05-30 | End: 2024-05-30 | Stop reason: HOSPADM

## 2024-05-30 RX ORDER — FLUMAZENIL 0.1 MG/ML
0.2 INJECTION INTRAVENOUS AS NEEDED
Status: DISCONTINUED | OUTPATIENT
Start: 2024-05-30 | End: 2024-05-30 | Stop reason: HOSPADM

## 2024-05-30 RX ADMIN — SCOPALAMINE 1 PATCH: 1 PATCH, EXTENDED RELEASE TRANSDERMAL at 07:34

## 2024-05-30 RX ADMIN — PROPOFOL INJECTABLE EMULSION 150 MCG/KG/MIN: 10 INJECTION, EMULSION INTRAVENOUS at 09:16

## 2024-05-30 RX ADMIN — DROPERIDOL 0.62 MG: 2.5 INJECTION, SOLUTION INTRAMUSCULAR; INTRAVENOUS at 07:37

## 2024-05-30 RX ADMIN — MIDAZOLAM 2 MG: 1 INJECTION INTRAMUSCULAR; INTRAVENOUS at 07:35

## 2024-05-30 RX ADMIN — LIDOCAINE HYDROCHLORIDE 1 EACH: 40 SOLUTION TOPICAL at 08:08

## 2024-05-30 RX ADMIN — PROPOFOL INJECTABLE EMULSION 150 MCG/KG/MIN: 10 INJECTION, EMULSION INTRAVENOUS at 08:09

## 2024-05-30 RX ADMIN — SODIUM CHLORIDE 1000 MG: 900 INJECTION INTRAVENOUS at 08:21

## 2024-05-30 RX ADMIN — ONDANSETRON 4 MG: 2 INJECTION INTRAMUSCULAR; INTRAVENOUS at 09:34

## 2024-05-30 RX ADMIN — SODIUM CHLORIDE, POTASSIUM CHLORIDE, SODIUM LACTATE AND CALCIUM CHLORIDE: 600; 310; 30; 20 INJECTION, SOLUTION INTRAVENOUS at 08:47

## 2024-05-30 RX ADMIN — FENTANYL CITRATE 100 MCG: 50 INJECTION, SOLUTION INTRAMUSCULAR; INTRAVENOUS at 08:04

## 2024-05-30 RX ADMIN — PROPOFOL 200 MG: 10 INJECTION, EMULSION INTRAVENOUS at 08:07

## 2024-05-30 RX ADMIN — FENTANYL CITRATE 50 MCG: 50 INJECTION, SOLUTION INTRAMUSCULAR; INTRAVENOUS at 07:35

## 2024-05-30 RX ADMIN — SODIUM CHLORIDE, POTASSIUM CHLORIDE, SODIUM LACTATE AND CALCIUM CHLORIDE 100 ML/HR: 600; 310; 30; 20 INJECTION, SOLUTION INTRAVENOUS at 07:03

## 2024-05-30 RX ADMIN — BUPIVACAINE HYDROCHLORIDE 20 ML: 5 INJECTION, SOLUTION EPIDURAL; INTRACAUDAL; PERINEURAL at 07:37

## 2024-05-30 RX ADMIN — Medication 3 MG: at 09:34

## 2024-05-30 RX ADMIN — GLYCOPYRROLATE 0.4 MG: 0.2 INJECTION INTRAMUSCULAR; INTRAVENOUS at 09:34

## 2024-05-30 RX ADMIN — ROCURONIUM BROMIDE 20 MG: 10 INJECTION, SOLUTION INTRAVENOUS at 08:07

## 2024-05-30 RX ADMIN — ACETAMINOPHEN 1000 MG: 500 TABLET, FILM COATED ORAL at 07:33

## 2024-05-30 RX ADMIN — DEXAMETHASONE SODIUM PHOSPHATE 8 MG: 4 INJECTION, SOLUTION INTRAMUSCULAR; INTRAVENOUS at 08:21

## 2024-05-30 RX ADMIN — LIDOCAINE HYDROCHLORIDE 100 MG: 20 INJECTION, SOLUTION EPIDURAL; INFILTRATION; INTRACAUDAL; PERINEURAL at 08:07

## 2024-05-30 NOTE — OP NOTE
ANKLE ARTHROSCOPY  Procedure Note    Crystal Waldrop  5/30/2024    Pre-op Diagnosis:   Osteochondral defect talus right, joint derangement ankle right, right ankle pain    Post-op Diagnosis:     Post-Op Diagnosis Codes:     * Osteochondral defect of talus [M95.8]     * Joint derangement of ankle or foot [M24.173, M24.176]     * Right ankle pain [M25.571]     Procedure/CPT Codes:       Procedure(s):  ANKLE ARTHROSCOPY WITH EXTENSIVE DEBRIDEMENT AND MICRO FRACTURE TALUS WITH CARTILAGE GRAFT, RIGHT ANKLE    Surgeon(s):  Randy Collado DPM    Anesthesia: General with Block    Staff:   Circulator: Viki Renner RN; Christina Dunlap RN  Scrub Person: Sameer Morton; Dipti Posey  Vendor Representative: Leon Rios; Petra Mendes     was responsible for performing the following activities:  Help with handling camera and instrumentation.  and their skilled assistance was necessary for the success of this case.    Indications for procedure:  Pain.    Procedure details:  Patient brought the operating placed under general anesthesia.  She did have a preoperative regional block per anesthesia preoperatively area.  Right leg prepped and draped in usual sterile fashion.  Following procedures were performed.    Procedure #1ANKLE ARTHROSCOPY WITH EXTENSIVE DEBRIDEMENT AND MICRO FRACTURE TALUS WITH CARTILAGE GRAFT, RIGHT ANKLE    The right ankle was exsanguinated and placed under distraction.  An anteromedial portal was created.  The trocar cannula was then introduced.  The camera was then introduced and the joint was visualized.  A lateral portal was created under direct visualization.  The shaver was then introduced and the anterior soft tissue impingement which was significant was resected.  The medial talar dome was inspected there was a significant articular cartilage defect that was mainly on the medial shoulder of the talus.  This cartilage was elevated but it could not be debrided with a curette from the  lateral portal.  There was a small thin area of cartilage at the distal tibia dorsal to the but there was no loose cartilage.  The camera was then inserted laterally and the defect was visualized.  Curette was used to remove all loose cartilage.  The shaver was then used to collect all loose cartilage.  The borders of the lesion were stabilized with the thermal wand.  Microfracture was then performed with the smart shot microfracture device.  The joint was then suctioned dry.  An anterior fat pad retractor was placed.  The camera was then placed back into the joint and the defect was visualized.  Arthrex bio cartilage was then placed into the lesion.  It was remodeled with a [place].  Fibrin glue was then placed over the cartilage.  Care was then removed.  This was left to sit for 8 minutes.  Once that time had passed the distraction device was removed and the ankle was placed through range of motion.  A well-padded compression bandage with posterior splint was applied.    Estimated Blood Loss: none    Specimens:                None      Drains: * No LDAs found *    Implants:   Implant Name Type Inv. Item Serial No.  Lot No. LRB No. Used Action   KT SEALANT VISTASEAL HUMN/FIBRIN 4ML 12/KT - [id number] Implant KT SEALANT VISTASEAL HUMN/FIBRIN 4ML 12/KT  [employer]  DIV OF J AND J [id number] Right 1 Implanted   ALLOGRFT ECM BIOCARTILAGE 1CC - [id number] Implant ALLOGRFT ECM BIOCARTILAGE 1CC  ARTHREX [id number] Right 1 Implanted        Complications: none           Follow up:   Nonweightbearing.  3 to 5 days for follow-up.  Prescriptions for Percocet and Zofran were given.    Randy Collado DPM     Date: 5/30/2024  Time: 09:52 CDT

## 2024-05-30 NOTE — ANESTHESIA POSTPROCEDURE EVALUATION
Patient: Crystal Waldrop    Procedure Summary       Date: 05/30/24 Room / Location: [facility] OR  /  [facility] OR    Anesthesia Start: 0804 Anesthesia Stop: 0947    Procedure: ANKLE ARTHROSCOPY WITH EXTENSIVE DEBRIDEMENT AND MICRO FRACTURE TALUS WITH CARTILAGE GRAFT, RIGHT ANKLE (Right: Ankle) Diagnosis:       Osteochondral defect of talus      Joint derangement of ankle or foot      Right ankle pain      (Osteochondral defect talus right, joint derangement ankle right, right ankle pain)    Surgeons: Randy Collado DPM Provider: Randy Briceno CRNA    Anesthesia Type: general with block ASA Status: 2            Anesthesia Type: general with block    Vitals  Vitals Value Taken Time   /67 05/30/24 1045   Temp 97 °F (36.1 °C) 05/30/24 1029   Pulse 64 05/30/24 1045   Resp 16 05/30/24 1045   SpO2 100 % 05/30/24 1045           Post Anesthesia Care and Evaluation    Patient location during evaluation: PACU  Patient participation: complete - patient participated  Level of consciousness: awake and alert  Pain management: adequate    Airway patency: patent  Anesthetic complications: No anesthetic complications    Cardiovascular status: acceptable  Respiratory status: acceptable  Hydration status: acceptable    Comments: Blood pressure 104/67, pulse 64, temperature 97 °F (36.1 °C), resp. rate 16, SpO2 100%, not currently breastfeeding.    Pt discharged from PACU based on charanjit score >8

## 2024-05-30 NOTE — ANESTHESIA PREPROCEDURE EVALUATION
Anesthesia Evaluation     Patient summary reviewed   history of anesthetic complications:  PONV               Airway   Mallampati: III  Dental      Pulmonary    (+) ,sleep apnea on CPAP  Cardiovascular - negative cardio ROS        Neuro/Psych  GI/Hepatic/Renal/Endo    (+) obesity, GERD    Musculoskeletal     Abdominal   (+) obese   Substance History      OB/GYN          Other   autoimmune disease lupus,                       Anesthesia Plan    ASA 2     general with block     (Multimodal ponv ppx )  intravenous induction     Anesthetic plan, risks, benefits, and alternatives have been provided, discussed and informed consent has been obtained with: patient.        CODE STATUS:

## 2024-05-30 NOTE — DISCHARGE INSTRUCTIONS
What to expect after a Nerve Block  Nerve blocks administered to block pain affect many types of nerves, including those nerves that control movement, pain, and normal sensation.  Following a nerve block, you may notice some bruising at the site where the block was given.  You may experience sensations such as:  numbness of the affected area or limb, tingling, heaviness (that is the limb feels heavy to you), weakness or inability to move the affected arm or leg, or a feeling as if your arm or leg has “fallen asleep”.    A nerve block can last from 9-18 hours depending on the medications used. Certain medications can last up to 72 hours, your anesthesiologist may have discussed this with you pre-op. Usually the weakness wears off first followed by the tingling and heaviness.  As the block wears off, you may begin to notice pain; however, this sequence of events may occur in any order.  Typically, you will be able to move your limb before you will feel it.  Once a nerve block begins to wear off, the effects are usually completely gone within 60 minutes.    If you experience continued side effects that you believe are block related, please call your healthcare provider.  Please see block-specific instructions below.    Instructions for any Block involving the leg/foot:  If you have had a leg/foot block, you should not bear weight on the affected leg until the block has worn off.  After the block has worn off, weight bearing should be as directed by your surgeon.  You may be sent home with crutches.  You are at high risk for falling because of the anesthetic effects on your leg.  Please use caution when standing or trying to move or walk.  Have someone assist you until your leg and foot function have returned to normal.    Protection of a “blocked” limb  After a nerve block, you cannot feel pain, pressure, or extremes of temperature in the affected limb.  And because of this, your blocked limb is at more risk for  injury.  For example, it is possible to burn your limb on an extremely hot surface without feeling it.  When resting, it is important to reposition your limb periodically to avoid prolonged pressure on it.  This may require the use of pillows and padding.  While sleeping, you should avoid rolling onto the affected limb or putting too much pressure on it.  If you have a cast or tight dressing, check the color of your toes of the affected limb.  Call your surgeon if they look discolored (that is, dusky, dark colored)  Use caution in cold weather.  Cover your limb appropriately to protect it from the cold.  Pain Management  Your surgeon will give you a prescription for pain medication.  Begin taking this before the nerve block wears off.  Bear in mind that sometimes the block can wear off in the middle of the night.

## 2024-05-30 NOTE — ANESTHESIA PROCEDURE NOTES
Airway  Urgency: elective    Date/Time: 5/30/2024 8:09 AM  Airway not difficult    General Information and Staff    Patient location during procedure: OR  CRNA/CAA: Randy Briceno CRNA    Indications and Patient Condition  Indications for airway management: airway protection    Preoxygenated: yes  Mask difficulty assessment: 1 - vent by mask    Final Airway Details  Final airway type: endotracheal airway      Successful airway: ETT  Cuffed: yes   Successful intubation technique: direct laryngoscopy  Endotracheal tube insertion site: oral  Blade: Quintanilla  Blade size: 2  ETT size (mm): 7.0  Cormack-Lehane Classification: grade I - full view of glottis  Placement verified by: chest auscultation and capnometry   Cuff volume (mL): 6  Measured from: lips  ETT/EBT  to lips (cm): 21  Number of attempts at approach: 1  Assessment: lips, teeth, and gum same as pre-op and atraumatic intubation

## 2024-05-30 NOTE — ANESTHESIA PROCEDURE NOTES
Peripheral Block    Pre-sedation assessment completed: 5/30/2024 7:33 AM    Patient reassessed immediately prior to procedure    Patient location during procedure: holding area  Start time: 5/30/2024 7:36 AM  Stop time: 5/30/2024 7:37 AM  Reason for block: procedure for pain, at surgeon's request, post-op pain management and Requested by Dr. Collado  Performed by  Anesthesiologist: Jose Alberto Villafana MD  Preanesthetic Checklist  Completed: patient identified, IV checked, site marked, risks and benefits discussed, surgical consent, monitors and equipment checked, pre-op evaluation and timeout performed  Prep:  Pt Position: supine  Sterile barriers:mask, gloves and cap  Prep: ChloraPrep  Patient monitoring: blood pressure monitoring, continuous pulse oximetry and EKG  Procedure    Sedation: yes  Performed under: MAC  Guidance:ultrasound guided and Sciatic nerve identified and local anesthetic seen surrounding nerve    ULTRASOUND INTERPRETATION.  Using ultrasound guidance a 20 G gauge needle was placed in close proximity to the sciatic nerve, at which point, under ultrasound guidance anesthetic was injected in the area of the nerve and spread of the anesthesia was seen on ultrasound in close proximity thereto.  There were no abnormalities seen on ultrasound; a digital image was taken; and the patient tolerated the procedure with no complications. Images:still images obtained (picture printed and placed in patients chart)    Laterality:right  Block Type:sciatic and popliteal  Injection Technique:single-shot  Needle Type:echogenic  Needle Gauge:20 G  Resistance on Injection: none    Medications Used: bupivacaine PF (MARCAINE) injection 0.5% - Injection   20 mL - 5/30/2024 7:37:00 AM      Post Assessment  Injection Assessment: negative aspiration for heme, no paresthesia on injection and incremental injection  Patient Tolerance:comfortable throughout block  Complications:no

## 2024-05-31 ENCOUNTER — APPOINTMENT (OUTPATIENT)
Dept: PHYSICAL THERAPY | Age: 44
End: 2024-05-31
Payer: OTHER GOVERNMENT

## 2024-05-31 PROBLEM — Z71.2 ENCOUNTER TO DISCUSS TEST RESULTS: Status: RESOLVED | Noted: 2024-05-01 | Resolved: 2024-05-31

## 2024-06-28 RX ORDER — CLOBETASOL PROPIONATE 0.05 G/100ML
SHAMPOO TOPICAL
Qty: 118 ML | Refills: 0 | Status: SHIPPED | OUTPATIENT
Start: 2024-06-28

## 2024-08-09 DIAGNOSIS — G47.00 INSOMNIA, UNSPECIFIED TYPE: ICD-10-CM

## 2024-08-09 NOTE — TELEPHONE ENCOUNTER
Ruth Sherman called to request a refill on her medication.      Last office visit : 5/29/2024   Next office visit : 12/3/2024     Last UDS:   Benzodiazepine Screen, Urine   Date Value Ref Range Status   05/01/2024 POSITIVE (A) Negative <150 ng/mL Final     Buprenorphine Urine   Date Value Ref Range Status   05/01/2024 Negative Negative <10 ng/mL Final     Cocaine Metabolite Screen, Urine   Date Value Ref Range Status   05/01/2024 Negative Negative <150 ng/mL Final     Gabapentin Screen, Urine   Date Value Ref Range Status   03/13/2023 not tested  Final     Oxycodone Screen, Ur   Date Value Ref Range Status   03/13/2023 n  Final     Propoxyphene Screen, Urine   Date Value Ref Range Status   03/13/2023 not tested  Final     THC Screen, Urine   Date Value Ref Range Status   03/13/2023 n  Final     Tricyclic Antidepressants, Urine   Date Value Ref Range Status   05/01/2024 Negative Negative <300 ng/mL Final         Medication Contract: 5/10/21   Last Fill: 2 months ago (5/29/2024) by Jenny Lynn MD     Requested Prescriptions     Pending Prescriptions Disp Refills    eszopiclone (LUNESTA) 3 MG TABS [Pharmacy Med Name: ESZOPICLONE 3MG TABLETS] 30 tablet      Sig: TAKE 1 TABLET BY MOUTH EVERY NIGHT. MAX DAILY AMOUNT: 3 MG         Please approve or refuse this medication.   Lina Ramey LPN

## 2024-08-12 RX ORDER — ESZOPICLONE 3 MG/1
TABLET, FILM COATED ORAL
Qty: 30 TABLET | Refills: 1 | Status: SHIPPED | OUTPATIENT
Start: 2024-08-12 | End: 2024-09-12

## 2024-08-13 ENCOUNTER — OFFICE VISIT (OUTPATIENT)
Dept: NEUROLOGY | Age: 44
End: 2024-08-13
Payer: OTHER GOVERNMENT

## 2024-08-13 VITALS
WEIGHT: 160 LBS | DIASTOLIC BLOOD PRESSURE: 82 MMHG | HEIGHT: 60 IN | OXYGEN SATURATION: 98 % | BODY MASS INDEX: 31.41 KG/M2 | SYSTOLIC BLOOD PRESSURE: 121 MMHG | HEART RATE: 83 BPM

## 2024-08-13 DIAGNOSIS — G47.419 PRIMARY NARCOLEPSY WITHOUT CATAPLEXY: ICD-10-CM

## 2024-08-13 DIAGNOSIS — R40.0 SOMNOLENCE, DAYTIME: ICD-10-CM

## 2024-08-13 DIAGNOSIS — G47.61 PERIODIC LIMB MOVEMENT DISORDER: ICD-10-CM

## 2024-08-13 DIAGNOSIS — G47.33 OBSTRUCTIVE SLEEP APNEA: Primary | ICD-10-CM

## 2024-08-13 DIAGNOSIS — Z99.89 CPAP (CONTINUOUS POSITIVE AIRWAY PRESSURE) DEPENDENCE: ICD-10-CM

## 2024-08-13 DIAGNOSIS — Z79.899 MEDICATION MANAGEMENT: ICD-10-CM

## 2024-08-13 DIAGNOSIS — G25.81 RESTLESS LEG SYNDROME: ICD-10-CM

## 2024-08-13 PROCEDURE — 99213 OFFICE O/P EST LOW 20 MIN: CPT | Performed by: PHYSICIAN ASSISTANT

## 2024-08-13 RX ORDER — MODAFINIL 200 MG/1
TABLET ORAL
COMMUNITY
Start: 2024-08-07

## 2024-08-13 NOTE — PROGRESS NOTES
[facility] Neurology and Sleep Medicine  [facility]  [place] [address]  Phone [phone]  Fax [phone]       [facility] Sleep Follow Up Encounter      Information:   Patient Name: Ruth Sherman  :   1980  Age:   44 y.o.  MRN:   [id number]  Account #:  [id number]  Today:                24    Provider:  Heather Stacy PA-C    Chief Complaint   Patient presents with    Follow-up    Sleep Apnea     Patient wants to discuss whether or not she truly needs a PAP.         Subjective:   Ruth Sherman is a 44 y.o. female who has  has a past medical history of Anxiety, Bell's palsy, Chronic migraine without aura, intractable, without status migrainosus, CPAP (continuous positive airway pressure) dependence, Depression, Headache, Insomnia, Intractable migraine without aura and without status migrainosus, Irritable bowel syndrome, Kidney stone, Narcolepsy without cataplexy, JAMES (obstructive sleep apnea), Osteoarthritis, Periodic limb movement disorder (PLMD), and Snoring.    Ruth Sherman comes in for a sleep clinic follow up. We manage JAMES on CPAP and narcolepsy. She was prescribed modafinil 200 mg at her last office visit. She also has a hx of PLMD/RLS stable with ropinirole prescribed by Dr. Jenny Lynn.     Today she reports that she is still intolerant to current PAP pressure. [facility] would not adjust the pressure setting because she obtained the device [place]. She is unable to tolerate the current pressure setting. She persists to have excessive daytime somnolence. The ESS score is 13. She only takes the modafinil if she is extremely tired when she wakes up. She has noted some anxiety with it but it is tolerable.      Objective:     Past Medical History:   Diagnosis Date    Anxiety     Bell's palsy     Chronic migraine without aura, intractable, without status migrainosus 08/10/2017    CPAP (continuous positive airway pressure) dependence     Depression     Headache     Insomnia

## 2024-10-03 RX ORDER — CLOBETASOL PROPIONATE 0.05 G/100ML
SHAMPOO TOPICAL
Qty: 118 ML | Refills: 0 | Status: SHIPPED | OUTPATIENT
Start: 2024-10-03

## 2024-10-21 ENCOUNTER — OFFICE VISIT (OUTPATIENT)
Dept: PRIMARY CARE CLINIC | Age: 44
End: 2024-10-21
Payer: OTHER GOVERNMENT

## 2024-10-21 VITALS
RESPIRATION RATE: 16 BRPM | HEIGHT: 60 IN | BODY MASS INDEX: 31.41 KG/M2 | DIASTOLIC BLOOD PRESSURE: 76 MMHG | HEART RATE: 85 BPM | SYSTOLIC BLOOD PRESSURE: 124 MMHG | TEMPERATURE: 97.1 F | OXYGEN SATURATION: 99 % | WEIGHT: 160 LBS

## 2024-10-21 DIAGNOSIS — L20.82 FLEXURAL ECZEMA: Primary | ICD-10-CM

## 2024-10-21 PROCEDURE — 99213 OFFICE O/P EST LOW 20 MIN: CPT | Performed by: FAMILY MEDICINE

## 2024-10-21 RX ORDER — CETIRIZINE HYDROCHLORIDE 10 MG/1
10 TABLET ORAL DAILY
Qty: 30 TABLET | Refills: 0 | Status: SHIPPED | OUTPATIENT
Start: 2024-10-21

## 2024-10-21 RX ORDER — ESZOPICLONE 3 MG/1
TABLET, FILM COATED ORAL
COMMUNITY
Start: 2024-10-03

## 2024-10-21 ASSESSMENT — ENCOUNTER SYMPTOMS
WHEEZING: 0
DIARRHEA: 0
COUGH: 0
COLOR CHANGE: 0
ABDOMINAL PAIN: 0
VOMITING: 0
EYE DISCHARGE: 0
NAUSEA: 0
BACK PAIN: 0

## 2024-10-21 NOTE — PROGRESS NOTES
SUBJECTIVE:    Patient ID: Ruth Sherman is a 44 y.o. female.    HPI:   Patient is seen today for complaints of a rash in her elbow folds and on her chin.  She states that it has been there for a few weeks.  She does have problems with her scalp getting irritated and she states that it is also been itchy as well but she did have some clobetasol shampoo and clobetasol ointment that she has been using on it.  She states that she has not had any other new exposures that she is aware of.  She is not currently taking a daily antihistamine    Past Medical History:   Diagnosis Date    Anxiety     Bell's palsy     Chronic migraine without aura, intractable, without status migrainosus 08/10/2017    CPAP (continuous positive airway pressure) dependence     Depression     Headache     Insomnia     Intractable migraine without aura and without status migrainosus 08/10/2017    Irritable bowel syndrome     Kidney stone 10/2017    Narcolepsy without cataplexy     JAMES (obstructive sleep apnea)     Osteoarthritis     Periodic limb movement disorder (PLMD)     Snoring     PSG, 7/2016-mild      Current Outpatient Medications on File Prior to Visit   Medication Sig Dispense Refill    eszopiclone 3 MG TABS       Clobetasol Propionate 0.05 % SHAM APPLY TOPICALLY TWICE A WEEK 118 mL 0    modafinil (PROVIGIL) 200 MG tablet       lamoTRIgine (LAMICTAL) 25 MG tablet Take 1 tablet by mouth 2 times daily 180 tablet 1    citalopram (CELEXA) 10 MG tablet Take 1 tablet by mouth daily 90 tablet 1    LORazepam (ATIVAN) 1 MG tablet Take 1 tablet by mouth every 6 hours as needed for Anxiety.      triamcinolone (ARISTOCORT) 0.5 % ointment Apply topically 2 times daily Apply topically 2 times daily.      rOPINIRole (REQUIP) 1 MG tablet TAKE 1 TABLET THREE TIMES A  tablet 3    valACYclovir (VALTREX) 1 g tablet Take 2 tablets by mouth 2 times daily as needed (PRN) Indications: COLD SORES 90 tablet 3    clobetasol (TEMOVATE) 0.05 % cream

## 2024-10-28 ENCOUNTER — OFFICE VISIT (OUTPATIENT)
Dept: PRIMARY CARE CLINIC | Age: 44
End: 2024-10-28
Payer: OTHER GOVERNMENT

## 2024-10-28 VITALS
WEIGHT: 157 LBS | TEMPERATURE: 97.9 F | DIASTOLIC BLOOD PRESSURE: 74 MMHG | BODY MASS INDEX: 30.82 KG/M2 | OXYGEN SATURATION: 98 % | HEIGHT: 60 IN | HEART RATE: 88 BPM | SYSTOLIC BLOOD PRESSURE: 110 MMHG | RESPIRATION RATE: 16 BRPM

## 2024-10-28 DIAGNOSIS — J06.9 URI, ACUTE: Primary | ICD-10-CM

## 2024-10-28 DIAGNOSIS — J02.9 SORE THROAT: ICD-10-CM

## 2024-10-28 LAB — S PYO AG THROAT QL: NORMAL

## 2024-10-28 PROCEDURE — 87880 STREP A ASSAY W/OPTIC: CPT | Performed by: FAMILY MEDICINE

## 2024-10-28 PROCEDURE — 99213 OFFICE O/P EST LOW 20 MIN: CPT | Performed by: FAMILY MEDICINE

## 2024-10-28 ASSESSMENT — ENCOUNTER SYMPTOMS
COUGH: 0
EYE DISCHARGE: 0
BACK PAIN: 0
VOMITING: 0
COLOR CHANGE: 0
DIARRHEA: 0
NAUSEA: 0
ABDOMINAL PAIN: 0
SORE THROAT: 1
WHEEZING: 0

## 2024-10-28 NOTE — PROGRESS NOTES
[facility] REQUISITION FORM     [employer] Client ID # [id number]    CLIENT ADDRESS:  [address]         [place] [address] [phone]                      ORDERING PROVIDER: Jenny Lynn    PATIENT: Ruth Sherman    GENDER: female    : 1980    PANEL ORDERED:    FLU PLUS     SOURCE OF SPECIMEN: specimensource: Nasopharyngeal     DATE of COLLECTION: 10/28/24    DIAGNOSIS CODE: Sore throat J02.9

## 2024-10-28 NOTE — PROGRESS NOTES
SUBJECTIVE:    Patient ID: Ruth Sherman is a 44 y.o. female.    HPI:   Patient is seen today for complaints of a sore throat since this morning.  She states that it came on pretty suddenly is worsened as the day is gone on.  She also is very achy and tired.  She states that she sweated a lot last night throughout the night.  She states that she does have some sinus pressure.  She states that she has not had any known sick contacts.  She denies any rash.  She has not had any vomiting or diarrhea    Past Medical History:   Diagnosis Date    Anxiety     Bell's palsy     Chronic migraine without aura, intractable, without status migrainosus 08/10/2017    CPAP (continuous positive airway pressure) dependence     Depression     Headache     Insomnia     Intractable migraine without aura and without status migrainosus 08/10/2017    Irritable bowel syndrome     Kidney stone 10/2017    Narcolepsy without cataplexy     JAMES (obstructive sleep apnea)     Osteoarthritis     Periodic limb movement disorder (PLMD)     Snoring     PSG, 7/2016-mild      Current Outpatient Medications on File Prior to Visit   Medication Sig Dispense Refill    eszopiclone 3 MG TABS       cetirizine (ZYRTEC) 10 MG tablet Take 1 tablet by mouth daily 30 tablet 0    Clobetasol Propionate 0.05 % SHAM APPLY TOPICALLY TWICE A WEEK 118 mL 0    modafinil (PROVIGIL) 200 MG tablet       lamoTRIgine (LAMICTAL) 25 MG tablet Take 1 tablet by mouth 2 times daily 180 tablet 1    citalopram (CELEXA) 10 MG tablet Take 1 tablet by mouth daily 90 tablet 1    LORazepam (ATIVAN) 1 MG tablet Take 1 tablet by mouth every 6 hours as needed for Anxiety.      triamcinolone (ARISTOCORT) 0.5 % ointment Apply topically 2 times daily Apply topically 2 times daily.      rOPINIRole (REQUIP) 1 MG tablet TAKE 1 TABLET THREE TIMES A  tablet 3    valACYclovir (VALTREX) 1 g tablet Take 2 tablets by mouth 2 times daily as needed (PRN) Indications: COLD SORES 90 tablet 3

## 2024-11-06 ENCOUNTER — OFFICE VISIT (OUTPATIENT)
Age: 44
End: 2024-11-06

## 2024-11-06 VITALS — WEIGHT: 160 LBS | BODY MASS INDEX: 31.41 KG/M2 | HEIGHT: 60 IN

## 2024-11-06 DIAGNOSIS — M24.176 JOINT DERANGEMENT OF ANKLE OR FOOT: Primary | ICD-10-CM

## 2024-11-06 DIAGNOSIS — M25.571 CHRONIC PAIN OF RIGHT ANKLE: ICD-10-CM

## 2024-11-06 DIAGNOSIS — M24.173 JOINT DERANGEMENT OF ANKLE OR FOOT: Primary | ICD-10-CM

## 2024-11-06 DIAGNOSIS — G89.29 CHRONIC PAIN OF RIGHT ANKLE: ICD-10-CM

## 2024-11-06 NOTE — PROGRESS NOTES
[facility]  [facility]  [address]  [facility] [id number] [phone]     Patient: Ruth Sherman   YOB: 1980   Date: 11/6/2024   Visit Type:  Post op    Body Part:  right ankle    What was the date of surgery? 05/30/2024        History of Present Illness  Chief Complaint   Patient presents with    Follow-up     3 month follow up right ankle surgery        This is a 44 y.o. female  presents today for follow up of ankle arthroscopy with microfracture and cartilage grafting of right ankle.  She does relate slow improvement.  She is in therapy now and would like to continue therapy to increase her strength and endurance.  She does feel like physical therapy is really helping her.  They do use e-stim and a TENS unit and that helps her.    Review of Systems  System  Neg/Pos  Details  Constitutional  Negative  Chills, Fatigue, Fever and Night Sweats  Respiratory  Negative  Chest Pain, Cough and Dyspnea  Cardio   Negative  Leg Swelling  GI   Negative  Abdominal Pain, Constipation, Nausea and Vomiting     Negative  Urinary Incontinence   Endocrine  Negative  Weight Gain and Weight Loss  MS   Negative  Except as noted in HPI and Chief Complaint    Past Medical History:   Diagnosis Date    Anxiety     Bell's palsy     Chronic migraine without aura, intractable, without status migrainosus 08/10/2017    CPAP (continuous positive airway pressure) dependence     Depression     Headache     Insomnia     Intractable migraine without aura and without status migrainosus 08/10/2017    Irritable bowel syndrome     Kidney stone 10/2017    Narcolepsy without cataplexy     JAMES (obstructive sleep apnea)     Osteoarthritis     Periodic limb movement disorder (PLMD)     Snoring     PSG, 7/2016-mild      Past Surgical History:   Procedure Laterality Date    ANKLE SURGERY Right     CHOLECYSTECTOMY  05/10/2023    CYSTOURETHROSCOPY/STENT REMOVAL

## 2024-11-08 ENCOUNTER — PATIENT MESSAGE (OUTPATIENT)
Dept: PRIMARY CARE CLINIC | Age: 44
End: 2024-11-08

## 2024-11-08 RX ORDER — CEFDINIR 300 MG/1
300 CAPSULE ORAL 2 TIMES DAILY
Qty: 14 CAPSULE | Refills: 0 | Status: SHIPPED | OUTPATIENT
Start: 2024-11-08 | End: 2024-11-15

## 2024-12-03 ENCOUNTER — OFFICE VISIT (OUTPATIENT)
Dept: PRIMARY CARE CLINIC | Age: 44
End: 2024-12-03

## 2024-12-03 VITALS
TEMPERATURE: 98.4 F | RESPIRATION RATE: 16 BRPM | OXYGEN SATURATION: 97 % | BODY MASS INDEX: 31.41 KG/M2 | HEART RATE: 87 BPM | WEIGHT: 160 LBS | HEIGHT: 60 IN | SYSTOLIC BLOOD PRESSURE: 118 MMHG | DIASTOLIC BLOOD PRESSURE: 82 MMHG

## 2024-12-03 DIAGNOSIS — G47.00 INSOMNIA, UNSPECIFIED TYPE: ICD-10-CM

## 2024-12-03 DIAGNOSIS — R10.2 PELVIC PRESSURE IN FEMALE: ICD-10-CM

## 2024-12-03 DIAGNOSIS — Z12.31 SCREENING MAMMOGRAM FOR BREAST CANCER: ICD-10-CM

## 2024-12-03 DIAGNOSIS — R30.0 DYSURIA: ICD-10-CM

## 2024-12-03 DIAGNOSIS — Z79.899 MEDICATION MANAGEMENT: ICD-10-CM

## 2024-12-03 DIAGNOSIS — F41.9 ANXIETY: Primary | ICD-10-CM

## 2024-12-03 LAB
ALCOHOL URINE: NORMAL
AMPHETAMINE SCREEN URINE: NORMAL
APPEARANCE FLUID: CLEAR
BARBITURATE SCREEN URINE: NORMAL
BENZODIAZEPINE SCREEN, URINE: NORMAL
BILIRUBIN, POC: NORMAL
BLOOD URINE, POC: NORMAL
BUPRENORPHINE URINE: NORMAL
CLARITY, POC: CLEAR
COCAINE METABOLITE SCREEN URINE: NORMAL
COLOR, POC: YELLOW
FENTANYL SCREEN, URINE: NORMAL
GABAPENTIN SCREEN, URINE: NORMAL
GLUCOSE URINE, POC: NORMAL MG/DL
KETONES, POC: NORMAL MG/DL
LEUKOCYTE EST, POC: NORMAL
MDMA, URINE: NORMAL
METHADONE SCREEN, URINE: NORMAL
METHAMPHETAMINE, URINE: NORMAL
NITRITE, POC: NORMAL
OPIATE SCREEN URINE: NORMAL
OXYCODONE SCREEN URINE: NORMAL
PH, POC: 6.5
PHENCYCLIDINE SCREEN URINE: NORMAL
PROPOXYPHENE SCREEN, URINE: NORMAL
PROTEIN, POC: NORMAL MG/DL
SPECIFIC GRAVITY, POC: >=1.03
SYNTHETIC CANNABINOIDS(K2) SCREEN, URINE: NORMAL
THC SCREEN, URINE: NORMAL
TRAMADOL SCREEN URINE: NORMAL
TRICYCLIC ANTIDEPRESSANTS, UR: NORMAL
UROBILINOGEN, POC: 0.2 MG/DL

## 2024-12-03 RX ORDER — LAMOTRIGINE 25 MG/1
25 TABLET ORAL 2 TIMES DAILY
Qty: 180 TABLET | Refills: 1 | Status: SHIPPED | OUTPATIENT
Start: 2024-12-03

## 2024-12-03 RX ORDER — MELOXICAM 15 MG/1
15 TABLET ORAL DAILY
Qty: 90 TABLET | Refills: 1 | Status: SHIPPED | OUTPATIENT
Start: 2024-12-03

## 2024-12-03 RX ORDER — SPIRONOLACTONE 50 MG/1
TABLET, FILM COATED ORAL
COMMUNITY
Start: 2024-10-23

## 2024-12-03 RX ORDER — ESZOPICLONE 3 MG/1
TABLET, FILM COATED ORAL
Qty: 30 TABLET | Refills: 1 | Status: SHIPPED | OUTPATIENT
Start: 2024-12-03 | End: 2025-01-03

## 2024-12-03 RX ORDER — LORAZEPAM 1 MG/1
1 TABLET ORAL EVERY 6 HOURS PRN
Qty: 30 TABLET | Refills: 2 | Status: SHIPPED | OUTPATIENT
Start: 2024-12-03 | End: 2025-01-02

## 2024-12-03 RX ORDER — CETIRIZINE HYDROCHLORIDE 10 MG/1
10 TABLET ORAL DAILY
Qty: 90 TABLET | Refills: 3 | Status: SHIPPED | OUTPATIENT
Start: 2024-12-03

## 2024-12-03 RX ORDER — CLOBETASOL PROPIONATE 0.05 G/100ML
SHAMPOO TOPICAL
Qty: 118 ML | Refills: 0 | Status: SHIPPED | OUTPATIENT
Start: 2024-12-03

## 2024-12-03 RX ORDER — CITALOPRAM HYDROBROMIDE 10 MG/1
10 TABLET ORAL DAILY
Qty: 90 TABLET | Refills: 1 | Status: SHIPPED | OUTPATIENT
Start: 2024-12-03

## 2024-12-03 ASSESSMENT — ENCOUNTER SYMPTOMS
EYE DISCHARGE: 0
COLOR CHANGE: 0
VOMITING: 0
DIARRHEA: 0
COUGH: 0
ABDOMINAL PAIN: 0
BACK PAIN: 1
WHEEZING: 0
NAUSEA: 0

## 2024-12-03 NOTE — PROGRESS NOTES
SUBJECTIVE:    Patient ID: Ruth Sherman is a 44 y.o. female.    HPI:   Patient is seen today for 6-month follow-up.  She states that she feels like her arthritis is worsening.  She states that she is having more tingling in her hands and arms.  She states that she is not taking anything for the arthritis currently.  She states that she does have Tylenol but states that this is not doing much for her arthritis pain.  She states she is also having more increased back pain.    She does have a history of insomnia and is on Lunesta.  She has been on this for a long time.  She states that this works well for her.  She states that she also needs a refill on her lorazepam which she has been on for a long time.  Past Medical History:   Diagnosis Date    Anxiety     Bell's palsy     Chronic migraine without aura, intractable, without status migrainosus 08/10/2017    CPAP (continuous positive airway pressure) dependence     Depression     Headache     Insomnia     Intractable migraine without aura and without status migrainosus 08/10/2017    Irritable bowel syndrome     Kidney stone 10/2017    Narcolepsy without cataplexy     JAMES (obstructive sleep apnea)     Osteoarthritis     Periodic limb movement disorder (PLMD)     Snoring     PSG, 7/2016-mild      Current Outpatient Medications on File Prior to Visit   Medication Sig Dispense Refill    spironolactone (ALDACTONE) 50 MG tablet       eszopiclone 3 MG TABS       modafinil (PROVIGIL) 200 MG tablet       LORazepam (ATIVAN) 1 MG tablet Take 1 tablet by mouth every 6 hours as needed for Anxiety.      triamcinolone (ARISTOCORT) 0.5 % ointment Apply topically 2 times daily Apply topically 2 times daily.      rOPINIRole (REQUIP) 1 MG tablet TAKE 1 TABLET THREE TIMES A  tablet 3    valACYclovir (VALTREX) 1 g tablet Take 2 tablets by mouth 2 times daily as needed (PRN) Indications: COLD SORES 90 tablet 3    clobetasol (TEMOVATE) 0.05 % cream clobetasol 0.05 % topical cream

## 2024-12-09 ENCOUNTER — OFFICE VISIT (OUTPATIENT)
Dept: NEUROLOGY | Age: 44
End: 2024-12-09
Payer: OTHER GOVERNMENT

## 2024-12-09 VITALS
HEART RATE: 93 BPM | WEIGHT: 160.4 LBS | HEIGHT: 60 IN | DIASTOLIC BLOOD PRESSURE: 78 MMHG | SYSTOLIC BLOOD PRESSURE: 123 MMHG | BODY MASS INDEX: 31.49 KG/M2

## 2024-12-09 DIAGNOSIS — Z99.89 CPAP (CONTINUOUS POSITIVE AIRWAY PRESSURE) DEPENDENCE: ICD-10-CM

## 2024-12-09 DIAGNOSIS — G47.33 OBSTRUCTIVE SLEEP APNEA: Primary | ICD-10-CM

## 2024-12-09 DIAGNOSIS — G47.419 PRIMARY NARCOLEPSY WITHOUT CATAPLEXY: ICD-10-CM

## 2024-12-09 DIAGNOSIS — G25.81 RESTLESS LEG SYNDROME: ICD-10-CM

## 2024-12-09 DIAGNOSIS — G47.61 PERIODIC LIMB MOVEMENT DISORDER: ICD-10-CM

## 2024-12-09 DIAGNOSIS — R40.0 SOMNOLENCE, DAYTIME: ICD-10-CM

## 2024-12-09 PROCEDURE — 99214 OFFICE O/P EST MOD 30 MIN: CPT | Performed by: PHYSICIAN ASSISTANT

## 2024-12-09 NOTE — PROGRESS NOTES
REVIEW OF SYSTEMS    Constitutional: []Fever []Sweats []Chills [] Recent Injury [x] Denies all unless marked  HEENT:[]Headache  [] Head Injury [] Hearing Loss  [] Sore Throat  [] Ear Ache [x] Denies all unless marked  Spine:  [] Neck pain  [] Back pain  [] Sciaticia  [x] Denies all unless marked  Cardiovascular:[]Heart Disease []Palpitations [] Chest Pain   [x] Denies all unless marked  Pulmonary: []Shortness of Breath []Cough   [x] Denies all unless marked  Psychiatric/Behavioral:[] Depression [] Anxiety [x] Denies all unless marked  Gastrointestinal: []Nausea  []Vomiting  []Abdominal Pain  []Constipation  []Diarrhea  [x] Denies all unless marked  Genitourinary:   [] Frequency  [] Urgency  [] Dysuria [] Incontinence  [x] Denies all unless marked  Extremities: []Pain  []Swelling  [x] Denies all unless marked  Musculoskeletal: [] Myalgias  [] Joint Pain  [] Arthritis [] Muscle Cramps [] Muscle Twitches  [x] Denies all unless marked  Sleep: []Insomnia[]Snoring []Restless Legs  [x]Sleep Apnea  []Daytime Sleepiness  [x] Denies all unless marked  Skin:[] Rash [] Color Change [x] Denies all unless marked   Neurological:[]Visual Disturbance [] Memory Loss []Loss of Balance []Slurred Speech []Weakness []Seizures  [] Dizziness [x] Denies all unless marked     
dozin = would never doze-\"never\"  1 = slight chance of dozing-\"rarely\"  2 = moderate chance of dozing-\"sometimes\"  3 = high chance of dozing-\"always\"    Situation Chance of Dozing (0-3)    Sitting and reading       3    Watching TV        3    Sitting, inactive in a public place (e.g. a theatre or a meeting) 1    As a passenger in a car for an hour without a break   3    Lying down to rest in the afternoon when circumstances permit 3    Sitting and talking to someone     1    Sitting quietly after lunch without alcohol    2    In a car, while stopped for a few minutes in the traffic  0    Total         16    Previous ESS score: 18 ()         NOTE: The total ESS score can range from 0 to 24, with higher scores correlating with increasing degrees of sleepiness.    A score greater than 10 is consistent with excessive sleepiness.         Assessment:       ICD-10-CM    1. Obstructive sleep apnea  G47.33       2. Primary narcolepsy without cataplexy  G47.419       3. Periodic limb movement disorder  G47.61       4. Restless leg syndrome  G25.81       5. Somnolence, daytime  R40.0       6. CPAP (continuous positive airway pressure) dependence  Z99.89                [x]  :  Stable-RLS/PLMD     []  :  Improved                       []  :  Well controlled              []  :  Resolving     []  :  Resolved     [x]  :  Inadequately controlled-JAMES/daytime somnolence     []  :  Worsening     []  :  Additional workup planned      Restart PAP therapy. The patient recognizes the need for adherence to the prescribed therapy. I continue to express the importance of compliance with current therapies. I did explain to her  the potential risks of untreated sleep apnea. I also explained that when she is not using the PAP she does remain untreated with regards to her sleep apnea. she states understanding and will work on further compliance. Will place orders for a new PAP. She is advised to continue modafinil 200 mg and

## 2024-12-23 ENCOUNTER — OFFICE VISIT (OUTPATIENT)
Age: 44
End: 2024-12-23
Payer: OTHER GOVERNMENT

## 2024-12-23 DIAGNOSIS — M84.374A STRESS FRACTURE OF METATARSAL BONE OF RIGHT FOOT, INITIAL ENCOUNTER: Primary | ICD-10-CM

## 2024-12-23 DIAGNOSIS — M95.8 OSTEOCHONDRAL DEFECT OF TALUS: ICD-10-CM

## 2024-12-23 DIAGNOSIS — M79.671 RIGHT FOOT PAIN: ICD-10-CM

## 2024-12-23 PROCEDURE — 99214 OFFICE O/P EST MOD 30 MIN: CPT | Performed by: PODIATRIST

## 2024-12-23 NOTE — PROGRESS NOTES
[facility]  [facility]  [address]  [facility] [id number] [phone]     Patient: Ruth Sherman   YOB: 1980   Date: 12/23/2024   Visit Type:  Follow up    Body Part: Ankle right    When did the symptoms being/Date of Onset or date of surgery? Surgery 05/30/24    Last seen in clinic 11/6/24    Pt stated at last follow up that she was slowly improving. Pt states she is now having complications due to falling down the stairs on 12/7/24. Pt states she has applied ice, wrapped her right ankle up, and kept it elevated. Pt rates her pain 4 on daily average.      If over 55, have you andres an Osteoporosis Screening in the last 2 years? NA    History of Present Illness  Chief Complaint   Patient presents with    Follow Up Right Ankle       This is a 44 y.o. female  presents today complaining of right ankle pain.Pt stated at last follow up that she was slowly improving. Pt states she is now having complications due to falling down the stairs on 12/7/24. Pt states she has applied ice, wrapped her right ankle up, and kept it elevated. Pt rates her pain 4 on daily average.     Previous ankle scope with cartilage graft.      She points to her foot where she has most pain.  She is swelling over the forefoot.  She has been treating with rest, ice, elevation, NSAIDs without success.    Review of Systems  System  Neg/Pos  Details  Constitutional  Negative  Chills, Fatigue, Fever and Night Sweats  Respiratory  Negative  Chest Pain, Cough and Dyspnea  Cardio   Negative  Leg Swelling  GI   Negative  Abdominal Pain, Constipation, Nausea and Vomiting     Negative  Urinary Incontinence   Endocrine  Negative  Weight Gain and Weight Loss  MS   Negative  Except as noted in HPI and Chief Complaint    Past Medical History:   Diagnosis Date    Anxiety     Bell's palsy     Chronic migraine without aura, intractable, without status migrainosus

## 2024-12-23 NOTE — PROGRESS NOTES
[facility]  [facility]  [address]  [facility] [id number] [phone]     Patient: Ruth Sherman   YOB: 1980   Date: 12/23/2024   Visit Type:  Follow up    Body Part: Ankle right    When did the symptoms being/Date of Onset or date of surgery? Surgery 05/30/24    Last seen in clinic 11/6/24    Pt stated at last follow up that she was slowly improving. Pt states she is now having complications due to falling down the stairs on 12/7/24. Pt states she has applied ice, wrapped her right ankle up, and kept it elevated. Pt rates her pain 4 on daily average.      If over 55, have you andres an Osteoporosis Screening in the last 2 years? NA    History of Present Illness  Chief Complaint   Patient presents with    Follow Up Right Ankle       This is a 44 y.o. female  presents today complaining of right foot and ankle pain.  Does have a history of ankle arthroscopy with microfracture of her talus right.  She had an injury on 12 7 where she fell down stairs.  She has had significant pain and swelling to the right foot.  She points to her right foot over the midfoot where she has pain    Review of Systems  System  Neg/Pos  Details  Constitutional  Negative  Chills, Fatigue, Fever and Night Sweats  Respiratory  Negative  Chest Pain, Cough and Dyspnea  Cardio   Negative  Leg Swelling  GI   Negative  Abdominal Pain, Constipation, Nausea and Vomiting     Negative  Urinary Incontinence   Endocrine  Negative  Weight Gain and Weight Loss  MS   Negative  Except as noted in HPI and Chief Complaint    Past Medical History:   Diagnosis Date    Anxiety     Bell's palsy     Chronic migraine without aura, intractable, without status migrainosus 08/10/2017    CPAP (continuous positive airway pressure) dependence     Depression     Headache     Insomnia     Intractable migraine without aura and without status migrainosus 08/10/2017    Irritable bowel

## 2024-12-27 ENCOUNTER — TELEPHONE (OUTPATIENT)
Dept: NEUROLOGY | Age: 44
End: 2024-12-27

## 2024-12-27 ENCOUNTER — HOSPITAL ENCOUNTER (OUTPATIENT)
Dept: WOMENS IMAGING | Age: 44
Discharge: HOME | End: 2024-12-27
Payer: OTHER GOVERNMENT

## 2024-12-27 DIAGNOSIS — Z12.31 SCREENING MAMMOGRAM FOR BREAST CANCER: ICD-10-CM

## 2024-12-27 PROCEDURE — 77063 BREAST TOMOSYNTHESIS BI: CPT

## 2024-12-27 NOTE — TELEPHONE ENCOUNTER
Patient came into office requesting order for machine and supplies [facility] sent to [facility].

## 2024-12-31 RX ORDER — CLOBETASOL PROPIONATE 0.05 G/100ML
SHAMPOO TOPICAL
Qty: 118 ML | Refills: 0 | Status: SHIPPED | OUTPATIENT
Start: 2024-12-31

## 2025-01-13 ENCOUNTER — OFFICE VISIT (OUTPATIENT)
Age: 45
End: 2025-01-13
Payer: OTHER GOVERNMENT

## 2025-01-13 VITALS — WEIGHT: 160.4 LBS | BODY MASS INDEX: 31.49 KG/M2 | HEIGHT: 60 IN

## 2025-01-13 DIAGNOSIS — M95.8 OSTEOCHONDRAL DEFECT OF TALUS: ICD-10-CM

## 2025-01-13 DIAGNOSIS — M79.671 RIGHT FOOT PAIN: Primary | ICD-10-CM

## 2025-01-13 DIAGNOSIS — M84.374A STRESS FRACTURE OF METATARSAL BONE OF RIGHT FOOT, INITIAL ENCOUNTER: ICD-10-CM

## 2025-01-13 DIAGNOSIS — M24.173 JOINT DERANGEMENT OF ANKLE OR FOOT: ICD-10-CM

## 2025-01-13 DIAGNOSIS — G89.29 CHRONIC PAIN OF RIGHT ANKLE: ICD-10-CM

## 2025-01-13 DIAGNOSIS — M24.176 JOINT DERANGEMENT OF ANKLE OR FOOT: ICD-10-CM

## 2025-01-13 DIAGNOSIS — M25.571 CHRONIC PAIN OF RIGHT ANKLE: ICD-10-CM

## 2025-01-13 PROCEDURE — 99213 OFFICE O/P EST LOW 20 MIN: CPT | Performed by: PODIATRIST

## 2025-01-13 NOTE — PROGRESS NOTES
[facility]  [facility]  [address]  [facility] [id number] [phone]     Patient: Ruth Sherman   YOB: 1980   Date: 1/13/2025   Visit Type:  Follow up    Body Part:  right Ankle    When did the symptoms begin/Date of Onset or date of surgery?   Date of Surgery: 5/30/24  Last Seen in Clinic: 12/23/24    Pt states that it still hurts pretty bad. Pt states the pain has moved upward on her right ankle. Pt states that she doesn't think she improving. Pt came into the clinic today weightbearing and in a walking boot. Pt rates her pain 6 on daily average.       If over 55, have you andres an Osteoporosis Screening in the last 2 years? NA    History of Present Illness  Chief Complaint   Patient presents with    Follow Up Right Ankle       This is a 44 y.o. female  presents today complaining of right foot and ankle pain.  She has been in a boot for the last 4 weeks.  She does have a history of osteochondral defect repair with cartilage grafting back in May.  She was doing great until she fell down a flight of stairs in December.  She relates continued pain.  This point feels like it is more in her ankle and her foot.    Review of Systems  System  Neg/Pos  Details  Constitutional  Negative  Chills, Fatigue, Fever and Night Sweats  Respiratory  Negative  Chest Pain, Cough and Dyspnea  Cardio   Negative  Leg Swelling  GI   Negative  Abdominal Pain, Constipation, Nausea and Vomiting     Negative  Urinary Incontinence   Endocrine  Negative  Weight Gain and Weight Loss  MS   Negative  Except as noted in HPI and Chief Complaint    Past Medical History:   Diagnosis Date    Anxiety     Bell's palsy     Chronic migraine without aura, intractable, without status migrainosus 08/10/2017    CPAP (continuous positive airway pressure) dependence     Depression     Headache     Insomnia     Intractable migraine without aura and without status

## 2025-01-17 ENCOUNTER — HOSPITAL ENCOUNTER (OUTPATIENT)
Dept: MRI IMAGING | Age: 45
Discharge: HOME | End: 2025-01-17
Payer: OTHER GOVERNMENT

## 2025-01-17 DIAGNOSIS — M24.173 JOINT DERANGEMENT OF ANKLE OR FOOT: ICD-10-CM

## 2025-01-17 DIAGNOSIS — M95.8 OSTEOCHONDRAL DEFECT OF TALUS: ICD-10-CM

## 2025-01-17 DIAGNOSIS — M24.176 JOINT DERANGEMENT OF ANKLE OR FOOT: ICD-10-CM

## 2025-01-17 PROCEDURE — 73721 MRI JNT OF LWR EXTRE W/O DYE: CPT

## 2025-01-29 ENCOUNTER — OFFICE VISIT (OUTPATIENT)
Age: 45
End: 2025-01-29
Payer: OTHER GOVERNMENT

## 2025-01-29 VITALS — BODY MASS INDEX: 31.41 KG/M2 | HEIGHT: 60 IN | WEIGHT: 160 LBS

## 2025-01-29 DIAGNOSIS — M95.8 OSTEOCHONDRAL DEFECT OF TALUS: Primary | ICD-10-CM

## 2025-01-29 DIAGNOSIS — M24.071 LOOSE BODY IN RIGHT ANKLE: ICD-10-CM

## 2025-01-29 DIAGNOSIS — M24.176 JOINT DERANGEMENT OF ANKLE OR FOOT: ICD-10-CM

## 2025-01-29 DIAGNOSIS — M25.571 ACUTE RIGHT ANKLE PAIN: ICD-10-CM

## 2025-01-29 DIAGNOSIS — M24.173 JOINT DERANGEMENT OF ANKLE OR FOOT: ICD-10-CM

## 2025-01-29 PROCEDURE — 99213 OFFICE O/P EST LOW 20 MIN: CPT | Performed by: PODIATRIST

## 2025-01-29 RX ORDER — FLUOCINONIDE TOPICAL SOLUTION USP, 0.05% 0.5 MG/ML
SOLUTION TOPICAL DAILY
COMMUNITY
Start: 2025-01-20

## 2025-01-29 RX ORDER — ESZOPICLONE 3 MG/1
3 TABLET, FILM COATED ORAL NIGHTLY
COMMUNITY
Start: 2025-01-22

## 2025-01-29 RX ORDER — SUCRALFATE 1 G/1
1 TABLET ORAL 4 TIMES DAILY
COMMUNITY
Start: 2025-01-20

## 2025-01-29 NOTE — H&P (VIEW-ONLY)
[facility]  [facility]  [address]  [facility] [id number] [phone]     Patient: Crystal Waldrop   YOB: 1980   Date: 1/29/2025   Visit Type:  Follow up    Body Part:  right ankle    When did the symptoms begin/Date of Onset or date of surgery?   Date of Surgery: 5/30/24  Last Seen in Clinic: 01/13/25    Pt states that it still hurts pretty bad. Pt states the pain has moved upward on her right ankle. Pt states that she doesn't think she improving. Pt came into the clinic today weightbearing and in a walking boot. Pt rates her pain 6 on daily average. Pt states that she still is having swelling in the right ankle    If over 55, have you quiroz an Osteoporosis Screening in the last 2 years? NA    History of Present Illness  Chief Complaint   Patient presents with   • Follow Up Right Ankle MRI Review       This is a 44 y.o. female  presents today complaining of right foot and ankle pain.  She has been in a boot for the last 6 weeks.  She does have a history of osteochondral defect repair with cartilage grafting back in May.  She was doing great until she fell down a flight of stairs in December.  She relates continued pain.  This point feels like it is more in her ankle.  Her foot is improved.  She points to the front inside of her ankle where she has pain.      Review of Systems  System  Neg/Pos  Details  Constitutional  Negative  Chills, Fatigue, Fever and Night Sweats  Respiratory  Negative  Chest Pain, Cough and Dyspnea  Cardio   Negative  Leg Swelling  GI   Negative  Abdominal Pain, Constipation, Nausea and Vomiting     Negative  Urinary Incontinence   Endocrine  Negative  Weight Gain and Weight Loss  MS   Negative  Except as noted in HPI and Chief Complaint    Past Medical History:   Diagnosis Date   • Anxiety    • Bell's palsy    • Chronic migraine without aura, intractable, without status migrainosus 08/10/2017   • CPAP  (continuous positive airway pressure) dependence    • Depression    • Headache    • Insomnia    • Intractable migraine without aura and without status migrainosus 08/10/2017   • Irritable bowel syndrome    • Kidney stone 10/2017   • Narcolepsy without cataplexy    • JAZLYN (obstructive sleep apnea)    • Osteoarthritis    • Periodic limb movement disorder (PLMD)       Past Surgical History:   Procedure Laterality Date   • ANKLE SURGERY Right    • CHOLECYSTECTOMY  05/10/2023   • CYSTOURETHROSCOPY/STENT REMOVAL     • HYSTERECTOMY (CERVIX STATUS UNKNOWN) N/A 11/21/2019    TOTAL LAPAROSCOPIC HYSTERECTOMY WITH DAVINCI CYSTOSCOPY performed by Dinora Atkinson MD at [facility] OR   • TUBAL LIGATION     • URETER STENT PLACEMENT      X 2      Social History     Socioeconomic History   • Marital status:      Spouse name: None   • Number of children: None   • Years of education: None   • Highest education level: None   Tobacco Use   • Smoking status: Never   • Smokeless tobacco: Never   Vaping Use   • Vaping status: Never Used   Substance and Sexual Activity   • Alcohol use: Yes     Comment: RARELY   • Drug use: No   • Sexual activity: Yes     Partners: Male     Social Determinants of Health     Financial Resource Strain: Low Risk  (5/29/2024)    Overall Financial Resource Strain (CARDIA)    • Difficulty of Paying Living Expenses: Not hard at all   Food Insecurity: No Food Insecurity (5/29/2024)    Hunger Vital Sign    • Worried About Running Out of Food in the Last Year: Never true    • Ran Out of Food in the Last Year: Never true   Transportation Needs: Unknown (5/29/2024)    PRAPARE - Transportation    • Lack of Transportation (Non-Medical): No    [facility]    Family and Community Support   Intimate Partner Violence: Not At Risk (5/24/2024)    [facility]    Abuse Screen    • Feels Unsafe at Home or Work/School: no     • Feels Threatened by Someone: no    • Does Anyone Try to Keep You From Having Contact with Others or Doing Things Outside Your Home?: no    • Physical Signs of Abuse Present: no   Housing Stability: Unknown (5/29/2024)    Housing Stability Vital Sign    • Unstable Housing in the Last Year: No      Social History     Occupational History   • Not on file   Tobacco Use   • Smoking status: Never   • Smokeless tobacco: Never   Vaping Use   • Vaping status: Never Used   Substance and Sexual Activity   • Alcohol use: Yes     Comment: RARELY   • Drug use: No   • Sexual activity: Yes     Partners: Male        Tobacco Use      Smoking status: Never      Smokeless tobacco: Never     Family History   Problem Relation Age of Onset   • Osteoporosis Mother         Medications  Current Outpatient Medications   Medication Sig Dispense Refill   • eszopiclone 3 MG TABS Take 1 tablet by mouth nightly. Max Daily Amount: 3 mg     • fluocinonide (LIDEX) 0.05 % external solution Apply topically daily     • sucralfate (CARAFATE) 1 GM tablet Take 1 tablet by mouth 4 times daily     • Clobetasol Propionate 0.05 % SHAM APPLY TO THE AFFEECTED AREA(S) ON THE SKIN TWICE A WEEK 118 mL 0   • spironolactone (ALDACTONE) 50 MG tablet      • citalopram (CELEXA) 10 MG tablet Take 1 tablet by mouth daily 90 tablet 1   • lamoTRIgine (LAMICTAL) 25 MG tablet Take 1 tablet by mouth 2 times daily 180 tablet 1   • cetirizine (ZYRTEC) 10 MG tablet Take 1 tablet by mouth daily 90 tablet 3   • modafinil (PROVIGIL) 200 MG tablet      • triamcinolone (ARISTOCORT) 0.5 % ointment Apply topically 2 times daily Apply topically 2 times daily.     • rOPINIRole (REQUIP) 1 MG tablet TAKE 1 TABLET THREE TIMES A  tablet 3   • valACYclovir (VALTREX) 1 g tablet Take 2 tablets by mouth 2 times daily as needed (PRN) Indications: COLD SORES 90 tablet 3   • clobetasol (TEMOVATE) 0.05 % cream clobetasol 0.05 % topical cream   APPLY TWICE DAILY TO RASH UP TO 2 WEEKS/MONTH AS  NEEDED     • Prucalopride Succinate (MOTEGRITY) 2 MG TABS Take 1 tablet by mouth daily     • omeprazole (PRILOSEC) 40 MG delayed release capsule Take 1 capsule by mouth daily     • glycopyrrolate (ROBINUL) 2 MG tablet Take 1 tablet by mouth 2 times daily       No current facility-administered medications for this visit.        Allergies  Allergies   Allergen Reactions   • Doxycycline Hives and Itching   • Minocycline Hives        Ht 1.524 m (5')   Wt 72.6 kg (160 lb)   LMP 10/19/2019   BMI 31.25 kg/m²      Physical Exam   Physical Examination:  General: The patient is a Well Nourished 44 y.o. female who is alert and oriented x3 in no distress. The patient is cooperative during the exam.  Psychological: Is a pleasant female, good mood and affect, answers questions appropriately.  Head and Neck: Normocephalic, Atraumatic. Neck supple, no evidence of masses.   Abdomen: Soft, nontender, nondistended.  Eyes: Perrla. Extraocular movements intact.   Respiratory: Rate and effort within normal limits.   Vascular: Palpable dorsalis pedis and posterior tibial pulse bilaterally.  Neurological: Adequate sharp and dull sensation bilateral lower extremities.  Dermatological: No open lesions or abrasions.  No evidence of infection.  Musculoskeletal: She has pain with palpation of the right ankle.  There is pain with range of motion.  She does have a palpable palpable pop almost like there is a loose body at the anterior medial aspect of the right ankle joint.  Gait Examination: Antalgic      Imaging  MRI Result (most recent):  MRI ANKLE RIGHT WO CONTRAST 01/17/2025    Narrative  EXAM:  MRI OF THE RIGHT ANKLE/HINDFOOT WITHOUT CONTRAST    HISTORY:  Osteochondral defect of talus.  Fall.  Pain.    COMPARISON:  RADIOGRAPHS 02/06/2024 AND MRI 03/26/2024    TECHNIQUE:  Routine noncontrast multiplanar and multisequence MR imaging of the right ankle/hindfoot.  The forefoot was incompletely imaged due to the protocol  no employed.    FINDINGS:    Bone Marrow: There has been progression of the osteochondral lesion at the medial aspect of the talar dome with increased marrow edema in size now measuring 11 x 5 mm.  The articular surface irregularity (series 5 image 16) with subtle flattening of the  talar dome contour but without radha articular surface collapse.  No undercutting fluid or other discrete findings of fragment instability.  There is a focal high-grade defect of the overlying talar articular cartilage.  Developing secondary  osteoarthritis of the medial aspect of the tibiotalar joint with subchondral edema. No acute fracture or dislocation.  No suspicious marrow replacing process.    Joints: Mild tibiotalar osteoarthritis as above.    Fluid: Physiologic amount of joint fluid without reactive marrow or erosive change. No ganglion/synovial cyst.    Achilles Tendon: Intact.    Plantar Fascia:  Intact.    Tibialis Posterior Tendon: Intact.  Flexor Digitorum Longus/Flexor Hallucis Longus Tendons: Intact.    Peroneal Tendons: Peroneus longus and brevis are intact without significant subluxation.    Extensor Tendons: Tibialis anterior, extensor hallucis longus and extensor digitorum longus are intact.    Syndesmosis: Anterior and posterior tibiofibular ligaments are intact.  Anterior Talofibular Ligament: Thickening representing chronic sprain.  Calcaneofibular Ligament: Chronic sprain.  Posterior Talofibular Ligament: Intact.    Deltoid Ligament: Intact.  Spring Ligament: Intact.    Sinus Tarsi/Tarsal Tunnel: Unremarkable.    Subcutaneous Tissues/Muscles: Unremarkable.    Additional Findings: None.    Impression  Progression of an 11 x 5 mm medial talar dome osteochondral lesion with increased marrow edema and articular surface irregularity.  There is subtle flattening of the talar dome contour without radha articular surface collapse.  Full-thickness overlying  talar cartilage defect with developing osteoarthritis of the medial  tibiotalar joint including subchondral cystic change at the anteromedial talus and medial malleolus.        ______________________________________  Electronically signed by: LIZZIE GARVEY M.D.  Date:     01/17/2025  Time:    11:00          Plan    ICD-10-CM    1. Osteochondral defect of talus  M95.8       2. Joint derangement of ankle or foot  M24.173     M24.176       3. Loose body in right ankle  M24.071       4. Acute right ankle pain  M25.571            Plan Narrative  I discussed her condition with her today.  I reviewed her MRI with her today.  I did recommend surgical intervention for her to include ankle arthroscopy with possible removal of loose body possible microfracture of her talus with cartilage grafting.  She does have a palpable click at the anterior ankle with range of motion.  She may have a loose body right upfront at the ankle.  I discussed possible anterior medial arthrotomy if her loose body is not able to be removed through the scope.  Risks and benefits of this procedure were discussed.  Questions were answered.  At this point I do feel like she has failed nonoperative measures.  She was doing quite well prior to her fall in December.  Risks and complications including but not limited to infection, continued pain, need for further surgery were discussed and reviewed in detail today.  She does understand that there is some damage done to her ankle and she is likely to have some component of ankle pain long-term.  She made an additional surgery at some point down the road due to arthritic changes and pain in the ankle.      Return Postop's.      Patient given educational materials - see patient instructions.   Discussed use, benefit, and side effects of prescribed medications.  All patient questions answered.  Pt voiced understanding. Patient agreed with treatment plan. Follow up as needed.    This dictation was generated by voice recognition computer software. Although all attempts are made  to edit the dictation for accuracy, there may be errors in the transcription that are not intended.    Electronically signed by Randy Collado II, DPM on 1/29/2025 at 4:12 PM

## 2025-01-29 NOTE — PROGRESS NOTES
[facility]  [facility]  [address]  [facility] [id number] [phone]     Patient: Ruth Sherman   YOB: 1980   Date: 1/29/2025   Visit Type:  Follow up    Body Part:  right ankle    When did the symptoms begin/Date of Onset or date of surgery?   Date of Surgery: 5/30/24  Last Seen in Clinic: 01/13/25    Pt states that it still hurts pretty bad. Pt states the pain has moved upward on her right ankle. Pt states that she doesn't think she improving. Pt came into the clinic today weightbearing and in a walking boot. Pt rates her pain 6 on daily average. Pt states that she still is having swelling in the right ankle    If over 55, have you andres an Osteoporosis Screening in the last 2 years? NA    History of Present Illness  Chief Complaint   Patient presents with    Follow Up Right Ankle MRI Review       This is a 44 y.o. female  presents today complaining of right foot and ankle pain.  She has been in a boot for the last 6 weeks.  She does have a history of osteochondral defect repair with cartilage grafting back in May.  She was doing great until she fell down a flight of stairs in December.  She relates continued pain.  This point feels like it is more in her ankle.  Her foot is improved.  She points to the front inside of her ankle where she has pain.      Review of Systems  System  Neg/Pos  Details  Constitutional  Negative  Chills, Fatigue, Fever and Night Sweats  Respiratory  Negative  Chest Pain, Cough and Dyspnea  Cardio   Negative  Leg Swelling  GI   Negative  Abdominal Pain, Constipation, Nausea and Vomiting     Negative  Urinary Incontinence   Endocrine  Negative  Weight Gain and Weight Loss  MS   Negative  Except as noted in HPI and Chief Complaint    Past Medical History:   Diagnosis Date    Anxiety     Bell's palsy     Chronic migraine without aura, intractable, without status migrainosus 08/10/2017    CPAP

## 2025-02-06 ENCOUNTER — OFFICE VISIT (OUTPATIENT)
Dept: PRIMARY CARE CLINIC | Age: 45
End: 2025-02-06

## 2025-02-06 VITALS
OXYGEN SATURATION: 97 % | HEART RATE: 79 BPM | DIASTOLIC BLOOD PRESSURE: 82 MMHG | RESPIRATION RATE: 16 BRPM | BODY MASS INDEX: 31.22 KG/M2 | HEIGHT: 60 IN | TEMPERATURE: 98.4 F | SYSTOLIC BLOOD PRESSURE: 126 MMHG | WEIGHT: 159 LBS

## 2025-02-06 DIAGNOSIS — J02.9 SORE THROAT: ICD-10-CM

## 2025-02-06 DIAGNOSIS — J06.9 VIRAL URI: ICD-10-CM

## 2025-02-06 DIAGNOSIS — R52 BODY ACHES: Primary | ICD-10-CM

## 2025-02-06 LAB
INFLUENZA A ANTIBODY: NORMAL
INFLUENZA B ANTIBODY: NORMAL

## 2025-02-06 SDOH — ECONOMIC STABILITY: FOOD INSECURITY: WITHIN THE PAST 12 MONTHS, THE FOOD YOU BOUGHT JUST DIDN'T LAST AND YOU DIDN'T HAVE MONEY TO GET MORE.: NEVER TRUE

## 2025-02-06 SDOH — ECONOMIC STABILITY: FOOD INSECURITY: WITHIN THE PAST 12 MONTHS, YOU WORRIED THAT YOUR FOOD WOULD RUN OUT BEFORE YOU GOT MONEY TO BUY MORE.: NEVER TRUE

## 2025-02-06 ASSESSMENT — PATIENT HEALTH QUESTIONNAIRE - PHQ9
8. MOVING OR SPEAKING SO SLOWLY THAT OTHER PEOPLE COULD HAVE NOTICED. OR THE OPPOSITE, BEING SO FIGETY OR RESTLESS THAT YOU HAVE BEEN MOVING AROUND A LOT MORE THAN USUAL: NOT AT ALL
10. IF YOU CHECKED OFF ANY PROBLEMS, HOW DIFFICULT HAVE THESE PROBLEMS MADE IT FOR YOU TO DO YOUR WORK, TAKE CARE OF THINGS AT HOME, OR GET ALONG WITH OTHER PEOPLE: NOT DIFFICULT AT ALL
SUM OF ALL RESPONSES TO PHQ QUESTIONS 1-9: 1
2. FEELING DOWN, DEPRESSED OR HOPELESS: NOT AT ALL
5. POOR APPETITE OR OVEREATING: NOT AT ALL
9. THOUGHTS THAT YOU WOULD BE BETTER OFF DEAD, OR OF HURTING YOURSELF: NOT AT ALL
SUM OF ALL RESPONSES TO PHQ QUESTIONS 1-9: 1
SUM OF ALL RESPONSES TO PHQ9 QUESTIONS 1 & 2: 0
SUM OF ALL RESPONSES TO PHQ QUESTIONS 1-9: 1
4. FEELING TIRED OR HAVING LITTLE ENERGY: SEVERAL DAYS
3. TROUBLE FALLING OR STAYING ASLEEP: NOT AT ALL
6. FEELING BAD ABOUT YOURSELF - OR THAT YOU ARE A FAILURE OR HAVE LET YOURSELF OR YOUR FAMILY DOWN: NOT AT ALL
7. TROUBLE CONCENTRATING ON THINGS, SUCH AS READING THE NEWSPAPER OR WATCHING TELEVISION: NOT AT ALL
SUM OF ALL RESPONSES TO PHQ QUESTIONS 1-9: 1
1. LITTLE INTEREST OR PLEASURE IN DOING THINGS: NOT AT ALL

## 2025-02-06 ASSESSMENT — ENCOUNTER SYMPTOMS
DIARRHEA: 0
SORE THROAT: 1
COLOR CHANGE: 0
NAUSEA: 0
ABDOMINAL PAIN: 0
SINUS PRESSURE: 0
EYE REDNESS: 0
CHEST TIGHTNESS: 0
RHINORRHEA: 1
COUGH: 0
VOMITING: 0
EYE ITCHING: 0
EYE DISCHARGE: 0
WHEEZING: 0

## 2025-02-06 NOTE — PROGRESS NOTES
SUBJECTIVE:    Patient ID: Ruth Sherman is a 44 y.o. female.    History of Present Illness  The patient presents for evaluation of fever, body aches, and itchy throat.    She began experiencing symptoms this morning, including fever, chills, body aches, and fatigue. Additionally, she reports an itchy throat and ear discomfort. She has been in contact with her great-nephew, who is currently ill and on the third day of Augmentin treatment for a red throat and ears. He has not undergone any testing for influenza or RSV. She is not currently taking any medications and does not feel the need for cough suppressants or steroids at this time.    Supplemental Information  She is scheduled to undergo surgery this week.      Past Medical History:   Diagnosis Date    Anxiety     Bell's palsy     Chronic migraine without aura, intractable, without status migrainosus 08/10/2017    CPAP (continuous positive airway pressure) dependence     Depression     Headache     Insomnia     Intractable migraine without aura and without status migrainosus 08/10/2017    Irritable bowel syndrome     Kidney stone 10/2017    Narcolepsy without cataplexy     JAMES (obstructive sleep apnea)     Osteoarthritis     Periodic limb movement disorder (PLMD)       Current Outpatient Medications on File Prior to Visit   Medication Sig Dispense Refill    eszopiclone 3 MG TABS Take 1 tablet by mouth nightly.      fluocinonide (LIDEX) 0.05 % external solution Apply topically daily      sucralfate (CARAFATE) 1 GM tablet Take 1 tablet by mouth 4 times daily      Clobetasol Propionate 0.05 % SHAM APPLY TO THE AFFEECTED AREA(S) ON THE SKIN TWICE A WEEK 118 mL 0    spironolactone (ALDACTONE) 50 MG tablet       citalopram (CELEXA) 10 MG tablet Take 1 tablet by mouth daily 90 tablet 1    lamoTRIgine (LAMICTAL) 25 MG tablet Take 1 tablet by mouth 2 times daily 180 tablet 1    cetirizine (ZYRTEC) 10 MG tablet Take 1 tablet by mouth daily 90 tablet 3    modafinil

## 2025-02-11 ENCOUNTER — OFFICE VISIT (OUTPATIENT)
Dept: PRIMARY CARE CLINIC | Age: 45
End: 2025-02-11
Payer: OTHER GOVERNMENT

## 2025-02-11 VITALS
OXYGEN SATURATION: 97 % | SYSTOLIC BLOOD PRESSURE: 130 MMHG | TEMPERATURE: 98.8 F | RESPIRATION RATE: 16 BRPM | HEART RATE: 91 BPM | DIASTOLIC BLOOD PRESSURE: 78 MMHG

## 2025-02-11 DIAGNOSIS — J02.9 SORE THROAT: Primary | ICD-10-CM

## 2025-02-11 DIAGNOSIS — J06.9 URI, ACUTE: ICD-10-CM

## 2025-02-11 LAB — S PYO AG THROAT QL: NORMAL

## 2025-02-11 PROCEDURE — 87880 STREP A ASSAY W/OPTIC: CPT | Performed by: FAMILY MEDICINE

## 2025-02-11 PROCEDURE — 99213 OFFICE O/P EST LOW 20 MIN: CPT | Performed by: FAMILY MEDICINE

## 2025-02-11 RX ORDER — AMOXICILLIN 500 MG/1
500 CAPSULE ORAL 2 TIMES DAILY
Qty: 14 CAPSULE | Refills: 0 | Status: SHIPPED | OUTPATIENT
Start: 2025-02-11 | End: 2025-02-18

## 2025-02-11 ASSESSMENT — ENCOUNTER SYMPTOMS
COUGH: 1
EYE REDNESS: 0
SINUS PRESSURE: 0
RHINORRHEA: 0
COLOR CHANGE: 0
CHEST TIGHTNESS: 0
NAUSEA: 0
EYE ITCHING: 0
VOMITING: 0
ABDOMINAL PAIN: 0
WHEEZING: 0
DIARRHEA: 0
EYE DISCHARGE: 0

## 2025-02-11 NOTE — PROGRESS NOTES
SUBJECTIVE:    Patient ID: Ruth Sherman is a 44 y.o. female.    History of Present Illness  The patient presents for evaluation of a sore throat.    She reports experiencing a headache and a sore throat, which she describes as the most severe symptom. She also notes nasal congestion and a cough that is triggered by a scratchy sensation in her throat. The cough is non-productive and does not appear to be originating from her chest. She has been managing her symptoms with over-the-counter cold medication but has not used Mucinex due to the absence of fever or significant cough. She has been making efforts to maintain hydration. She experienced nausea the previous night but reports no episodes of vomiting or diarrhea. She reports no fever or body aches.      Past Medical History:   Diagnosis Date    Anxiety     Bell's palsy     Chronic migraine without aura, intractable, without status migrainosus 08/10/2017    CPAP (continuous positive airway pressure) dependence     Depression     Headache     Insomnia     Intractable migraine without aura and without status migrainosus 08/10/2017    Irritable bowel syndrome     Kidney stone 10/2017    Narcolepsy without cataplexy     JAMES (obstructive sleep apnea)     Osteoarthritis     Periodic limb movement disorder (PLMD)       Current Outpatient Medications on File Prior to Visit   Medication Sig Dispense Refill    eszopiclone 3 MG TABS Take 1 tablet by mouth nightly.      fluocinonide (LIDEX) 0.05 % external solution Apply topically daily      sucralfate (CARAFATE) 1 GM tablet Take 1 tablet by mouth 4 times daily      Clobetasol Propionate 0.05 % SHAM APPLY TO THE AFFEECTED AREA(S) ON THE SKIN TWICE A WEEK 118 mL 0    spironolactone (ALDACTONE) 50 MG tablet       citalopram (CELEXA) 10 MG tablet Take 1 tablet by mouth daily 90 tablet 1    lamoTRIgine (LAMICTAL) 25 MG tablet Take 1 tablet by mouth 2 times daily 180 tablet 1    cetirizine (ZYRTEC) 10 MG tablet Take 1 tablet by

## 2025-02-12 ENCOUNTER — TELEPHONE (OUTPATIENT)
Age: 45
End: 2025-02-12

## 2025-02-12 NOTE — TELEPHONE ENCOUNTER
Patient states she just got results back today that she had covid but has surgery on February 20th and has not be running a fever but is wanting to know if she is ok to go ahead with the surgery or if she has to reschedule. Please call patient for clarification

## 2025-02-13 NOTE — TELEPHONE ENCOUNTER
Spoke with patient. Patient does not have COVID-19. Patient's PCP said patient had a common cold and should be fine to proceed with sx.

## 2025-02-19 ENCOUNTER — PRE-ADMISSION TESTING (OUTPATIENT)
Dept: PREADMISSION TESTING | Facility: HOSPITAL | Age: 45
End: 2025-02-19
Payer: OTHER GOVERNMENT

## 2025-02-19 VITALS
HEART RATE: 72 BPM | RESPIRATION RATE: 18 BRPM | BODY MASS INDEX: 32.29 KG/M2 | DIASTOLIC BLOOD PRESSURE: 74 MMHG | SYSTOLIC BLOOD PRESSURE: 123 MMHG | OXYGEN SATURATION: 98 % | HEIGHT: 60 IN | WEIGHT: 164.46 LBS

## 2025-02-19 LAB
ANION GAP SERPL CALCULATED.3IONS-SCNC: 10 MMOL/L (ref 5–15)
BUN SERPL-MCNC: 15 MG/DL (ref 6–20)
BUN/CREAT SERPL: 30 (ref 7–25)
CALCIUM SPEC-SCNC: 8.5 MG/DL (ref 8.6–10.5)
CHLORIDE SERPL-SCNC: 105 MMOL/L (ref 98–107)
CO2 SERPL-SCNC: 23 MMOL/L (ref 22–29)
CREAT SERPL-MCNC: 0.5 MG/DL (ref 0.57–1)
DEPRECATED RDW RBC AUTO: 42 FL (ref 37–54)
EGFRCR SERPLBLD CKD-EPI 2021: 118.8 ML/MIN/1.73
ERYTHROCYTE [DISTWIDTH] IN BLOOD BY AUTOMATED COUNT: 12.7 % (ref 12.3–15.4)
GLUCOSE SERPL-MCNC: 108 MG/DL (ref 65–99)
HCT VFR BLD AUTO: 38.2 % (ref 34–46.6)
HGB BLD-MCNC: 12.5 G/DL (ref 12–15.9)
MCH RBC QN AUTO: 29.4 PG (ref 26.6–33)
MCHC RBC AUTO-ENTMCNC: 32.7 G/DL (ref 31.5–35.7)
MCV RBC AUTO: 89.9 FL (ref 79–97)
PLATELET # BLD AUTO: 285 10*3/MM3 (ref 140–450)
PMV BLD AUTO: 8.7 FL (ref 6–12)
POTASSIUM SERPL-SCNC: 3.9 MMOL/L (ref 3.5–5.2)
RBC # BLD AUTO: 4.25 10*6/MM3 (ref 3.77–5.28)
SODIUM SERPL-SCNC: 138 MMOL/L (ref 136–145)
WBC NRBC COR # BLD AUTO: 5.25 10*3/MM3 (ref 3.4–10.8)

## 2025-02-19 PROCEDURE — 80048 BASIC METABOLIC PNL TOTAL CA: CPT

## 2025-02-19 PROCEDURE — 36415 COLL VENOUS BLD VENIPUNCTURE: CPT

## 2025-02-19 PROCEDURE — 85027 COMPLETE CBC AUTOMATED: CPT

## 2025-02-19 RX ORDER — CETIRIZINE HYDROCHLORIDE 10 MG/1
10 TABLET ORAL DAILY
COMMUNITY

## 2025-02-19 NOTE — DISCHARGE INSTRUCTIONS
Preparing for Surgery  Follow these instructions before the procedure:  Several days or weeks before your procedure      Ask your health care provider about:  Changing or stopping your regular medicines. This is especially important if you are taking diabetes medicines or blood thinners.  Taking medicines such as aspirin and ibuprofen. These medicines can thin your blood. Do not take these medicines unless your health care provider tells you to take them.  Taking over-the-counter medicines, vitamins, herbs, and supplements.    Contact your surgeon if you:  Develop a fever of more than 100.4°F (38°C) or other feelings of illness during the 48 hours before your surgery.  Have symptoms that get worse.  Have questions or concerns about your surgery.  If you are going home the same day of your surgery you will need to arrange for a responsible adult, age 18 years old or older, to drive you home from the hospital and stay with you for 24 hours. Verification of the  will be made prior to any procedure requiring sedation. You may not go home in a taxi or any form of public transportation by yourself.     Day before your procedure    24 hours before your procedure DO NOT drink alcoholic beverages or smoke.  24 hours before your procedure STOP taking Erectile Dysfunction medication (i.e.,Cialis, Viagra)   You may be asked to shower with a germ-killing soap.  Day of your procedure   You may take the following medication(s) the morning of surgery with a sip of water:  Please take your Prilosec Morning of Surgery with a sip of Water, you may also take your Ativan if needed.      8 hours before your scheduled arrival time, STOP all food, any dairy products, and full liquids. This includes hard candy, chewing gum or mints. This is extremely important to prevent serious complications.     Up to 2 hours before your scheduled arrival time, you may have clear liquids no cream, powder, or pulp of any kind. Safe options are water,  black coffee, plain tea, soda, Gatorade/Powerade, clear broth, apple juice.    2 hours before your scheduled arrival time, STOP drinking clear liquids.    You may need to take another shower with a germ-killing soap before you leave home in the morning. Do not use perfumes, colognes, or body lotions.  Wear comfortable loose-fitting clothing.  Remove all jewelry including body piercing and rings, dark colored nail polish, and make up prior to arrival at the hospital. Leave all valuables at home.   Bring your hearing aids if you rely on them.  Do not wear contact lenses. If you wear eyeglasses remember to bring a case to store them in while you are in surgery.  Do not use denture adhesives since you will be asked to remove them during your surgery.    You do not need to bring your home medications into the hospital.   Bring your sleep apnea device with you on the day of your surgery (if this applies to you).  If you have an Inspire implant for sleep apnea, please bring the remote with you on the day of surgery.  If you wear portable oxygen, bring it with you.   If you are staying overnight, you may bring a bag of items you may need such as slippers, robe and a change of clothes for your discharge. You may want to leave these items in the car until you are ready for them since your family will take your belongings when you leave the pre-operative area.  Arrive at the hospital as scheduled by the office. You will be asked to arrive 2 hours prior to your surgery time in order to prepare for your procedure.  When you arrive at the hospital  Go to the registration desk located at the main entrance of the hospital.  After registration is completed, you will be given a beeper and a sticker sheet. Take the stickers to Outpatient Surgery and place in the tray at the end of the desk to notify the staff that you have arrived and registered.   Return to the lobby to wait. You are not always called back according to the time of  arrival but rather the time your doctor will be ready.  When your beeper lights up and vibrates proceed through the double doors, under the stairs, and a member of the Outpatient Surgery staff will escort you to your preoperative room.   How to Use Chlorhexidine Before Surgery  Chlorhexidine gluconate (CHG) is a germ-killing (antiseptic) solution that is used to clean the skin. It can get rid of the bacteria that normally live on the skin and can keep them away for about 24 hours. To clean your skin with CHG, you may be given:  A CHG solution to use in the shower or as part of a sponge bath.  A prepackaged cloth that contains CHG.  Cleaning your skin with CHG may help lower the risk for infection:  While you are staying in the intensive care unit of the hospital.  If you have a vascular access, such as a central line, to provide short-term or long-term access to your veins.  If you have a catheter to drain urine from your bladder.  If you are on a ventilator. A ventilator is a machine that helps you breathe by moving air in and out of your lungs.  After surgery.  What are the risks?  Risks of using CHG include:  A skin reaction.  Hearing loss, if CHG gets in your ears and you have a perforated eardrum.  Eye injury, if CHG gets in your eyes and is not rinsed out.  The CHG product catching fire.  Make sure that you avoid smoking and flames after applying CHG to your skin.  Do not use CHG:  If you have a chlorhexidine allergy or have previously reacted to chlorhexidine.  On babies younger than 2 months of age.  How to use CHG solution  Use CHG only as told by your health care provider, and follow the instructions on the label.  Use the full amount of CHG as directed. Usually, this is one bottle.  During a shower    Follow these steps when using CHG solution during a shower (unless your health care provider gives you different instructions):  Start the shower.  Use your normal soap and shampoo to wash your face and  hair.  Turn off the shower or move out of the shower stream.  Pour the CHG onto a clean washcloth. Do not use any type of brush or rough-edged sponge.  Starting at your neck, lather your body down to your toes. Make sure you follow these instructions:  If you will be having surgery, pay special attention to the part of your body where you will be having surgery. Scrub this area for at least 1 minute.  Do not use CHG on your head or face. If the solution gets into your ears or eyes, rinse them well with water.  Avoid your genital area.  Avoid any areas of skin that have broken skin, cuts, or scrapes.  Scrub your back and under your arms. Make sure to wash skin folds.  Let the lather sit on your skin for 1-2 minutes or as long as told by your health care provider.  Thoroughly rinse your entire body in the shower. Make sure that all body creases and crevices are rinsed well.  Dry off with a clean towel. Do not put any substances on your body afterward--such as powder, lotion, or perfume--unless you are told to do so by your health care provider. Only use lotions that are recommended by the .  Put on clean clothes or pajamas.  If it is the night before your surgery, sleep in clean sheets.     During a sponge bath  Follow these steps when using CHG solution during a sponge bath (unless your health care provider gives you different instructions):  Use your normal soap and shampoo to wash your face and hair.  Pour the CHG onto a clean washcloth.  Starting at your neck, lather your body down to your toes. Make sure you follow these instructions:  If you will be having surgery, pay special attention to the part of your body where you will be having surgery. Scrub this area for at least 1 minute.  Do not use CHG on your head or face. If the solution gets into your ears or eyes, rinse them well with water.  Avoid your genital area.  Avoid any areas of skin that have broken skin, cuts, or scrapes.  Scrub your back  and under your arms. Make sure to wash skin folds.  Let the lather sit on your skin for 1-2 minutes or as long as told by your health care provider.  Using a different clean, wet washcloth, thoroughly rinse your entire body. Make sure that all body creases and crevices are rinsed well.  Dry off with a clean towel. Do not put any substances on your body afterward--such as powder, lotion, or perfume--unless you are told to do so by your health care provider. Only use lotions that are recommended by the .  Put on clean clothes or pajamas.  If it is the night before your surgery, sleep in clean sheets.  How to use CHG prepackaged cloths  Only use CHG cloths as told by your health care provider, and follow the instructions on the label.  Use the CHG cloth on clean, dry skin.  Do not use the CHG cloth on your head or face unless your health care provider tells you to.  When washing with the CHG cloth:  Avoid your genital area.  Avoid any areas of skin that have broken skin, cuts, or scrapes.  Before surgery    Follow these steps when using a CHG cloth to clean before surgery (unless your health care provider gives you different instructions):  Using the CHG cloth, vigorously scrub the part of your body where you will be having surgery. Scrub using a back-and-forth motion for 3 minutes. The area on your body should be completely wet with CHG when you are done scrubbing.  Do not rinse. Discard the cloth and let the area air-dry. Do not put any substances on the area afterward, such as powder, lotion, or perfume.  Put on clean clothes or pajamas.  If it is the night before your surgery, sleep in clean sheets.     For general bathing  Follow these steps when using CHG cloths for general bathing (unless your health care provider gives you different instructions).  Use a separate CHG cloth for each area of your body. Make sure you wash between any folds of skin and between your fingers and toes. Wash your body in  the following order, switching to a new cloth after each step:  The front of your neck, shoulders, and chest.  Both of your arms, under your arms, and your hands.  Your stomach and groin area, avoiding the genitals.  Your right leg and foot.  Your left leg and foot.  The back of your neck, your back, and your buttocks.  Do not rinse. Discard the cloth and let the area air-dry. Do not put any substances on your body afterward--such as powder, lotion, or perfume--unless you are told to do so by your health care provider. Only use lotions that are recommended by the .  Put on clean clothes or pajamas.  Contact a health care provider if:  Your skin gets irritated after scrubbing.  You have questions about using your solution or cloth.  You swallow any chlorhexidine. Call your local poison control center (1-368.312.9114 in the [place]  Get help right away if:  Your eyes itch badly, or they become very red or swollen.  Your skin itches badly and is red or swollen.  Your hearing changes.  You have trouble seeing.  You have swelling or tingling in your mouth or throat.  You have trouble breathing.  These symptoms may represent a serious problem that is an emergency. Do not wait to see if the symptoms will go away. Get medical help right away. Call your local emergency services (397 in the [place] Do not drive yourself to the hospital.  Summary  Chlorhexidine gluconate (CHG) is a germ-killing (antiseptic) solution that is used to clean the skin. Cleaning your skin with CHG may help to lower your risk for infection.  You may be given CHG to use for bathing. It may be in a bottle or in a prepackaged cloth to use on your skin. Carefully follow your health care provider's instructions and the instructions on the product label.  Do not use CHG if you have a chlorhexidine allergy.  Contact your health care provider if your skin gets irritated after scrubbing.  This information is not intended to replace advice given to  you by your health care provider. Make sure you discuss any questions you have with your health care provider.  Document Revised: 04/17/2023 Document Reviewed: 02/28/2022  Elsevier Patient Education © 2023 [employer]

## 2025-02-20 ENCOUNTER — HOSPITAL ENCOUNTER (OUTPATIENT)
Facility: HOSPITAL | Age: 45
Setting detail: HOSPITAL OUTPATIENT SURGERY
Discharge: HOME | End: 2025-02-20
Attending: PODIATRIST | Admitting: PODIATRIST
Payer: OTHER GOVERNMENT

## 2025-02-20 ENCOUNTER — ANESTHESIA EVENT (OUTPATIENT)
Dept: PERIOP | Facility: HOSPITAL | Age: 45
End: 2025-02-20
Payer: OTHER GOVERNMENT

## 2025-02-20 ENCOUNTER — ANESTHESIA (OUTPATIENT)
Dept: PERIOP | Facility: HOSPITAL | Age: 45
End: 2025-02-20
Payer: OTHER GOVERNMENT

## 2025-02-20 VITALS
DIASTOLIC BLOOD PRESSURE: 83 MMHG | OXYGEN SATURATION: 100 % | SYSTOLIC BLOOD PRESSURE: 114 MMHG | HEART RATE: 75 BPM | RESPIRATION RATE: 18 BRPM | TEMPERATURE: 98 F

## 2025-02-20 DIAGNOSIS — M95.8 OSTEOCHONDRAL DEFECT OF TALUS: Primary | ICD-10-CM

## 2025-02-20 PROCEDURE — 25010000002 ONDANSETRON PER 1 MG

## 2025-02-20 PROCEDURE — 25010000002 FENTANYL CITRATE (PF) 100 MCG/2ML SOLUTION

## 2025-02-20 PROCEDURE — 25010000002 FENTANYL CITRATE (PF) 50 MCG/ML SOLUTION: Performed by: ANESTHESIOLOGY

## 2025-02-20 PROCEDURE — 25810000003 LACTATED RINGERS PER 1000 ML: Performed by: PODIATRIST

## 2025-02-20 PROCEDURE — 25010000002 MIDAZOLAM PER 1MG: Performed by: ANESTHESIOLOGY

## 2025-02-20 PROCEDURE — 25010000002 DEXAMETHASONE PER 1 MG

## 2025-02-20 PROCEDURE — 25010000002 PROPOFOL 10 MG/ML EMULSION

## 2025-02-20 PROCEDURE — 25010000002 DEXAMETHASONE PER 1 MG: Performed by: ANESTHESIOLOGY

## 2025-02-20 PROCEDURE — 25010000002 CEFAZOLIN PER 500 MG: Performed by: PODIATRIST

## 2025-02-20 PROCEDURE — C1713 ANCHOR/SCREW BN/BN,TIS/BN: HCPCS | Performed by: PODIATRIST

## 2025-02-20 PROCEDURE — 25010000002 LIDOCAINE PF 2% 2 % SOLUTION

## 2025-02-20 PROCEDURE — 25010000002 ROPIVACAINE PER 1 MG: Performed by: ANESTHESIOLOGY

## 2025-02-20 DEVICE — ALLOGRFT ECM BIOCARTILAGE 1CC: Type: IMPLANTABLE DEVICE | Site: ANKLE | Status: FUNCTIONAL

## 2025-02-20 RX ORDER — ACETAMINOPHEN 500 MG
1000 TABLET ORAL ONCE
Status: COMPLETED | OUTPATIENT
Start: 2025-02-20 | End: 2025-02-20

## 2025-02-20 RX ORDER — LIDOCAINE HYDROCHLORIDE 10 MG/ML
0.5 INJECTION, SOLUTION EPIDURAL; INFILTRATION; INTRACAUDAL; PERINEURAL ONCE AS NEEDED
Status: DISCONTINUED | OUTPATIENT
Start: 2025-02-20 | End: 2025-02-20 | Stop reason: HOSPADM

## 2025-02-20 RX ORDER — MAGNESIUM HYDROXIDE 1200 MG/15ML
LIQUID ORAL AS NEEDED
Status: DISCONTINUED | OUTPATIENT
Start: 2025-02-20 | End: 2025-02-20 | Stop reason: HOSPADM

## 2025-02-20 RX ORDER — SODIUM CHLORIDE 0.9 % (FLUSH) 0.9 %
3-10 SYRINGE (ML) INJECTION AS NEEDED
Status: DISCONTINUED | OUTPATIENT
Start: 2025-02-20 | End: 2025-02-20 | Stop reason: HOSPADM

## 2025-02-20 RX ORDER — MIDAZOLAM HYDROCHLORIDE 2 MG/2ML
2 INJECTION, SOLUTION INTRAMUSCULAR; INTRAVENOUS ONCE
Status: COMPLETED | OUTPATIENT
Start: 2025-02-20 | End: 2025-02-20

## 2025-02-20 RX ORDER — DEXAMETHASONE SODIUM PHOSPHATE 4 MG/ML
4 INJECTION, SOLUTION INTRA-ARTICULAR; INTRALESIONAL; INTRAMUSCULAR; INTRAVENOUS; SOFT TISSUE ONCE AS NEEDED
Status: COMPLETED | OUTPATIENT
Start: 2025-02-20 | End: 2025-02-20

## 2025-02-20 RX ORDER — EPHEDRINE SULFATE 50 MG/ML
INJECTION INTRAVENOUS AS NEEDED
Status: DISCONTINUED | OUTPATIENT
Start: 2025-02-20 | End: 2025-02-20 | Stop reason: SURG

## 2025-02-20 RX ORDER — SODIUM CHLORIDE, SODIUM LACTATE, POTASSIUM CHLORIDE, CALCIUM CHLORIDE 600; 310; 30; 20 MG/100ML; MG/100ML; MG/100ML; MG/100ML
1000 INJECTION, SOLUTION INTRAVENOUS CONTINUOUS
Status: DISCONTINUED | OUTPATIENT
Start: 2025-02-20 | End: 2025-02-20 | Stop reason: HOSPADM

## 2025-02-20 RX ORDER — FLUMAZENIL 0.1 MG/ML
0.2 INJECTION INTRAVENOUS AS NEEDED
Status: DISCONTINUED | OUTPATIENT
Start: 2025-02-20 | End: 2025-02-20 | Stop reason: HOSPADM

## 2025-02-20 RX ORDER — PROPOFOL 10 MG/ML
VIAL (ML) INTRAVENOUS AS NEEDED
Status: DISCONTINUED | OUTPATIENT
Start: 2025-02-20 | End: 2025-02-20 | Stop reason: SURG

## 2025-02-20 RX ORDER — NALOXONE HCL 0.4 MG/ML
0.4 VIAL (ML) INJECTION AS NEEDED
Status: DISCONTINUED | OUTPATIENT
Start: 2025-02-20 | End: 2025-02-20 | Stop reason: HOSPADM

## 2025-02-20 RX ORDER — SODIUM CHLORIDE 9 MG/ML
40 INJECTION, SOLUTION INTRAVENOUS AS NEEDED
Status: DISCONTINUED | OUTPATIENT
Start: 2025-02-20 | End: 2025-02-20 | Stop reason: HOSPADM

## 2025-02-20 RX ORDER — OXYCODONE AND ACETAMINOPHEN 7.5; 325 MG/1; MG/1
1 TABLET ORAL EVERY 4 HOURS PRN
Qty: 18 TABLET | Refills: 0 | Status: SHIPPED | OUTPATIENT
Start: 2025-02-20

## 2025-02-20 RX ORDER — HYDROCODONE BITARTRATE AND ACETAMINOPHEN 5; 325 MG/1; MG/1
1 TABLET ORAL EVERY 4 HOURS PRN
Status: DISCONTINUED | OUTPATIENT
Start: 2025-02-20 | End: 2025-02-20 | Stop reason: HOSPADM

## 2025-02-20 RX ORDER — FENTANYL CITRATE 50 UG/ML
50 INJECTION, SOLUTION INTRAMUSCULAR; INTRAVENOUS ONCE
Status: COMPLETED | OUTPATIENT
Start: 2025-02-20 | End: 2025-02-20

## 2025-02-20 RX ORDER — FENTANYL CITRATE 50 UG/ML
50 INJECTION, SOLUTION INTRAMUSCULAR; INTRAVENOUS
Status: DISCONTINUED | OUTPATIENT
Start: 2025-02-20 | End: 2025-02-20 | Stop reason: HOSPADM

## 2025-02-20 RX ORDER — ONDANSETRON 2 MG/ML
INJECTION INTRAMUSCULAR; INTRAVENOUS AS NEEDED
Status: DISCONTINUED | OUTPATIENT
Start: 2025-02-20 | End: 2025-02-20 | Stop reason: SURG

## 2025-02-20 RX ORDER — IBUPROFEN 600 MG/1
600 TABLET, FILM COATED ORAL EVERY 6 HOURS PRN
Status: DISCONTINUED | OUTPATIENT
Start: 2025-02-20 | End: 2025-02-20 | Stop reason: HOSPADM

## 2025-02-20 RX ORDER — ROPIVACAINE HYDROCHLORIDE 5 MG/ML
INJECTION, SOLUTION EPIDURAL; INFILTRATION; PERINEURAL
Status: COMPLETED | OUTPATIENT
Start: 2025-02-20 | End: 2025-02-20

## 2025-02-20 RX ORDER — DOCUSATE SODIUM 100 MG/1
100 CAPSULE, LIQUID FILLED ORAL 2 TIMES DAILY
Qty: 60 CAPSULE | Refills: 0 | Status: SHIPPED | OUTPATIENT
Start: 2025-02-20 | End: 2025-03-22

## 2025-02-20 RX ORDER — FAMOTIDINE 10 MG/ML
20 INJECTION, SOLUTION INTRAVENOUS
Status: COMPLETED | OUTPATIENT
Start: 2025-02-20 | End: 2025-02-20

## 2025-02-20 RX ORDER — LABETALOL HYDROCHLORIDE 5 MG/ML
5 INJECTION, SOLUTION INTRAVENOUS
Status: DISCONTINUED | OUTPATIENT
Start: 2025-02-20 | End: 2025-02-20 | Stop reason: HOSPADM

## 2025-02-20 RX ORDER — HYDROCODONE BITARTRATE AND ACETAMINOPHEN 10; 325 MG/1; MG/1
1 TABLET ORAL EVERY 4 HOURS PRN
Status: DISCONTINUED | OUTPATIENT
Start: 2025-02-20 | End: 2025-02-20 | Stop reason: HOSPADM

## 2025-02-20 RX ORDER — ONDANSETRON 4 MG/1
4 TABLET, FILM COATED ORAL EVERY 4 HOURS
Qty: 16 TABLET | Refills: 0 | Status: SHIPPED | OUTPATIENT
Start: 2025-02-20

## 2025-02-20 RX ORDER — FENTANYL CITRATE 50 UG/ML
INJECTION, SOLUTION INTRAMUSCULAR; INTRAVENOUS AS NEEDED
Status: DISCONTINUED | OUTPATIENT
Start: 2025-02-20 | End: 2025-02-20 | Stop reason: SURG

## 2025-02-20 RX ORDER — SODIUM CHLORIDE, SODIUM LACTATE, POTASSIUM CHLORIDE, CALCIUM CHLORIDE 600; 310; 30; 20 MG/100ML; MG/100ML; MG/100ML; MG/100ML
100 INJECTION, SOLUTION INTRAVENOUS CONTINUOUS
Status: DISCONTINUED | OUTPATIENT
Start: 2025-02-20 | End: 2025-02-20 | Stop reason: HOSPADM

## 2025-02-20 RX ORDER — SODIUM CHLORIDE 0.9 % (FLUSH) 0.9 %
3 SYRINGE (ML) INJECTION EVERY 12 HOURS SCHEDULED
Status: DISCONTINUED | OUTPATIENT
Start: 2025-02-20 | End: 2025-02-20 | Stop reason: HOSPADM

## 2025-02-20 RX ORDER — SCOPOLAMINE 1 MG/3D
1 PATCH, EXTENDED RELEASE TRANSDERMAL ONCE
Status: DISCONTINUED | OUTPATIENT
Start: 2025-02-20 | End: 2025-02-20 | Stop reason: HOSPADM

## 2025-02-20 RX ORDER — SODIUM CHLORIDE 0.9 % (FLUSH) 0.9 %
3 SYRINGE (ML) INJECTION AS NEEDED
Status: DISCONTINUED | OUTPATIENT
Start: 2025-02-20 | End: 2025-02-20 | Stop reason: HOSPADM

## 2025-02-20 RX ORDER — DEXAMETHASONE SODIUM PHOSPHATE 4 MG/ML
INJECTION, SOLUTION INTRA-ARTICULAR; INTRALESIONAL; INTRAMUSCULAR; INTRAVENOUS; SOFT TISSUE AS NEEDED
Status: DISCONTINUED | OUTPATIENT
Start: 2025-02-20 | End: 2025-02-20 | Stop reason: SURG

## 2025-02-20 RX ORDER — LIDOCAINE HYDROCHLORIDE 20 MG/ML
INJECTION, SOLUTION EPIDURAL; INFILTRATION; INTRACAUDAL; PERINEURAL AS NEEDED
Status: DISCONTINUED | OUTPATIENT
Start: 2025-02-20 | End: 2025-02-20 | Stop reason: SURG

## 2025-02-20 RX ORDER — ONDANSETRON 2 MG/ML
4 INJECTION INTRAMUSCULAR; INTRAVENOUS ONCE AS NEEDED
Status: DISCONTINUED | OUTPATIENT
Start: 2025-02-20 | End: 2025-02-20 | Stop reason: HOSPADM

## 2025-02-20 RX ADMIN — FENTANYL CITRATE 50 MCG: 50 INJECTION, SOLUTION INTRAMUSCULAR; INTRAVENOUS at 11:36

## 2025-02-20 RX ADMIN — EPHEDRINE SULFATE 10 MG: 50 INJECTION INTRAVENOUS at 10:56

## 2025-02-20 RX ADMIN — SCOLOPAMINE TRANSDERMAL SYSTEM 1 PATCH: 1 PATCH, EXTENDED RELEASE TRANSDERMAL at 09:40

## 2025-02-20 RX ADMIN — FENTANYL CITRATE 50 MCG: 50 INJECTION, SOLUTION INTRAMUSCULAR; INTRAVENOUS at 10:15

## 2025-02-20 RX ADMIN — ROPIVACAINE HYDROCHLORIDE 20 ML: 5 INJECTION EPIDURAL; INFILTRATION; PERINEURAL at 09:49

## 2025-02-20 RX ADMIN — DEXAMETHASONE SODIUM PHOSPHATE 4 MG: 4 INJECTION, SOLUTION INTRA-ARTICULAR; INTRALESIONAL; INTRAMUSCULAR; INTRAVENOUS; SOFT TISSUE at 11:34

## 2025-02-20 RX ADMIN — FENTANYL CITRATE 50 MCG: 50 INJECTION, SOLUTION INTRAMUSCULAR; INTRAVENOUS at 09:46

## 2025-02-20 RX ADMIN — LIDOCAINE HYDROCHLORIDE 60 MG: 20 INJECTION, SOLUTION EPIDURAL; INFILTRATION; INTRACAUDAL; PERINEURAL at 10:15

## 2025-02-20 RX ADMIN — MIDAZOLAM HYDROCHLORIDE 2 MG: 1 INJECTION, SOLUTION INTRAMUSCULAR; INTRAVENOUS at 09:46

## 2025-02-20 RX ADMIN — CEFAZOLIN 2000 MG: 2 INJECTION, POWDER, FOR SOLUTION INTRAMUSCULAR; INTRAVENOUS at 10:18

## 2025-02-20 RX ADMIN — SODIUM CHLORIDE, POTASSIUM CHLORIDE, SODIUM LACTATE AND CALCIUM CHLORIDE 1000 ML: 600; 310; 30; 20 INJECTION, SOLUTION INTRAVENOUS at 07:15

## 2025-02-20 RX ADMIN — ROPIVACAINE HYDROCHLORIDE 20 ML: 5 INJECTION, SOLUTION EPIDURAL; INFILTRATION; PERINEURAL at 09:52

## 2025-02-20 RX ADMIN — FAMOTIDINE 20 MG: 10 INJECTION INTRAVENOUS at 09:40

## 2025-02-20 RX ADMIN — ONDANSETRON 4 MG: 2 INJECTION INTRAMUSCULAR; INTRAVENOUS at 11:34

## 2025-02-20 RX ADMIN — PROPOFOL 200 MG: 10 INJECTION, EMULSION INTRAVENOUS at 10:15

## 2025-02-20 RX ADMIN — DEXAMETHASONE SODIUM PHOSPHATE 4 MG: 4 INJECTION, SOLUTION INTRA-ARTICULAR; INTRALESIONAL; INTRAMUSCULAR; INTRAVENOUS; SOFT TISSUE at 09:40

## 2025-02-20 RX ADMIN — ACETAMINOPHEN 1000 MG: 500 TABLET, FILM COATED ORAL at 09:40

## 2025-02-20 NOTE — ANESTHESIA PROCEDURE NOTES
Airway  Date/Time: 2/20/2025 10:16 AM  Airway not difficult    General Information and Staff    Patient location during procedure: OR  CRNA/CAA: Dee Dee Castro CRNA    Indications and Patient Condition    Preoxygenated: yes  Mask difficulty assessment: 0 - not attempted    Final Airway Details  Final airway type: supraglottic airway      Successful airway: I-gel  Size 4     Number of attempts at approach: 1  Assessment: lips, teeth, and gum same as pre-op and atraumatic intubation

## 2025-02-20 NOTE — ANESTHESIA PREPROCEDURE EVALUATION
Anesthesia Evaluation     history of anesthetic complications:  PONV  NPO Solid Status: > 8 hours  NPO Liquid Status: > 8 hours           Airway   Mallampati: II  No difficulty expected  Dental      Pulmonary    (+) ,sleep apnea on CPAP  (-) not a smoker  Cardiovascular   Exercise tolerance: good (4-7 METS)    (-) hypertension, CAD      Neuro/Psych  (+) headaches, psychiatric history Anxiety and Depression  (-) seizures, TIA, CVA  GI/Hepatic/Renal/Endo    (+) obesity, GERD, renal disease- stones  (-) liver disease, diabetes    Musculoskeletal     Abdominal    Substance History      OB/GYN          Other   arthritis,                 Anesthesia Plan    ASA 2     general with block     intravenous induction     Anesthetic plan, risks, benefits, and alternatives have been provided, discussed and informed consent has been obtained with: patient.    CODE STATUS:

## 2025-02-20 NOTE — OP NOTE
Signed       ANKLE ARTHROSCOPY  Progress Note     Crystal Waldrop  2/20/2025     Pre-op Diagnosis:   Joint derangement ankle, loose body right ankle, osteochondral defect talus       Post-Op Diagnosis Codes:     * Osteochondral defect of talus [M95.8]     * Joint derangement of ankle or foot [M24.173, M24.176]     * Loose body in right ankle [M24.071]     Procedure(s):        Procedure(s):  1) ANKLE ARTHROSCOPY WITH REMOVAL OF LOOSE BOdY and MICROFRACTURE OF TALUS WITH CARTLIAGE GRAFTING.      The right ankle was placed under distraction.  An anteromedial portal was created and the trocar cannula was introduced.  The camera was introduced.  There was significant anterior soft tissue impingement.  There was a loose body at the anterior medial portion of the ankle.  A lateral portal was created under direct visualization.  A grasper was then used to grasp that loose body and remove it.  Underlying that area there continue to be a significant articular cartilage defect.  The camera was then introduced laterally and the remainder of the joint was inspected without any significant damage to the articular surface.  A curette was then introduced medially and the loose cartilage was removed.  There was a mild amount of subchondral bone loss.  The lesion did extend all the way down to the shoulder of the talus.  A thermal wand was then used to create hemostasis and debride any remaining inflammatory tissue.  It was used to smooth the borders of the lesion.  A smart shot marrow access device was then used to microfracture the defect in the talus.  There was a small defect at the dorsal aspect of the tibia as well.  This was cleaned with a curette and microfractured as well.  The joint was then suctioned dry.  An anterior fat pad retractor was placed.  Arthrex bio cartilage was placed over the defect under direct visualization.  Fibrin glue was placed over the defect.  This was let to sit for at least 8 minutes.  The  fat pad retractor was then removed.  Ankle was placed for range of motion.  Skin was repaired with 3-0 nylon.  Well-padded compression bandage with posterior splint was applied.              Surgeon(s):  Randy Collado DPM     Anesthesia: General with Block     Staff:   Circulator: Viki Renner RN  Scrub Person: Chela Hewitt; Emily Pina  Vendor Representative: Leon Rios; Petra Mendes        Estimated Blood Loss: none     Urine Voided: * No values recorded between 2/20/2025 10:13 AM and 2/20/2025 11:41 AM *     Specimens:                None      Drains: * No LDAs found *     Findings: Large osteochondral defect medial talar dome with loose body at the anteromedial ankle        Complications: None        was responsible for performing the following activities: Closing and their skilled assistance was necessary for the success of this case.     Randy Collado DPM      Date: 2/20/2025  Time: 11:41 CST

## 2025-02-20 NOTE — ANESTHESIA PROCEDURE NOTES
Peripheral Block    Pre-sedation assessment completed: 2/20/2025 9:43 AM    Patient reassessed immediately prior to procedure    Patient location during procedure: pre-op  Start time: 2/20/2025 9:48 AM  Stop time: 2/20/2025 9:49 AM  Reason for block: procedure for pain, at surgeon's request, post-op pain management and Requested by Dr. Collado  Performed by  Anesthesiologist: Shruthi Dunlap MD  Preanesthetic Checklist  Completed: patient identified, IV checked, site marked, risks and benefits discussed, surgical consent, monitors and equipment checked, pre-op evaluation and timeout performed  Prep:  Prep: ChloraPrep  Patient monitoring: blood pressure monitoring, continuous pulse oximetry and EKG  Procedure    Sedation: yes    Guidance:ultrasound guided and Sciatic nerve identified and local anesthetic seen surrounding nerve    ULTRASOUND INTERPRETATION.  Using ultrasound guidance a 20 G gauge needle was placed in close proximity to the nerve, at which point, under ultrasound guidance anesthetic was injected in the area of the nerve and spread of the anesthesia was seen on ultrasound in close proximity thereto.  There were no abnormalities seen on ultrasound; a digital image was taken; and the patient tolerated the procedure with no complications. Images:still images obtained (picture printed and placed in patients chart)    Laterality:right  Block Type:sciatic and popliteal  Injection Technique:single-shot  Needle Type:echogenic      Medications Used: ropivacaine (NAROPIN) injection 0.5 % - Injection   20 mL - 2/20/2025 9:49:00 AM      Post Assessment  Injection Assessment: negative aspiration for heme, no paresthesia on injection and incremental injection  Patient Tolerance:comfortable throughout block  Complications:no  Performed by: Shruthi Dunlap MD

## 2025-02-20 NOTE — ANESTHESIA PROCEDURE NOTES
Peripheral Block    Pre-sedation assessment completed: 2/20/2025 9:43 AM    Patient reassessed immediately prior to procedure    Patient location during procedure: pre-op  Start time: 2/20/2025 9:51 AM  Stop time: 2/20/2025 9:52 AM  Reason for block: procedure for pain, at surgeon's request, post-op pain management and Requested by Dr. Collado  Performed by  Anesthesiologist: Shruthi Dunlap MD  Preanesthetic Checklist  Completed: patient identified, IV checked, site marked, risks and benefits discussed, surgical consent, monitors and equipment checked, pre-op evaluation and timeout performed  Prep:  Pt Position: supine  Prep: ChloraPrep  Patient monitoring: blood pressure monitoring, continuous pulse oximetry and EKG  Procedure    Sedation: yes    Guidance:ultrasound guided and femoral artery identified in adductor canal and local anesthetic seen surrounding artery    ULTRASOUND INTERPRETATION.  Using ultrasound guidance a 20 G gauge needle was placed in close proximity to the nerve, at which point, under ultrasound guidance anesthetic was injected in the area of the nerve and spread of the anesthesia was seen on ultrasound in close proximity thereto.  There were no abnormalities seen on ultrasound; a digital image was taken; and the patient tolerated the procedure with no complications. Images:still images obtained (picture printed and placed in patients chart)    Laterality:right  Block Type:adductor canal block  Injection Technique:single-shot  Needle Type:echogenic      Medications Used: ropivacaine (NAROPIN) injection 0.5 % - Injection   20 mL - 2/20/2025 9:52:00 AM      Post Assessment  Injection Assessment: negative aspiration for heme, no paresthesia on injection and incremental injection  Patient Tolerance:comfortable throughout block  Complications:no  Performed by: Shruthi Dunlap MD

## 2025-02-20 NOTE — BRIEF OP NOTE
ANKLE ARTHROSCOPY  Progress Note    Crystal Waldrop  2/20/2025    Pre-op Diagnosis:   Joint derangement ankle, loose body right ankle, osteochondral defect talus       Post-Op Diagnosis Codes:     * Osteochondral defect of talus [M95.8]     * Joint derangement of ankle or foot [M24.173, M24.176]     * Loose body in right ankle [M24.071]    Procedure(s):      Procedure(s):  1) ANKLE ARTHROSCOPY WITH REMOVAL OF LOOSE BOdY and MICROFRACTURE OF TALUS WITH CARTLIAGE GRAFTING.     The right ankle was placed under distraction.  An anteromedial portal was created and the trocar cannula was introduced.  The camera was introduced.  There was significant anterior soft tissue impingement.  There was a loose body at the anterior medial portion of the ankle.  A lateral portal was created under direct visualization.  A grasper was then used to grasp that loose body and remove it.  Underlying that area there continue to be a significant articular cartilage defect.  The camera was then introduced laterally and the remainder of the joint was inspected without any significant damage to the articular surface.  A curette was then introduced medially and the loose cartilage was removed.  There was a mild amount of subchondral bone loss.  The lesion did extend all the way down to the shoulder of the talus.  A thermal wand was then used to create hemostasis and debride any remaining inflammatory tissue.  It was used to smooth the borders of the lesion.  A smart shot marrow access device was then used to microfracture the defect in the talus.  There was a small defect at the dorsal aspect of the tibia as well.  This was cleaned with a curette and microfractured as well.  The joint was then suctioned dry.  An anterior fat pad retractor was placed.  Arthrex bio cartilage was placed over the defect under direct visualization.  Fibrin glue was placed over the defect.  This was let to sit for at least 8 minutes.  The fat pad retractor was then  removed.  Ankle was placed for range of motion.  Skin was repaired with 3-0 nylon.  Well-padded compression bandage with posterior splint was applied.          Surgeon(s):  Randy Collado DPM    Anesthesia: General with Block    Staff:   Circulator: Viki Renner RN  Scrub Person: Chela Hewitt; Emily Pina  Vendor Representative: Leon Rios; Petra Mendes       Estimated Blood Loss: none    Urine Voided: * No values recorded between 2/20/2025 10:13 AM and 2/20/2025 11:41 AM *    Specimens:                None      Drains: * No LDAs found *    Findings: Large osteochondral defect medial talar dome with loose body at the anteromedial ankle      Complications: None       was responsible for performing the following activities: Closing and their skilled assistance was necessary for the success of this case.    Randy Collado DPM     Date: 2/20/2025  Time: 11:41 CST

## 2025-02-20 NOTE — ANESTHESIA POSTPROCEDURE EVALUATION
Patient: Crystal Waldrop    Procedure Summary       Date: 02/20/25 Room / Location: [facility] OR [address] [place] [address] PAD OR    Anesthesia Start: 1013 Anesthesia Stop: 1153    Procedure: ANKLE ARTHROSCOPY WITH POSSIBLE REMOVAL OF LOOSE BOSY POSSIBLE MICROFRACTURE OF HER TALUS WITH CARTLIAGE GRAFTING. POSSIBLE ANTERIOR MEDIAL ARTHROTOMY IF HER LOOSE BODY IS NOT ABLE TO BE REMOVED THROGH THE SCOPE. (Right: Ankle) Diagnosis:       Osteochondral defect of talus      Joint derangement of ankle or foot      Loose body in right ankle      (Joint derangement ankle, loose body right ankle, osteochondral defect talus)    Surgeons: Randy Collado DPM Provider: Dee Dee Castro CRNA    Anesthesia Type: general with block ASA Status: 2            Anesthesia Type: general with block    Vitals  Vitals Value Taken Time   /77 02/20/25 1210   Temp 98 °F (36.7 °C) 02/20/25 1210   Pulse 68 02/20/25 1210   Resp 15 02/20/25 1210   SpO2 100 % 02/20/25 1210           Post Anesthesia Care and Evaluation    Patient location during evaluation: PACU  Patient participation: complete - patient participated  Level of consciousness: awake and alert  Pain management: adequate    Airway patency: patent  Anesthetic complications: No anesthetic complications    Cardiovascular status: acceptable  Respiratory status: acceptable  Hydration status: acceptable    Comments: Blood pressure 115/64, pulse 77, temperature 98 °F (36.7 °C), temperature source Temporal, resp. rate 18, SpO2 100%, not currently breastfeeding.    Pt discharged from PACU based on charanjit score >8

## 2025-02-27 ENCOUNTER — OFFICE VISIT (OUTPATIENT)
Age: 45
End: 2025-02-27

## 2025-02-27 DIAGNOSIS — Z47.89 AFTERCARE FOLLOWING SURGERY OF THE MUSCULOSKELETAL SYSTEM: Primary | ICD-10-CM

## 2025-02-27 PROCEDURE — 99024 POSTOP FOLLOW-UP VISIT: CPT | Performed by: NURSE PRACTITIONER

## 2025-02-27 RX ORDER — ENOXAPARIN SODIUM 100 MG/ML
40 INJECTION SUBCUTANEOUS DAILY
Qty: 30 EACH | Refills: 0 | Status: SHIPPED | OUTPATIENT
Start: 2025-02-27

## 2025-02-27 NOTE — PROGRESS NOTES
[facility]  [facility]  [address]  [facility] [id number] [phone]     Patient: Ruth Sherman   YOB: 1980   Date: 2/27/2025   Visit Type:  Post Op Right Ankle    History of Present Illness  Chief Complaint   Patient presents with    Post Op Right Ankle     Patient is 1 week status post ankle arthroscopy with removal loose body, microfracture of the talus with cartilage grafting.  She is nonweightbearing.  Pain controlled.  Denies any fever or chills.  Date of procedure 2/20/2025.    Past Medical History:   Diagnosis Date    Anxiety     Bell's palsy     Chronic migraine without aura, intractable, without status migrainosus 08/10/2017    CPAP (continuous positive airway pressure) dependence     Depression     Headache     Insomnia     Intractable migraine without aura and without status migrainosus 08/10/2017    Irritable bowel syndrome     Kidney stone 10/2017    Narcolepsy without cataplexy     JAMES (obstructive sleep apnea)     Osteoarthritis     Periodic limb movement disorder (PLMD)       Past Surgical History:   Procedure Laterality Date    ANKLE SURGERY Right     CHOLECYSTECTOMY  05/10/2023    CYSTOURETHROSCOPY/STENT REMOVAL      HYSTERECTOMY (CERVIX STATUS UNKNOWN) N/A 11/21/2019    TOTAL LAPAROSCOPIC HYSTERECTOMY WITH DAVINCI CYSTOSCOPY performed by Isamar Kelly MD at [facility] OR    TUBAL LIGATION      URETER STENT PLACEMENT      X 2      Social History     Socioeconomic History    Marital status:    Tobacco Use    Smoking status: Never    Smokeless tobacco: Never   Vaping Use    Vaping status: Never Used   Substance and Sexual Activity    Alcohol use: Yes     Comment: RARELY    Drug use: No    Sexual activity: Yes     Partners: Male     Social Determinants of Health     Financial Resource Strain: Low Risk  (5/29/2024)    Overall Financial Resource Strain (CARDIA)     Difficulty of Paying Living Expenses:

## 2025-03-04 ENCOUNTER — HOSPITAL ENCOUNTER (EMERGENCY)
Age: 45
Discharge: HOME | End: 2025-03-04
Payer: OTHER GOVERNMENT

## 2025-03-04 ENCOUNTER — APPOINTMENT (OUTPATIENT)
Dept: VASCULAR LAB | Age: 45
End: 2025-03-04
Payer: OTHER GOVERNMENT

## 2025-03-04 ENCOUNTER — TELEPHONE (OUTPATIENT)
Age: 45
End: 2025-03-04

## 2025-03-04 VITALS
BODY MASS INDEX: 31.25 KG/M2 | HEART RATE: 83 BPM | DIASTOLIC BLOOD PRESSURE: 88 MMHG | WEIGHT: 160 LBS | OXYGEN SATURATION: 96 % | RESPIRATION RATE: 16 BRPM | TEMPERATURE: 98.4 F | SYSTOLIC BLOOD PRESSURE: 116 MMHG

## 2025-03-04 DIAGNOSIS — Z98.890 HISTORY OF ANKLE SURGERY: ICD-10-CM

## 2025-03-04 DIAGNOSIS — M79.604 RIGHT LEG PAIN: Primary | ICD-10-CM

## 2025-03-04 DIAGNOSIS — M79.661 RIGHT CALF PAIN: Primary | ICD-10-CM

## 2025-03-04 LAB
ALBUMIN SERPL-MCNC: 3.8 G/DL (ref 3.5–5.2)
ALP SERPL-CCNC: 86 U/L (ref 35–104)
ALT SERPL-CCNC: 23 U/L (ref 10–35)
ANION GAP SERPL CALCULATED.3IONS-SCNC: 13 MMOL/L (ref 8–16)
AST SERPL-CCNC: 33 U/L (ref 10–35)
BASOPHILS # BLD: 0 K/UL (ref 0–0.2)
BASOPHILS NFR BLD: 0.6 % (ref 0–1)
BILIRUB SERPL-MCNC: <0.2 MG/DL (ref 0.2–1.2)
BUN SERPL-MCNC: 17 MG/DL (ref 6–20)
CALCIUM SERPL-MCNC: 8.8 MG/DL (ref 8.6–10)
CHLORIDE SERPL-SCNC: 100 MMOL/L (ref 98–107)
CO2 SERPL-SCNC: 21 MMOL/L (ref 22–29)
CREAT SERPL-MCNC: 0.7 MG/DL (ref 0.5–0.9)
D DIMER PPP FEU-MCNC: 0.76 UG/ML FEU (ref 0–0.48)
EOSINOPHIL # BLD: 0.2 K/UL (ref 0–0.6)
EOSINOPHIL NFR BLD: 2.7 % (ref 0–5)
ERYTHROCYTE [DISTWIDTH] IN BLOOD BY AUTOMATED COUNT: 12.6 % (ref 11.5–14.5)
GLUCOSE SERPL-MCNC: 85 MG/DL (ref 70–99)
HCT VFR BLD AUTO: 41.9 % (ref 37–47)
HGB BLD-MCNC: 13.9 G/DL (ref 12–16)
IMM GRANULOCYTES # BLD: 0 K/UL
LYMPHOCYTES # BLD: 2.7 K/UL (ref 1.1–4.5)
LYMPHOCYTES NFR BLD: 40.3 % (ref 20–40)
MCH RBC QN AUTO: 30.3 PG (ref 27–31)
MCHC RBC AUTO-ENTMCNC: 33.2 G/DL (ref 33–37)
MCV RBC AUTO: 91.5 FL (ref 81–99)
MONOCYTES # BLD: 0.4 K/UL (ref 0–0.9)
MONOCYTES NFR BLD: 6.5 % (ref 0–10)
NEUTROPHILS # BLD: 3.3 K/UL (ref 1.5–7.5)
NEUTS SEG NFR BLD: 49.6 % (ref 50–65)
PLATELET # BLD AUTO: 327 K/UL (ref 130–400)
PMV BLD AUTO: 8.6 FL (ref 9.4–12.3)
POTASSIUM SERPL-SCNC: 4.7 MMOL/L (ref 3.5–5.1)
PROT SERPL-MCNC: 7.3 G/DL (ref 6.4–8.3)
RBC # BLD AUTO: 4.58 M/UL (ref 4.2–5.4)
SODIUM SERPL-SCNC: 134 MMOL/L (ref 136–145)
WBC # BLD AUTO: 6.7 K/UL (ref 4.8–10.8)

## 2025-03-04 PROCEDURE — 80053 COMPREHEN METABOLIC PANEL: CPT

## 2025-03-04 PROCEDURE — 85025 COMPLETE CBC W/AUTO DIFF WBC: CPT

## 2025-03-04 PROCEDURE — 93971 EXTREMITY STUDY: CPT

## 2025-03-04 PROCEDURE — 85379 FIBRIN DEGRADATION QUANT: CPT

## 2025-03-04 PROCEDURE — 99284 EMERGENCY DEPT VISIT MOD MDM: CPT

## 2025-03-04 PROCEDURE — 36415 COLL VENOUS BLD VENIPUNCTURE: CPT

## 2025-03-04 RX ORDER — OXYCODONE AND ACETAMINOPHEN 5; 325 MG/1; MG/1
1 TABLET ORAL EVERY 6 HOURS PRN
Qty: 12 TABLET | Refills: 0 | Status: SHIPPED | OUTPATIENT
Start: 2025-03-04 | End: 2025-03-07

## 2025-03-04 RX ORDER — CLOBETASOL PROPIONATE 0.05 G/100ML
SHAMPOO TOPICAL
Qty: 118 ML | Refills: 0 | Status: SHIPPED | OUTPATIENT
Start: 2025-03-04

## 2025-03-04 ASSESSMENT — ENCOUNTER SYMPTOMS: RESPIRATORY NEGATIVE: 1

## 2025-03-04 NOTE — TELEPHONE ENCOUNTER
Returned  call to patient. Patient reported she has been taking her Lovenox QD but her calf is burning and painful and started 3/3/25. I spoke with Meera and she encouraged patient to go to emergency room due to the time as outpatient vascular clinics have closed. Informed patient and she verbalized understanding

## 2025-03-04 NOTE — PROGRESS NOTES
Vascular lab preliminary.  Right leg venous duplex scan completed.  No evidence for DVT, SVT, or reflux noted at this time.  Final report pending.

## 2025-03-04 NOTE — ED PROVIDER NOTES
[facility] EMERGENCY DEPARTMENT  EMERGENCY DEPARTMENT ENCOUNTER      Pt Name: Ruth Sherman  MRN: [id number]  Birthdate 1980  Date of evaluation: 3/4/2025  Provider: PINKY Gonsalez NP    CHIEF COMPLAINT       Chief Complaint   Patient presents with    Leg Pain     Recent foot sx, right calf pain that started 2 days ago; sent by Fernandez for clot r/o         HISTORY OF PRESENT ILLNESS   (Location/Symptom, Timing/Onset,Context/Setting, Quality, Duration, Modifying Factors, Severity)  Note limiting factors.       Ruth Sherman is a 44 y.o. female who presents to the emergency department with complaint of right calf pain that started 2 days ago.  She was evaluated by her podiatrist today for checkup as she had a recent foot surgery 2/20/25.  She denies any shortness of breath.  Pertinent medical history includes anxiety, Bell's palsy, migraines, depression, headache, kidney stone, narcolepsy, JAMES, osteoarthritis.  She denies any fever or chills.    The history is provided by the patient.       NursingNotes were reviewed.    REVIEW OF SYSTEMS    (2-9 systems for level 4, 10 or more for level 5)     Review of Systems   Constitutional:  Positive for activity change.        As per HPI; activity change due to recent surgery.  Patient wearing orthopedic walking boot.   Respiratory: Negative.     Cardiovascular: Negative.    Musculoskeletal:  Positive for arthralgias and myalgias. Negative for joint swelling.   All other systems reviewed and are negative.      A complete review of systems was performed and is negative except as noted above in the HPI.       PAST MEDICAL HISTORY     Past Medical History:   Diagnosis Date    Anxiety     Bell's palsy     Chronic migraine without aura, intractable, without status migrainosus 08/10/2017    CPAP (continuous positive airway pressure) dependence     Depression     Headache     Insomnia     Intractable migraine without aura and without status migrainosus 08/10/2017

## 2025-03-05 NOTE — DISCHARGE INSTRUCTIONS
Continue to follow instructions that Dr. Fernandez established.  Pain medication as prescribed  Follow-up with Dr. Fernandez as previously scheduled.  Be sure you are drinking plenty of water when taking the pain medication as it may constipate you.  Return to ER for any new, worsening, or change in condition.

## 2025-03-06 PROCEDURE — 93971 EXTREMITY STUDY: CPT | Performed by: SURGERY

## 2025-03-07 ENCOUNTER — TELEPHONE (OUTPATIENT)
Age: 45
End: 2025-03-07

## 2025-03-07 NOTE — TELEPHONE ENCOUNTER
Spoke to this patient today and she stated that she feels much better and she is fine to keep her appointment on Thursday.

## 2025-03-10 ENCOUNTER — OFFICE VISIT (OUTPATIENT)
Dept: NEUROLOGY | Age: 45
End: 2025-03-10
Payer: OTHER GOVERNMENT

## 2025-03-10 VITALS
HEART RATE: 104 BPM | OXYGEN SATURATION: 98 % | BODY MASS INDEX: 31.41 KG/M2 | HEIGHT: 60 IN | DIASTOLIC BLOOD PRESSURE: 86 MMHG | WEIGHT: 160 LBS | SYSTOLIC BLOOD PRESSURE: 137 MMHG

## 2025-03-10 DIAGNOSIS — Z99.89 CPAP (CONTINUOUS POSITIVE AIRWAY PRESSURE) DEPENDENCE: ICD-10-CM

## 2025-03-10 DIAGNOSIS — G47.419 PRIMARY NARCOLEPSY WITHOUT CATAPLEXY: Primary | ICD-10-CM

## 2025-03-10 DIAGNOSIS — Z79.899 LONG TERM USE OF DRUG: ICD-10-CM

## 2025-03-10 DIAGNOSIS — Z79.899 MEDICATION MANAGEMENT: ICD-10-CM

## 2025-03-10 DIAGNOSIS — G47.33 OBSTRUCTIVE SLEEP APNEA: ICD-10-CM

## 2025-03-10 DIAGNOSIS — G47.61 PERIODIC LIMB MOVEMENT DISORDER: ICD-10-CM

## 2025-03-10 DIAGNOSIS — G25.81 RESTLESS LEG SYNDROME: ICD-10-CM

## 2025-03-10 DIAGNOSIS — R40.0 SOMNOLENCE, DAYTIME: ICD-10-CM

## 2025-03-10 LAB
AMPHET UR QL SCN: NEGATIVE
BARBITURATES UR QL SCN: NEGATIVE
BENZODIAZ UR QL SCN: NEGATIVE
BUPRENORPHINE URINE: NEGATIVE
CANNABINOIDS UR QL SCN: NEGATIVE
COCAINE UR QL SCN: NEGATIVE
DRUG SCREEN COMMENT UR-IMP: NORMAL
FENTANYL SCREEN, URINE: NEGATIVE
METHADONE UR QL SCN: NEGATIVE
METHAMPHETAMINE, URINE: NEGATIVE
OPIATES UR QL SCN: NEGATIVE
OXYCODONE UR QL SCN: NEGATIVE
PCP UR QL SCN: NEGATIVE
TRICYCLIC ANTIDEPRESSANTS, UR: NEGATIVE

## 2025-03-10 PROCEDURE — 99213 OFFICE O/P EST LOW 20 MIN: CPT | Performed by: PHYSICIAN ASSISTANT

## 2025-03-10 NOTE — PROGRESS NOTES
[facility]  [facility], [address]  [place] [address]  Phone [phone]  Fax [phone]       [facility] Sleep Follow Up Encounter      Information:   Patient Name: Ruth Sherman  :   1980  Age:   44 y.o.  MRN:   [id number]  Account #:  [id number]  Today:                3/10/25    Provider:  Heather Stacy PA-C    Chief Complaint   Patient presents with    Follow-up    Sleep Apnea     Patient states things are better since pressure change.     narcolepsy without cataplexy      Patient is still taking modafinil, it is helping.     Restless leg syndrome        Subjective:   Ruth Sherman is a 44 y.o. female who has  has a past medical history of Anxiety, Bell's palsy, Chronic migraine without aura, intractable, without status migrainosus, CPAP (continuous positive airway pressure) dependence, Depression, Headache, Insomnia, Intractable migraine without aura and without status migrainosus, Irritable bowel syndrome, Kidney stone, Narcolepsy without cataplexy, JAMES (obstructive sleep apnea), Osteoarthritis, and Periodic limb movement disorder (PLMD).    Ruth Sherman comes in for a sleep clinic follow up. We manage JAMES on CPAP and narcolepsy treated with modafinil. She also has a hx of PLMD/RLS stable with ropinirole prescribed by Dr. Jenny Lynn.     She is prescribed APAP with a pressure range of 6cm to 12cm. The pressures were adjusted at the last office visit. She does indicate that she has been falling asleep without CPAP unintentionally. She had surgery and has been sleeping in the recliner. She uses a full face mask. It is the wrong size and it has been uncomfortable. The compliance report does not indicate compliance at this office visit.  She reports that she sleeps well and her sleep is restorative when she uses PAP.  She has some daytime somnolence but much improved. The ESS score is 10.  She desires to continue present therapy.    She has been taking modafinil

## 2025-03-11 DIAGNOSIS — G47.00 INSOMNIA, UNSPECIFIED TYPE: Primary | ICD-10-CM

## 2025-03-11 RX ORDER — ESZOPICLONE 3 MG/1
TABLET, FILM COATED ORAL
Qty: 30 TABLET | Refills: 0 | Status: SHIPPED | OUTPATIENT
Start: 2025-03-11 | End: 2025-04-10

## 2025-03-11 NOTE — TELEPHONE ENCOUNTER
LAST OV: 12/3/2024    LAST SOSA: 2/27/2025    LAST UDS: 12/3/2024    NEXT SCHEDULED OV: 6/3/2025    MED CONTRACT: 12/3/2025

## 2025-03-11 NOTE — TELEPHONE ENCOUNTER
PDMP Monitoring:    Last PDMP [place] as Reviewed ([place]):  Review User Review Instant Review Result   EBONI VO 2/27/2024  9:17 AM Reviewed PDMP [1]     Urine Drug Screenings (1 yr)       POCT Rapid Drug Screen  Collected: 12/3/2024  8:05 AM (Final result)              POCT Rapid Drug Screen  Collected: 3/13/2023 (Final result)                  Medication Contract and Consent for Opioid Use Documents Filed       Patient Documents       Type of Document Status Date Received Received By Description    Medication Contract Received 5/1/2024  1:03 PM GENNY SINGH Medication Contract 4/2024    Medication Contract Received 12/3/2024  8:06 AM DARBY MORENO Medication Contract    Medication Contract Received 3/10/2025 10:24 AM GENNY SINGH Medication Contract  - Neurology

## 2025-03-13 ENCOUNTER — OFFICE VISIT (OUTPATIENT)
Age: 45
End: 2025-03-13

## 2025-03-13 VITALS — WEIGHT: 160 LBS | BODY MASS INDEX: 31.41 KG/M2 | HEIGHT: 60 IN

## 2025-03-13 DIAGNOSIS — Z47.89 AFTERCARE FOLLOWING SURGERY OF THE MUSCULOSKELETAL SYSTEM: Primary | ICD-10-CM

## 2025-03-13 PROCEDURE — 99024 POSTOP FOLLOW-UP VISIT: CPT | Performed by: NURSE PRACTITIONER

## 2025-03-13 RX ORDER — ENOXAPARIN SODIUM 100 MG/ML
40 INJECTION SUBCUTANEOUS DAILY
Qty: 30 EACH | Refills: 0 | Status: SHIPPED | OUTPATIENT
Start: 2025-03-13

## 2025-03-13 ASSESSMENT — ENCOUNTER SYMPTOMS
BLOOD IN STOOL: 0
COUGH: 0
CONSTIPATION: 0
DIARRHEA: 0
NAUSEA: 0
COLOR CHANGE: 0
SHORTNESS OF BREATH: 0
VOMITING: 0

## 2025-03-13 NOTE — PROGRESS NOTES
[facility]  [facility] ORTHOPED[facility]  [address]  [facility] [address]  [phone]     Patient: Ruth Sherman   YOB: 1980   Date: 3/13/2025   Visit Type:  Post op    Body Part: Ankle right    What was the date of surgery? 2/20/25    Patient states her ankle is  and sore.  Burning and tingling in calf and heel.  Toes can move.  Still swelling.  More painful at night.    Review of Systems    Review of Systems   Constitutional:  Negative for appetite change, chills, fatigue and fever.   Respiratory:  Negative for cough and shortness of breath.    Cardiovascular:  Negative for chest pain, palpitations and leg swelling.   Gastrointestinal:  Negative for blood in stool, constipation, diarrhea, nausea and vomiting.   Musculoskeletal:  Negative for arthralgias, gait problem and joint swelling.   Skin:  Negative for color change and wound.   Neurological:  Negative for weakness.

## 2025-03-13 NOTE — PROGRESS NOTES
[facility]  [facility]  [address]  [facility] [id number] [phone]     Patient: Ruth Sherman   YOB: 1980   Date: 3/13/2025   Visit Type:      History of Present Illness  Chief Complaint   Patient presents with    Post-Op Check     Right ankle       This is a 44 y.o. female presents today 3 weeks status post right ankle surgery including ankle arthroscopy with removal loose body, microfracture of the talus with cartilage grafting.  Date of procedure 2/20/2025.  She presents today for follow-up and suture removal.  She is doing well.  Pain controlled.  Continues Lovenox.  She is nonweightbearing.  Past Medical History:   Diagnosis Date    Anxiety     Bell's palsy     Chronic migraine without aura, intractable, without status migrainosus 08/10/2017    CPAP (continuous positive airway pressure) dependence     Depression     Headache     Insomnia     Intractable migraine without aura and without status migrainosus 08/10/2017    Irritable bowel syndrome     Kidney stone 10/2017    Narcolepsy without cataplexy     JAMES (obstructive sleep apnea)     Osteoarthritis     Periodic limb movement disorder (PLMD)       Past Surgical History:   Procedure Laterality Date    ANKLE SURGERY Right     CHOLECYSTECTOMY  05/10/2023    CYSTOURETHROSCOPY/STENT REMOVAL      HYSTERECTOMY (CERVIX STATUS UNKNOWN) N/A 11/21/2019    TOTAL LAPAROSCOPIC HYSTERECTOMY WITH DAVINCI CYSTOSCOPY performed by Isamar Kelly MD at [facility] OR    TUBAL LIGATION      URETER STENT PLACEMENT      X 2      Social History     Socioeconomic History    Marital status:      Spouse name: None    Number of children: None    Years of education: None    Highest education level: None   Tobacco Use    Smoking status: Never    Smokeless tobacco: Never   Vaping Use    Vaping status: Never Used   Substance and Sexual Activity    Alcohol use: Yes     Comment: RARELY    Drug

## 2025-03-20 DIAGNOSIS — G47.419 PRIMARY NARCOLEPSY WITHOUT CATAPLEXY: ICD-10-CM

## 2025-03-20 NOTE — TELEPHONE ENCOUNTER
Requested Prescriptions     Pending Prescriptions Disp Refills    modafinil (PROVIGIL) 200 MG tablet 60 tablet 2     Si q am and 1 q noon       Last Office Visit:  3/10/2025  Next Office Visit:  9/15/2025  Last Medication Refill:  10/4/24 per pharmacy  Sorin up to date:  3/10/25    *RX updated to reflect   3/20/25  fill date*    negative - no change in level of consciousness

## 2025-03-21 RX ORDER — MODAFINIL 200 MG/1
TABLET ORAL
Qty: 60 TABLET | Refills: 2 | Status: SHIPPED | OUTPATIENT
Start: 2025-03-21 | End: 2025-05-20

## 2025-04-10 ENCOUNTER — OFFICE VISIT (OUTPATIENT)
Age: 45
End: 2025-04-10

## 2025-04-10 VITALS — HEIGHT: 60 IN | BODY MASS INDEX: 31.41 KG/M2 | WEIGHT: 160 LBS

## 2025-04-10 DIAGNOSIS — Z47.89 AFTERCARE FOLLOWING SURGERY OF THE MUSCULOSKELETAL SYSTEM: ICD-10-CM

## 2025-04-10 DIAGNOSIS — M95.8 OSTEOCHONDRAL DEFECT OF TALUS: Primary | ICD-10-CM

## 2025-04-10 PROCEDURE — 99024 POSTOP FOLLOW-UP VISIT: CPT | Performed by: NURSE PRACTITIONER

## 2025-04-10 ASSESSMENT — ENCOUNTER SYMPTOMS
SHORTNESS OF BREATH: 0
COLOR CHANGE: 0
VOMITING: 0
DIARRHEA: 0
BLOOD IN STOOL: 0
NAUSEA: 0
CONSTIPATION: 0
COUGH: 0

## 2025-04-10 NOTE — PROGRESS NOTES
[facility]  [facility]  [address]  [facility] [id number] [phone]     Patient: Ruth Sherman   YOB: 1980   Date: 4/10/2025   Visit Type:  Post op    Body Part: Ankle right    What was the date of surgery? 2/20/25    Patient states her ankle is better than it was.   with shooting pains sometimes.  Her calf is better but also tender.  Reports her foot getting hot when the ankle starts to hurt, and it's very uncomfortable.  Ankle pops also, which causes pain.  Pain scale is 5.    Review of Systems    Review of Systems   Constitutional:  Negative for appetite change, chills, fatigue and fever.   Respiratory:  Negative for cough and shortness of breath.    Cardiovascular:  Negative for chest pain, palpitations and leg swelling.   Gastrointestinal:  Negative for blood in stool, constipation, diarrhea, nausea and vomiting.   Musculoskeletal:  Negative for arthralgias, gait problem and joint swelling.   Skin:  Negative for color change and wound.   Neurological:  Negative for weakness.        
noted in HPI and Chief Complaint    Ht 1.524 m (5')   Wt 72.6 kg (160 lb)   LMP 10/19/2019   BMI 31.25 kg/m²      Physical Exam      Physical Exam   Physical Examination:  General: The patient is a Well Nourished 44 y.o. female who is alert and oriented x3 in no distress. The patient is cooperative during the exam.  Psychological: Is a pleasant female, good mood and affect, answers questions appropriately.  Incision sites well-healed.  Mild edema.  Negative Homans' sign.    Physical Exam     Results      Assessment & Plan      Plan    ICD-10-CM    1. Osteochondral defect of talus  M95.8       2. Aftercare following surgery of the musculoskeletal system  Z47.89            Plan Narrative  Patient's condition was discussed.  She will continue be nonweightbearing for additional week after 1 week can begin partial weightbearing progressing to weightbearing in her boot.  Reevaluate in 4 weeks.  No new x-rays needed.    Return in about 4 weeks (around 5/8/2025) for No x-ray, right ankle recheck.      Patient given educational materials - see patient instructions.   Discussed use, benefit, and side effects of prescribed medications.  All patient questions answered.  Pt voiced understanding. Patient agreed with treatment plan. Follow up as needed.    This dictation was generated by voice recognition computer software. Although all attempts are made to edit the dictation for accuracy, there may be errors in the transcription that are not intended.    Electronically signed by PINKY Jones on 4/10/2025 at 10:03 AM

## 2025-04-28 RX ORDER — CLOBETASOL PROPIONATE 0.05 G/100ML
SHAMPOO TOPICAL
Qty: 118 ML | Refills: 0 | Status: SHIPPED | OUTPATIENT
Start: 2025-04-28

## 2025-05-08 ENCOUNTER — OFFICE VISIT (OUTPATIENT)
Age: 45
End: 2025-05-08

## 2025-05-08 VITALS — WEIGHT: 160 LBS | BODY MASS INDEX: 31.41 KG/M2 | HEIGHT: 60 IN

## 2025-05-08 DIAGNOSIS — Z47.89 AFTERCARE FOLLOWING SURGERY OF THE MUSCULOSKELETAL SYSTEM: ICD-10-CM

## 2025-05-08 DIAGNOSIS — M95.8 OSTEOCHONDRAL DEFECT OF TALUS: Primary | ICD-10-CM

## 2025-05-08 PROCEDURE — 99024 POSTOP FOLLOW-UP VISIT: CPT | Performed by: NURSE PRACTITIONER

## 2025-05-08 RX ORDER — GABAPENTIN 100 MG/1
100 CAPSULE ORAL 2 TIMES DAILY
Qty: 60 CAPSULE | Refills: 1 | Status: SHIPPED | OUTPATIENT
Start: 2025-05-08 | End: 2025-06-07

## 2025-05-08 NOTE — PROGRESS NOTES
[facility]  [facility]  [address]  [facility] [id number] [phone]     Patient: Ruth Sherman   YOB: 1980   Date: 5/8/2025   Visit Type:      History of Present Illness  Chief Complaint   Patient presents with    Follow-up     Right ankle     History of Present Illness  The patient is a 44-year-old female who presents today for evaluation status post right lower extremity surgery, including ankle arthroscopy with removal of loose body, microfracture of the talus, and cartilage grafting. The procedure was performed on 02/20/2025. She is weightbearing in her boot and is currently 11 weeks post-surgery.    She reports persistent swelling and significant pain in the affected area, describing a burning sensation throughout her foot that is not evident upon touch. The pain is generalized and severe, causing discomfort regardless of whether she elevates the foot or applies an ice pack. She also experiences numbness and tingling in her toes.  Despite attempts at flexing exercises, she finds them painful. The burning sensation is particularly bothersome at night, often disrupting her sleep. She has resorted to sleeping on her couch with her foot elevated and uncovered to manage the heat type sensation. She relies heavily on crutches for mobility but acknowledges some progress in her condition. She is currently working from home. She has previously used a TENS unit during physical therapy and is interested in obtaining one for home use. She has been managing the pain with Tylenol and ice packs.        Past Medical History:   Diagnosis Date    Anxiety     Bell's palsy     Chronic migraine without aura, intractable, without status migrainosus 08/10/2017    CPAP (continuous positive airway pressure) dependence     Depression     Headache     Insomnia     Intractable migraine without aura and without status migrainosus 08/10/2017

## 2025-05-08 NOTE — PROGRESS NOTES
[facility]  [facility]  [address]  [facility] [id number] [phone]     Patient: Ruth Sherman   YOB: 1980   Date: 5/8/2025   Visit Type:  Follow up    Body Part: Right ankle    What was the date of surgery? 2/20/25    Patient states her ankle still swells pretty bad and has a lot of pain.  Pain scale of 4.  Presents in a boot today.  Been having numbness and tingling in her toes.  Still has a sensation that her foot is really hot; not hot to the touch, just a burning sensation.    Review of Systems    Review of Systems   Constitutional:  Negative for appetite change, chills, fatigue and fever.   Respiratory:  Negative for cough and shortness of breath.    Cardiovascular:  Negative for chest pain, palpitations and leg swelling.   Gastrointestinal:  Negative for blood in stool, constipation, diarrhea, nausea and vomiting.   Musculoskeletal:  Negative for arthralgias, gait problem and joint swelling.   Skin:  Negative for color change and wound.   Neurological:  Negative for weakness.

## 2025-05-27 DIAGNOSIS — G47.00 INSOMNIA, UNSPECIFIED TYPE: ICD-10-CM

## 2025-05-27 RX ORDER — ESZOPICLONE 3 MG/1
TABLET, FILM COATED ORAL
Qty: 30 TABLET | Refills: 0 | Status: SHIPPED | OUTPATIENT
Start: 2025-05-27 | End: 2025-06-26

## 2025-05-27 NOTE — TELEPHONE ENCOUNTER
Provider needs to review PDMP    PDMP Monitoring:    Last PDMP [place] as Reviewed ([place]):  Review User Review Instant Review Result   EBONI VO 2/27/2024  9:17 AM Reviewed PDMP [1]     Urine Drug Screenings (1 yr)       POCT Rapid Drug Screen  Collected: 12/3/2024  8:05 AM (Final result)              POCT Rapid Drug Screen  Collected: 3/13/2023 (Final result)                  Medication Contract and Consent for Opioid Use Documents Filed       Patient Documents       Type of Document Status Date Received Received By Description    Medication Contract Received 5/1/2024  1:03 PM GENNY SINGH Medication Contract 4/2024    Medication Contract Received 12/3/2024  8:06 AM DARBY MORENO Medication Contract    Medication Contract Received 3/10/2025 10:24 AM GENNY SINGH Medication Contract  - Neurology

## 2025-06-03 ENCOUNTER — RESULTS FOLLOW-UP (OUTPATIENT)
Dept: PRIMARY CARE CLINIC | Age: 45
End: 2025-06-03

## 2025-06-03 ENCOUNTER — OFFICE VISIT (OUTPATIENT)
Dept: PRIMARY CARE CLINIC | Age: 45
End: 2025-06-03
Payer: OTHER GOVERNMENT

## 2025-06-03 ENCOUNTER — ANCILLARY PROCEDURE (OUTPATIENT)
Dept: PRIMARY CARE CLINIC | Age: 45
End: 2025-06-03
Payer: OTHER GOVERNMENT

## 2025-06-03 VITALS
TEMPERATURE: 97 F | WEIGHT: 165 LBS | HEIGHT: 60 IN | RESPIRATION RATE: 18 BRPM | OXYGEN SATURATION: 98 % | DIASTOLIC BLOOD PRESSURE: 78 MMHG | BODY MASS INDEX: 32.39 KG/M2 | HEART RATE: 97 BPM | SYSTOLIC BLOOD PRESSURE: 124 MMHG

## 2025-06-03 DIAGNOSIS — Z00.00 WELL ADULT EXAM: ICD-10-CM

## 2025-06-03 DIAGNOSIS — H61.23 BILATERAL IMPACTED CERUMEN: ICD-10-CM

## 2025-06-03 DIAGNOSIS — F41.9 ANXIETY: ICD-10-CM

## 2025-06-03 DIAGNOSIS — R43.9 SMELL DISTURBANCE: ICD-10-CM

## 2025-06-03 DIAGNOSIS — R20.0 RIGHT ARM NUMBNESS: ICD-10-CM

## 2025-06-03 DIAGNOSIS — G89.29 CHRONIC RIGHT SHOULDER PAIN: Primary | ICD-10-CM

## 2025-06-03 DIAGNOSIS — G47.00 INSOMNIA, UNSPECIFIED TYPE: ICD-10-CM

## 2025-06-03 DIAGNOSIS — M25.511 CHRONIC RIGHT SHOULDER PAIN: Primary | ICD-10-CM

## 2025-06-03 DIAGNOSIS — G47.33 OBSTRUCTIVE SLEEP APNEA: ICD-10-CM

## 2025-06-03 LAB
ALBUMIN SERPL-MCNC: 4.1 G/DL (ref 3.5–5.2)
ALP SERPL-CCNC: 114 U/L (ref 35–104)
ALT SERPL-CCNC: 27 U/L (ref 10–35)
ANION GAP SERPL CALCULATED.3IONS-SCNC: 10 MMOL/L (ref 8–16)
AST SERPL-CCNC: 23 U/L (ref 10–35)
BASOPHILS # BLD: 0 K/UL (ref 0–0.2)
BASOPHILS NFR BLD: 0.5 % (ref 0–1)
BILIRUB SERPL-MCNC: 0.2 MG/DL (ref 0.2–1.2)
BUN SERPL-MCNC: 20 MG/DL (ref 6–20)
CALCIUM SERPL-MCNC: 9.7 MG/DL (ref 8.6–10)
CHLORIDE SERPL-SCNC: 104 MMOL/L (ref 98–107)
CHOLEST SERPL-MCNC: 213 MG/DL (ref 0–199)
CO2 SERPL-SCNC: 25 MMOL/L (ref 22–29)
CREAT SERPL-MCNC: 0.6 MG/DL (ref 0.5–0.9)
EOSINOPHIL # BLD: 0.2 K/UL (ref 0–0.6)
EOSINOPHIL NFR BLD: 2.4 % (ref 0–5)
ERYTHROCYTE [DISTWIDTH] IN BLOOD BY AUTOMATED COUNT: 12.9 % (ref 11.5–14.5)
GLUCOSE SERPL-MCNC: 101 MG/DL (ref 70–99)
HBA1C MFR BLD: 6.1 % (ref 4–5.6)
HCT VFR BLD AUTO: 41.9 % (ref 37–47)
HDLC SERPL-MCNC: 77 MG/DL (ref 40–60)
HGB BLD-MCNC: 13.5 G/DL (ref 12–16)
IMM GRANULOCYTES # BLD: 0 K/UL
LDLC SERPL CALC-MCNC: 96 MG/DL
LYMPHOCYTES # BLD: 2.2 K/UL (ref 1.1–4.5)
LYMPHOCYTES NFR BLD: 33.5 % (ref 20–40)
MCH RBC QN AUTO: 30.1 PG (ref 27–31)
MCHC RBC AUTO-ENTMCNC: 32.2 G/DL (ref 33–37)
MCV RBC AUTO: 93.3 FL (ref 81–99)
MONOCYTES # BLD: 0.4 K/UL (ref 0–0.9)
MONOCYTES NFR BLD: 5.6 % (ref 0–10)
NEUTROPHILS # BLD: 3.8 K/UL (ref 1.5–7.5)
NEUTS SEG NFR BLD: 57.7 % (ref 50–65)
PLATELET # BLD AUTO: 294 K/UL (ref 130–400)
PMV BLD AUTO: 9.8 FL (ref 9.4–12.3)
POTASSIUM SERPL-SCNC: 4.4 MMOL/L (ref 3.5–5.1)
PROT SERPL-MCNC: 7.5 G/DL (ref 6.4–8.3)
RBC # BLD AUTO: 4.49 M/UL (ref 4.2–5.4)
SODIUM SERPL-SCNC: 139 MMOL/L (ref 136–145)
TRIGL SERPL-MCNC: 199 MG/DL (ref 0–149)
TSH SERPL DL<=0.005 MIU/L-ACNC: 2.39 UIU/ML (ref 0.27–4.2)
WBC # BLD AUTO: 6.6 K/UL (ref 4.8–10.8)

## 2025-06-03 PROCEDURE — 99214 OFFICE O/P EST MOD 30 MIN: CPT | Performed by: FAMILY MEDICINE

## 2025-06-03 PROCEDURE — 73030 X-RAY EXAM OF SHOULDER: CPT | Performed by: FAMILY MEDICINE

## 2025-06-03 RX ORDER — LORAZEPAM 1 MG/1
TABLET ORAL
COMMUNITY
Start: 2023-04-03

## 2025-06-03 ASSESSMENT — ENCOUNTER SYMPTOMS
COUGH: 0
DIARRHEA: 0
VOMITING: 0
NAUSEA: 0
ABDOMINAL PAIN: 0
COLOR CHANGE: 0
BACK PAIN: 0
WHEEZING: 0
EYE DISCHARGE: 0

## 2025-06-03 NOTE — PROGRESS NOTES
SUBJECTIVE:    Patient ID: Ruth Sherman is a 45 y.o. female.    History of Present Illness  The patient presents for a checkup.    She reports an intermittent, unpleasant odor that has been persisting for several weeks.  She said it smells like mildew at random times. She is not currently using Flonase or any nasal saline treatments. She had a COVID-19 infection in 02/2025 but does not recall any associated olfactory disturbances.    She underwent ankle surgery and is currently in her second week of physical therapy. The recovery process has been slower than expected, with complications necessitating an emergency room visit due to suspected thrombosis.    She was previously referred to physical therapy for shoulder issues, characterized by numbness in her arm. The condition has since deteriorated, with increased pain and prolonged periods of numbness, particularly in her hand. She has been informed of a potential pinched nerve and attempts to alleviate symptoms through stretching. Similar symptoms are present in her other arm, although less severe. An MRI of the shoulder has not been performed. A nerve conduction study revealed mild carpal tunnel syndrome, and she reports difficulty in maintaining  strength, leading to frequent dropping of objects.    She also reports itching in her ear, accompanied by a slight odor, which she attributes to a flare-up of her eczema or lupus.    She has noticed some weight gain.    She does have obstructive sleep apnea and uses her CPAP machine faithfully    PAST SURGICAL HISTORY:  Ankle surgery: 05/2025  Nerve conduction study: MM/YYYY      Past Medical History:   Diagnosis Date    Anxiety     Bell's palsy     Chronic migraine without aura, intractable, without status migrainosus 08/10/2017    CPAP (continuous positive airway pressure) dependence     Depression     GERD (gastroesophageal reflux disease)     Headache     Insomnia     Intractable migraine without aura and

## 2025-06-10 ENCOUNTER — HOSPITAL ENCOUNTER (OUTPATIENT)
Dept: MRI IMAGING | Age: 45
Discharge: HOME | End: 2025-06-10
Payer: OTHER GOVERNMENT

## 2025-06-10 DIAGNOSIS — M25.511 CHRONIC RIGHT SHOULDER PAIN: ICD-10-CM

## 2025-06-10 DIAGNOSIS — G89.29 CHRONIC RIGHT SHOULDER PAIN: ICD-10-CM

## 2025-06-10 DIAGNOSIS — R20.0 RIGHT ARM NUMBNESS: ICD-10-CM

## 2025-06-10 PROCEDURE — 73221 MRI JOINT UPR EXTREM W/O DYE: CPT

## 2025-06-16 RX ORDER — LAMOTRIGINE 25 MG/1
25 TABLET ORAL 2 TIMES DAILY
Qty: 180 TABLET | Refills: 1 | Status: SHIPPED | OUTPATIENT
Start: 2025-06-16

## 2025-06-16 RX ORDER — CITALOPRAM HYDROBROMIDE 10 MG/1
10 TABLET ORAL DAILY
Qty: 90 TABLET | Refills: 1 | Status: SHIPPED | OUTPATIENT
Start: 2025-06-16

## 2025-06-19 DIAGNOSIS — M25.511 CHRONIC RIGHT SHOULDER PAIN: Primary | ICD-10-CM

## 2025-06-19 DIAGNOSIS — G89.29 CHRONIC RIGHT SHOULDER PAIN: Primary | ICD-10-CM

## 2025-06-23 ENCOUNTER — OFFICE VISIT (OUTPATIENT)
Age: 45
End: 2025-06-23
Payer: OTHER GOVERNMENT

## 2025-06-23 VITALS — WEIGHT: 165 LBS | BODY MASS INDEX: 32.39 KG/M2 | HEIGHT: 60 IN

## 2025-06-23 DIAGNOSIS — Z47.89 AFTERCARE FOLLOWING SURGERY OF THE MUSCULOSKELETAL SYSTEM: ICD-10-CM

## 2025-06-23 DIAGNOSIS — M95.8 OSTEOCHONDRAL DEFECT OF TALUS: Primary | ICD-10-CM

## 2025-06-23 PROCEDURE — 99213 OFFICE O/P EST LOW 20 MIN: CPT | Performed by: NURSE PRACTITIONER

## 2025-06-23 ASSESSMENT — ENCOUNTER SYMPTOMS
SHORTNESS OF BREATH: 0
COUGH: 0
BLOOD IN STOOL: 0
COLOR CHANGE: 0
CONSTIPATION: 0
VOMITING: 0
DIARRHEA: 0
NAUSEA: 0

## 2025-06-23 NOTE — PROGRESS NOTES
[facility]  [facility]  [address]  [facility] [id number] [phone]     Patient: Ruth Sherman   YOB: 1980   Date: 6/23/2025   Visit Type:      History of Present Illness  Chief Complaint   Patient presents with    Follow-up     Right ankle       This is a 45 y.o. female presents today status post ankle arthroscopy with removal loose body and microfracture of the talus with cartilage grafting.  Date of suture 2/20/2025.  She does relate some limping with standing and walking.  She does relate swelling.  Relates tenderness.  She has been participating in physical therapy.  On her last exam she was prescribed gabapentin 100 mg once daily at night.  Was unable to tolerate twice daily due to drowsiness.  she was also prescribed a TENS unit device.  She has been utilizing the TENS unit device during and after therapy.  She comes in today for follow-up.  She relates she did quite a bit of walking last week in [place].  She does relate it is hard to tell how much pain relief she has gotten from the procedure but does feel like she is making improvements slowly.  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  Past Medical History:   Diagnosis Date    Anxiety     Bell's palsy     Chronic migraine without aura, intractable, without status migrainosus 08/10/2017    CPAP (continuous positive airway pressure) dependence     Depression     GERD (gastroesophageal reflux disease)     Headache     Insomnia     Intractable migraine without aura and without status migrainosus 08/10/2017    Irritable bowel syndrome     Kidney stone 10/2017    Migraine     Narcolepsy without cataplexy     JAMES (obstructive sleep apnea)     Osteoarthritis     Periodic limb movement disorder (PLMD)     Restless legs syndrome     Sleep difficulties       Past Surgical History:   Procedure Laterality Date    ANKLE SURGERY Right 02/20/2025    CHOLECYSTECTOMY  05/10/2023

## 2025-06-23 NOTE — PROGRESS NOTES
[facility]  [facility]  [address]  [facility] [id number] [phone]     Patient: Ruth Sherman   YOB: 1980   Date: 6/23/2025   Visit Type:  Follow up    Body Part: Ankle right    What was the date of surgery? 2/20/25    Patient states her ankle is still pretty tender.  Limping with walking and some pain with walking.  Also swells up sometimes if she walks a lot.    Review of Systems    Review of Systems   Constitutional:  Negative for appetite change, chills, fatigue and fever.   Respiratory:  Negative for cough and shortness of breath.    Cardiovascular:  Negative for chest pain, palpitations and leg swelling.   Gastrointestinal:  Negative for blood in stool, constipation, diarrhea, nausea and vomiting.   Musculoskeletal:  Negative for arthralgias, gait problem and joint swelling.   Skin:  Negative for color change and wound.   Neurological:  Negative for weakness.

## 2025-06-24 RX ORDER — GABAPENTIN 100 MG/1
100 CAPSULE ORAL NIGHTLY
Qty: 30 CAPSULE | Refills: 2 | Status: SHIPPED | OUTPATIENT
Start: 2025-06-24 | End: 2025-09-22

## 2025-06-25 NOTE — PROGRESS NOTES
in upper and lower extremities.  Lymph:  No cervical or inguinal lymphadenopathy noted.  Sensation:  Grossly intact to light touch.  DTR:  Normal, no pathologic reflexes.  Coordination/balance:  Normal    Musculoskeletal:  Right Shoulder:    Tenderness to palpation anterioly  Motion: flexion 180, Abduction 150, ER 90, IR T12  Strength: 4/5  Stability: normal  Skin: normal incisions healed   + Della's + Empty Can     Radiology:  MRI SHOULDER RIGHT WO CONTRAST 6/10/2025  Motion limited study as described.  Rotator cuff tendinosis. Degenerative fraying and minimal linear intrasubstance fissuring/tearing of the distal supraspinatus and infraspinatus as well as partial-thickness/rim rent tear of the subscapularis. No full-thickness tear rotator cuff direction.  Long head biceps tendinosis and tenosynovitis with some medial subluxation of the tendon.  Vashe acromiale with secondary degenerative change. Degenerative change of the acromioclavicular joint.  Additional chronic/degenerative changes as described.        Both myself and supervising physician have reviewed and interpreted MRI.  We have also reviewed radiologist interpretation. She has partial thickness tearing/fraying of the rotator cuff with bicep tendinopathy. Bony cyst noted.      Assessment:   Encounter Diagnoses   Name Primary?    Right shoulder pain, unspecified chronicity Yes    Impingement of right shoulder     Partial nontraumatic tear of rotator cuff, right     Biceps tendinopathy, right         Plan:  Given the failure of conservative treatment, I have recommended surgical intervention.  The patient has attempted oral medications, therapy, and injections in the past without significant improvement.     I have explained the patient's diagnosis in detail.  After a detailed discussion, the patient would like to proceed with surgery.  We discussed the risks, benefits, alternatives, and postoperative course.  Risks include, but are not limited to,

## 2025-06-26 ENCOUNTER — OFFICE VISIT (OUTPATIENT)
Age: 45
End: 2025-06-26
Payer: OTHER GOVERNMENT

## 2025-06-26 VITALS — BODY MASS INDEX: 32.43 KG/M2 | WEIGHT: 165.2 LBS | HEIGHT: 60 IN

## 2025-06-26 DIAGNOSIS — M75.111 PARTIAL NONTRAUMATIC TEAR OF ROTATOR CUFF, RIGHT: ICD-10-CM

## 2025-06-26 DIAGNOSIS — M25.511 RIGHT SHOULDER PAIN, UNSPECIFIED CHRONICITY: Primary | ICD-10-CM

## 2025-06-26 DIAGNOSIS — M25.811 IMPINGEMENT OF RIGHT SHOULDER: ICD-10-CM

## 2025-06-26 DIAGNOSIS — M67.921 BICEPS TENDINOPATHY, RIGHT: ICD-10-CM

## 2025-06-26 PROCEDURE — 99204 OFFICE O/P NEW MOD 45 MIN: CPT | Performed by: PHYSICIAN ASSISTANT

## 2025-06-30 ENCOUNTER — PREP FOR PROCEDURE (OUTPATIENT)
Age: 45
End: 2025-06-30

## 2025-06-30 DIAGNOSIS — M75.111 PARTIAL NONTRAUMATIC TEAR OF ROTATOR CUFF, RIGHT: Primary | ICD-10-CM

## 2025-07-01 RX ORDER — SODIUM CHLORIDE 9 MG/ML
INJECTION, SOLUTION INTRAVENOUS PRN
Status: CANCELLED | OUTPATIENT
Start: 2025-07-10

## 2025-07-01 RX ORDER — SODIUM CHLORIDE 0.9 % (FLUSH) 0.9 %
5-40 SYRINGE (ML) INJECTION EVERY 12 HOURS SCHEDULED
Status: CANCELLED | OUTPATIENT
Start: 2025-07-10

## 2025-07-01 RX ORDER — SODIUM CHLORIDE 0.9 % (FLUSH) 0.9 %
5-40 SYRINGE (ML) INJECTION PRN
Status: CANCELLED | OUTPATIENT
Start: 2025-07-10

## 2025-07-03 ENCOUNTER — PATIENT MESSAGE (OUTPATIENT)
Dept: PRIMARY CARE CLINIC | Age: 45
End: 2025-07-03

## 2025-07-03 DIAGNOSIS — G47.00 INSOMNIA, UNSPECIFIED TYPE: ICD-10-CM

## 2025-07-03 RX ORDER — ESZOPICLONE 3 MG/1
TABLET, FILM COATED ORAL
Qty: 30 TABLET | Refills: 0 | Status: SHIPPED | OUTPATIENT
Start: 2025-07-03 | End: 2025-08-02

## 2025-07-03 NOTE — TELEPHONE ENCOUNTER
PDMP Monitoring:    Last PDMP [place] as Reviewed ([place]):  Review User Review Instant Review Result   WALIEBONI LEVINE 5/27/2025 10:28 AM Reviewed PDMP [1]     Urine Drug Screenings (1 yr)       POCT Rapid Drug Screen  Collected: 12/3/2024  8:05 AM (Final result)              POCT Rapid Drug Screen  Collected: 3/13/2023 (Final result)                  Medication Contract and Consent for Opioid Use Documents Filed       Patient Documents       Type of Document Status Date Received Received By Description    Medication Contract Received 5/1/2024  1:03 PM GENNY SINGH Medication Contract 4/2024    Medication Contract Received 12/3/2024  8:06 AM DARBY MORENO Medication Contract    Medication Contract Received 3/10/2025 10:24 AM GENNY SINGH Medication Contract  - Neurology

## 2025-07-07 ENCOUNTER — HOSPITAL ENCOUNTER (OUTPATIENT)
Dept: PREADMISSION TESTING | Age: 45
Discharge: HOME | End: 2025-07-11
Payer: OTHER GOVERNMENT

## 2025-07-07 NOTE — DISCHARGE INSTRUCTIONS
Upper Extremity Post-op Instructions  Dr. ZAVALETA      POST-OP CARE: Please follow these instructions closely!    Sling use: The sling is provided for your comfort and to ensure proper healing of your repair following surgery. Please  place the abduction pillow under your arm. Your surgery requires that you wear the sling:  __X__ At all times except bathing, dressing, and therapy. Also wear the sling during sleep.    IMPORTANT PHONE NUMBERS:  For emergencies, please call [phone]  You may reach Dr. Zavaleta or his office medical at [phone] EXT: [phone]  M-F 8:00am-5:00pm  After 5pm or on the weekends, please call [phone] to reach the doctor on call.  Call immediately if you have any of the following symptoms:  Elevated temperature above 101 degrees for more than 48hours after surgery  Persistent drainage from wound  Severe pain around surgical site  Calf pain  Any signs of infection    Dressings: Keep dressing/splint intact unless instructed otherwise below. SOME DRAINAGE IS NORMAL!     DO NOT touch or apply ointment to the incision.     DO NOT remove the steri-strips over the incisions (if you have steri-strips). They will         generally fall off on their own or can be removed 2 weeks after surgery.     If you have yellow gauze and it comes off, do not worry about it. Leave them off.    Signs of infection that warrant a phone call to our clinical line:     o Excessive drainage or redness     o Red streaking coming away from the incision  o Increased pain  o Increased temperature above 101 degrees    Sutures:  If your physician uses sutures in your knee, they will dissolve on their own and will not need to be removed.  Some black sutures occasionally used will need to be removed 10-14 days after surgery.    Bathing:    _X_ Keep your Steri-Strip dressings in place until follow up.  You may remove your cotton dressings leaving your Steri-Strips in place and begin showering on the 3rd day following surgery.  Water may  cascade over your adhesive dressing(s) and steri-strips, but do not submerge your dressing(s) and incision(s) in water.    Physical Therapy: Your physical therapy status will be discussed with you postoperatively and at your first post-op appointment. Some injuries will not require physical therapy.    Medications: You will be discharged with the appropriate medications following your surgery. Fill these at the pharmacy and take them as directed on the label. Not all of the medications below may be prescribed. Occasionally, other medications may be prescribed with specific instructions.    Percocet/Lortab (oxycodone/hydrocodone with tylenol) - Pain Medication, will cause drowsiness     o Take 1-2 tablets every 4-6 hours. DO NOT EXCEED 4,000mg of Tylenol in 24 hours.  **DO NOT MIX WITH ALCOHOL, DRIVE WHILE TAKING, OR TAKE with extra TYLENOL**    Colace (Docusate) - stool softener, used for constipation. Take this only if you feel constipated.    Zofran (Ondansetron) or Phenergan - Anti-nausea medication, will cause drowsiness    Aspirin 81 mg - all patients with upper extremity surgery should take one aspirin 81 mg tablet every 12 hours (twice daily) for 21 days after surgery    **If you are running low on pain medications, please notify us if you need a refill 24-48 hours prior to when you run out, so we can make arrangements to refill the prescription for you if we determine it is necessary**

## 2025-07-10 ENCOUNTER — HOSPITAL ENCOUNTER (OUTPATIENT)
Age: 45
Setting detail: OUTPATIENT SURGERY
Discharge: HOME | End: 2025-07-10
Attending: ORTHOPAEDIC SURGERY | Admitting: ORTHOPAEDIC SURGERY
Payer: OTHER GOVERNMENT

## 2025-07-10 ENCOUNTER — ANESTHESIA EVENT (OUTPATIENT)
Dept: OPERATING ROOM | Age: 45
End: 2025-07-10
Payer: OTHER GOVERNMENT

## 2025-07-10 ENCOUNTER — ANESTHESIA (OUTPATIENT)
Dept: OPERATING ROOM | Age: 45
End: 2025-07-10
Payer: OTHER GOVERNMENT

## 2025-07-10 VITALS
OXYGEN SATURATION: 97 % | WEIGHT: 165 LBS | TEMPERATURE: 96.7 F | RESPIRATION RATE: 20 BRPM | HEART RATE: 86 BPM | SYSTOLIC BLOOD PRESSURE: 105 MMHG | HEIGHT: 60 IN | BODY MASS INDEX: 32.39 KG/M2 | DIASTOLIC BLOOD PRESSURE: 68 MMHG

## 2025-07-10 DIAGNOSIS — M75.111 PARTIAL NONTRAUMATIC TEAR OF ROTATOR CUFF, RIGHT: Primary | ICD-10-CM

## 2025-07-10 PROCEDURE — 6360000002 HC RX W HCPCS

## 2025-07-10 PROCEDURE — 3700000001 HC ADD 15 MINUTES (ANESTHESIA): Performed by: ORTHOPAEDIC SURGERY

## 2025-07-10 PROCEDURE — 6370000000 HC RX 637 (ALT 250 FOR IP): Performed by: ANESTHESIOLOGY

## 2025-07-10 PROCEDURE — 7100000011 HC PHASE II RECOVERY - ADDTL 15 MIN: Performed by: ORTHOPAEDIC SURGERY

## 2025-07-10 PROCEDURE — 7100000010 HC PHASE II RECOVERY - FIRST 15 MIN: Performed by: ORTHOPAEDIC SURGERY

## 2025-07-10 PROCEDURE — 6360000002 HC RX W HCPCS: Performed by: ORTHOPAEDIC SURGERY

## 2025-07-10 PROCEDURE — 7100000001 HC PACU RECOVERY - ADDTL 15 MIN: Performed by: ORTHOPAEDIC SURGERY

## 2025-07-10 PROCEDURE — 6360000002 HC RX W HCPCS: Performed by: ANESTHESIOLOGY

## 2025-07-10 PROCEDURE — 2500000003 HC RX 250 WO HCPCS: Performed by: ANESTHESIOLOGY

## 2025-07-10 PROCEDURE — 3600000004 HC SURGERY LEVEL 4 BASE: Performed by: ORTHOPAEDIC SURGERY

## 2025-07-10 PROCEDURE — 3600000014 HC SURGERY LEVEL 4 ADDTL 15MIN: Performed by: ORTHOPAEDIC SURGERY

## 2025-07-10 PROCEDURE — C1713 ANCHOR/SCREW BN/BN,TIS/BN: HCPCS | Performed by: ORTHOPAEDIC SURGERY

## 2025-07-10 PROCEDURE — 2709999900 HC NON-CHARGEABLE SUPPLY: Performed by: ORTHOPAEDIC SURGERY

## 2025-07-10 PROCEDURE — C1763 CONN TISS, NON-HUMAN: HCPCS | Performed by: ORTHOPAEDIC SURGERY

## 2025-07-10 PROCEDURE — 2580000003 HC RX 258: Performed by: ANESTHESIOLOGY

## 2025-07-10 PROCEDURE — 6370000000 HC RX 637 (ALT 250 FOR IP)

## 2025-07-10 PROCEDURE — 7100000000 HC PACU RECOVERY - FIRST 15 MIN: Performed by: ORTHOPAEDIC SURGERY

## 2025-07-10 PROCEDURE — 2500000003 HC RX 250 WO HCPCS

## 2025-07-10 PROCEDURE — 64415 NJX AA&/STRD BRCH PLXS IMG: CPT

## 2025-07-10 PROCEDURE — C1776 JOINT DEVICE (IMPLANTABLE): HCPCS | Performed by: ORTHOPAEDIC SURGERY

## 2025-07-10 PROCEDURE — 2500000003 HC RX 250 WO HCPCS: Performed by: ORTHOPAEDIC SURGERY

## 2025-07-10 PROCEDURE — 2720000010 HC SURG SUPPLY STERILE: Performed by: ORTHOPAEDIC SURGERY

## 2025-07-10 PROCEDURE — 3700000000 HC ANESTHESIA ATTENDED CARE: Performed by: ORTHOPAEDIC SURGERY

## 2025-07-10 DEVICE — ANCHOR TEND 8 FOR REGENETEN BIOINDUCTIVE IMPL SYS: Type: IMPLANTABLE DEVICE | Site: SHOULDER | Status: FUNCTIONAL

## 2025-07-10 DEVICE — LOOP N TACK 3.9 TENODESIS SYS: Type: IMPLANTABLE DEVICE | Site: SHOULDER | Status: FUNCTIONAL

## 2025-07-10 DEVICE — SP 2.6 MM KNOTLESS FIBERTAK ANCHOR: Type: IMPLANTABLE DEVICE | Site: SHOULDER | Status: FUNCTIONAL

## 2025-07-10 DEVICE — BONE ANCHORS 3 WITH ARTHROSCOPIC DELIVERY SYSTEM ADVANCED
Type: IMPLANTABLE DEVICE | Site: SHOULDER | Status: FUNCTIONAL
Brand: BONE ANCHORS WITH ARTHROSCOPIC DELIVERY SYSTEM - ADVANCED

## 2025-07-10 DEVICE — IMPLANTABLE DEVICE
Type: IMPLANTABLE DEVICE | Site: SHOULDER | Status: FUNCTIONAL
Brand: BIOINDUCTIVE IMPLANT WITH ARTHROSCOPIC DELIVERY SYSTEM - MEDIUM

## 2025-07-10 RX ORDER — PROPOFOL 10 MG/ML
INJECTION, EMULSION INTRAVENOUS
Status: DISCONTINUED | OUTPATIENT
Start: 2025-07-10 | End: 2025-07-10 | Stop reason: SDUPTHER

## 2025-07-10 RX ORDER — SODIUM CHLORIDE 9 MG/ML
INJECTION, SOLUTION INTRAVENOUS PRN
Status: DISCONTINUED | OUTPATIENT
Start: 2025-07-10 | End: 2025-07-10 | Stop reason: HOSPADM

## 2025-07-10 RX ORDER — APREPITANT 40 MG/1
40 CAPSULE ORAL ONCE
Status: COMPLETED | OUTPATIENT
Start: 2025-07-10 | End: 2025-07-10

## 2025-07-10 RX ORDER — ASPIRIN 81 MG/1
81 TABLET ORAL 2 TIMES DAILY
Qty: 42 TABLET | Refills: 0 | Status: SHIPPED | OUTPATIENT
Start: 2025-07-10 | End: 2025-07-31

## 2025-07-10 RX ORDER — SODIUM CHLORIDE, SODIUM LACTATE, POTASSIUM CHLORIDE, CALCIUM CHLORIDE 600; 310; 30; 20 MG/100ML; MG/100ML; MG/100ML; MG/100ML
INJECTION, SOLUTION INTRAVENOUS CONTINUOUS
Status: DISCONTINUED | OUTPATIENT
Start: 2025-07-10 | End: 2025-07-10 | Stop reason: HOSPADM

## 2025-07-10 RX ORDER — SODIUM CHLORIDE 0.9 % (FLUSH) 0.9 %
5-40 SYRINGE (ML) INJECTION EVERY 12 HOURS SCHEDULED
Status: DISCONTINUED | OUTPATIENT
Start: 2025-07-10 | End: 2025-07-10 | Stop reason: HOSPADM

## 2025-07-10 RX ORDER — SODIUM CHLORIDE 0.9 % (FLUSH) 0.9 %
5-40 SYRINGE (ML) INJECTION PRN
Status: DISCONTINUED | OUTPATIENT
Start: 2025-07-10 | End: 2025-07-10 | Stop reason: HOSPADM

## 2025-07-10 RX ORDER — CYCLOBENZAPRINE HCL 10 MG
10 TABLET ORAL 3 TIMES DAILY PRN
Qty: 30 TABLET | Refills: 0 | Status: SHIPPED | OUTPATIENT
Start: 2025-07-10 | End: 2025-07-20

## 2025-07-10 RX ORDER — HYDROMORPHONE HYDROCHLORIDE 1 MG/ML
0.5 INJECTION, SOLUTION INTRAMUSCULAR; INTRAVENOUS; SUBCUTANEOUS EVERY 5 MIN PRN
Status: DISCONTINUED | OUTPATIENT
Start: 2025-07-10 | End: 2025-07-10 | Stop reason: HOSPADM

## 2025-07-10 RX ORDER — ROCURONIUM BROMIDE 10 MG/ML
INJECTION, SOLUTION INTRAVENOUS
Status: DISCONTINUED | OUTPATIENT
Start: 2025-07-10 | End: 2025-07-10 | Stop reason: SDUPTHER

## 2025-07-10 RX ORDER — SCOPOLAMINE 1 MG/3D
1 PATCH, EXTENDED RELEASE TRANSDERMAL
Status: DISCONTINUED | OUTPATIENT
Start: 2025-07-10 | End: 2025-07-10 | Stop reason: HOSPADM

## 2025-07-10 RX ORDER — MIDAZOLAM HYDROCHLORIDE 2 MG/2ML
2 INJECTION, SOLUTION INTRAMUSCULAR; INTRAVENOUS
Status: COMPLETED | OUTPATIENT
Start: 2025-07-10 | End: 2025-07-10

## 2025-07-10 RX ORDER — DEXAMETHASONE SODIUM PHOSPHATE 4 MG/ML
4 INJECTION, SOLUTION INTRA-ARTICULAR; INTRALESIONAL; INTRAMUSCULAR; INTRAVENOUS; SOFT TISSUE ONCE
Status: DISCONTINUED | OUTPATIENT
Start: 2025-07-10 | End: 2025-07-10 | Stop reason: HOSPADM

## 2025-07-10 RX ORDER — DOCUSATE SODIUM 100 MG/1
100 CAPSULE, LIQUID FILLED ORAL 2 TIMES DAILY
Qty: 60 CAPSULE | Refills: 1 | Status: SHIPPED | OUTPATIENT
Start: 2025-07-10

## 2025-07-10 RX ORDER — IPRATROPIUM BROMIDE AND ALBUTEROL SULFATE 2.5; .5 MG/3ML; MG/3ML
SOLUTION RESPIRATORY (INHALATION)
Status: COMPLETED
Start: 2025-07-10 | End: 2025-07-10

## 2025-07-10 RX ORDER — LIDOCAINE HYDROCHLORIDE 10 MG/ML
INJECTION, SOLUTION INFILTRATION; PERINEURAL
Status: DISCONTINUED | OUTPATIENT
Start: 2025-07-10 | End: 2025-07-10 | Stop reason: SDUPTHER

## 2025-07-10 RX ORDER — HYDROMORPHONE HYDROCHLORIDE 1 MG/ML
0.25 INJECTION, SOLUTION INTRAMUSCULAR; INTRAVENOUS; SUBCUTANEOUS EVERY 5 MIN PRN
Status: DISCONTINUED | OUTPATIENT
Start: 2025-07-10 | End: 2025-07-10 | Stop reason: HOSPADM

## 2025-07-10 RX ORDER — ROPIVACAINE HYDROCHLORIDE 5 MG/ML
30 INJECTION, SOLUTION EPIDURAL; INFILTRATION; PERINEURAL ONCE
Status: DISCONTINUED | OUTPATIENT
Start: 2025-07-10 | End: 2025-07-10 | Stop reason: HOSPADM

## 2025-07-10 RX ORDER — IPRATROPIUM BROMIDE AND ALBUTEROL SULFATE 2.5; .5 MG/3ML; MG/3ML
1 SOLUTION RESPIRATORY (INHALATION) ONCE
Status: DISCONTINUED | OUTPATIENT
Start: 2025-07-10 | End: 2025-07-10 | Stop reason: HOSPADM

## 2025-07-10 RX ORDER — ONDANSETRON 4 MG/1
4 TABLET, FILM COATED ORAL EVERY 8 HOURS PRN
Qty: 20 TABLET | Refills: 1 | Status: SHIPPED | OUTPATIENT
Start: 2025-07-10

## 2025-07-10 RX ORDER — ROPIVACAINE HYDROCHLORIDE 5 MG/ML
INJECTION, SOLUTION EPIDURAL; INFILTRATION; PERINEURAL
Status: COMPLETED | OUTPATIENT
Start: 2025-07-10 | End: 2025-07-10

## 2025-07-10 RX ORDER — ONDANSETRON 2 MG/ML
4 INJECTION INTRAMUSCULAR; INTRAVENOUS
Status: COMPLETED | OUTPATIENT
Start: 2025-07-10 | End: 2025-07-10

## 2025-07-10 RX ORDER — FENTANYL CITRATE 50 UG/ML
INJECTION, SOLUTION INTRAMUSCULAR; INTRAVENOUS
Status: DISCONTINUED | OUTPATIENT
Start: 2025-07-10 | End: 2025-07-10 | Stop reason: SDUPTHER

## 2025-07-10 RX ORDER — OXYCODONE AND ACETAMINOPHEN 7.5; 325 MG/1; MG/1
1 TABLET ORAL EVERY 4 HOURS PRN
Qty: 50 TABLET | Refills: 0 | Status: SHIPPED | OUTPATIENT
Start: 2025-07-10 | End: 2025-07-18

## 2025-07-10 RX ORDER — CEFAZOLIN SODIUM 1 G/3ML
INJECTION, POWDER, FOR SOLUTION INTRAMUSCULAR; INTRAVENOUS
Status: DISCONTINUED | OUTPATIENT
Start: 2025-07-10 | End: 2025-07-10 | Stop reason: SDUPTHER

## 2025-07-10 RX ORDER — DEXAMETHASONE SODIUM PHOSPHATE 10 MG/ML
INJECTION, SOLUTION INTRAMUSCULAR; INTRAVENOUS
Status: DISCONTINUED | OUTPATIENT
Start: 2025-07-10 | End: 2025-07-10 | Stop reason: SDUPTHER

## 2025-07-10 RX ADMIN — FENTANYL CITRATE 50 MCG: 0.05 INJECTION, SOLUTION INTRAMUSCULAR; INTRAVENOUS at 10:04

## 2025-07-10 RX ADMIN — PHENYLEPHRINE HYDROCHLORIDE 100 MCG: 10 INJECTION INTRAVENOUS at 10:30

## 2025-07-10 RX ADMIN — APREPITANT 40 MG: 40 CAPSULE ORAL at 08:22

## 2025-07-10 RX ADMIN — PHENYLEPHRINE HYDROCHLORIDE 300 MCG: 10 INJECTION INTRAVENOUS at 10:13

## 2025-07-10 RX ADMIN — FENTANYL CITRATE 50 MCG: 0.05 INJECTION, SOLUTION INTRAMUSCULAR; INTRAVENOUS at 09:53

## 2025-07-10 RX ADMIN — SODIUM CHLORIDE, SODIUM LACTATE, POTASSIUM CHLORIDE, AND CALCIUM CHLORIDE: 600; 310; 30; 20 INJECTION, SOLUTION INTRAVENOUS at 08:20

## 2025-07-10 RX ADMIN — MIDAZOLAM HYDROCHLORIDE 2 MG: 1 INJECTION, SOLUTION INTRAMUSCULAR; INTRAVENOUS at 09:15

## 2025-07-10 RX ADMIN — PHENYLEPHRINE HYDROCHLORIDE 200 MCG: 10 INJECTION INTRAVENOUS at 10:48

## 2025-07-10 RX ADMIN — DEXAMETHASONE SODIUM PHOSPHATE 10 MG: 10 INJECTION, SOLUTION INTRAMUSCULAR; INTRAVENOUS at 10:10

## 2025-07-10 RX ADMIN — SODIUM CHLORIDE, PRESERVATIVE FREE 20 MG: 5 INJECTION INTRAVENOUS at 08:23

## 2025-07-10 RX ADMIN — CEFAZOLIN 2 G: 1 INJECTION, POWDER, FOR SOLUTION INTRAMUSCULAR; INTRAVENOUS at 10:06

## 2025-07-10 RX ADMIN — LIDOCAINE HYDROCHLORIDE 50 MG: 10 INJECTION, SOLUTION INFILTRATION; PERINEURAL at 09:53

## 2025-07-10 RX ADMIN — PROPOFOL 200 MG: 10 INJECTION, EMULSION INTRAVENOUS at 09:53

## 2025-07-10 RX ADMIN — IPRATROPIUM BROMIDE AND ALBUTEROL SULFATE 3 ML: 2.5; .5 SOLUTION RESPIRATORY (INHALATION) at 11:59

## 2025-07-10 RX ADMIN — SUGAMMADEX 200 MG: 100 INJECTION, SOLUTION INTRAVENOUS at 11:07

## 2025-07-10 RX ADMIN — PROPOFOL 180 MCG/KG/MIN: 10 INJECTION, EMULSION INTRAVENOUS at 09:52

## 2025-07-10 RX ADMIN — PHENYLEPHRINE HYDROCHLORIDE 100 MCG: 10 INJECTION INTRAVENOUS at 10:08

## 2025-07-10 RX ADMIN — ROCURONIUM BROMIDE 70 MG: 10 INJECTION, SOLUTION INTRAVENOUS at 09:54

## 2025-07-10 RX ADMIN — ROPIVACAINE HYDROCHLORIDE 20 ML: 5 INJECTION, SOLUTION EPIDURAL; INFILTRATION; PERINEURAL at 09:19

## 2025-07-10 RX ADMIN — ONDANSETRON 4 MG: 2 INJECTION INTRAMUSCULAR; INTRAVENOUS at 11:59

## 2025-07-10 ASSESSMENT — PAIN - FUNCTIONAL ASSESSMENT
PAIN_FUNCTIONAL_ASSESSMENT: NONE - DENIES PAIN
PAIN_FUNCTIONAL_ASSESSMENT: NONE - DENIES PAIN
PAIN_FUNCTIONAL_ASSESSMENT: 0-10

## 2025-07-10 ASSESSMENT — LIFESTYLE VARIABLES: SMOKING_STATUS: 0

## 2025-07-10 NOTE — H&P
Pt Name: Ruth Sherman  MRN: [id number]  YOB: 1980  Date: 7/10/2025      HPI: 45 y.o. year old female with a known rotator cuff tear of the right shoulder. Chronic pain and weakness have been non responsive to conservative treatments, not limited to, NSAIDs, corticosteroid injections, and physical therapy.      Past Medical/Surgical History:   Past Medical History:   Diagnosis Date    Anxiety     Bell's palsy     21 years old    Chronic migraine without aura, intractable, without status migrainosus 08/10/2017    Cutaneous lupus erythematosus     Depression     GERD (gastroesophageal reflux disease)     Headache     Insomnia     Intractable migraine without aura and without status migrainosus 08/10/2017    Irritable bowel syndrome     Kidney stone 10/2017    Migraine     Narcolepsy without cataplexy     JAMES (obstructive sleep apnea)     CPAP    Osteoarthritis     Periodic limb movement disorder (PLMD)     PONV (postoperative nausea and vomiting)     Restless legs syndrome     Sleep difficulties      Past Surgical History:   Procedure Laterality Date    ANKLE SURGERY Right 02/20/2025    Dr. Fernandez    CHOLECYSTECTOMY  05/10/2023    COLONOSCOPY  12/2024    CYSTOURETHROSCOPY/STENT REMOVAL      HYSTERECTOMY (CERVIX STATUS UNKNOWN) N/A 11/21/2019    TOTAL LAPAROSCOPIC HYSTERECTOMY WITH DAVINCI CYSTOSCOPY performed by Isamar Kelly MD at [facility] OR    TUBAL LIGATION      URETER STENT PLACEMENT      X 2         Social History:    Smoking:  No Smoking History = 0   Alcohol:Rare social consumption    Medications:   Prior to Admission medications    Medication Sig Start Date End Date Taking? Authorizing Provider   eszopiclone (LUNESTA) 3 MG TABS Take every night for sleep  Patient taking differently: Take 1 tablet by mouth nightly. Take every night for sleep 7/3/25 8/2/25 Yes Jenny Lynn MD   gabapentin (NEURONTIN) 100 MG capsule Take 1 capsule by mouth at bedtime for 90 days. Max Daily Amount: 100 mg        Allergies:   Allergies   Allergen Reactions    Doxycycline Hives and Itching    Minocycline Hives       Review of systems:     * Constitutional: negative     * HEENT: negative     * Skin: negative     * Cardiac: negative     * Respiratory: negative     * GI: negative     * : negative     * Neuro: negative     * CNS: negative     * Extremities: negative     * Endcrine: negative     Physical Exam:   /85   Pulse 73   Temp 97.2 °F (36.2 °C) (Temporal)   Resp 16   Ht 1.524 m (5')   Wt 74.8 kg (165 lb)   LMP 10/19/2019   SpO2 97%   BMI 32.22 kg/m²     - General: normal development and appearance     - HEENT: normocephalic, MIRA     - Skin: pink, warm, normal tone     - Neck: supple, no masses,     - Chest: no rales or wheezes, normal expansion     - Heart: RRR, no murmurs/gallops     - Abdomen: soft, nondistended, nontender, normal sounds     - Vascular: normal pulses, color, tone     - Pysch/Neuro: normal affect, mood, alert and oriented     - Musculoskeletal: Physical exam of the patient's right shoulder shows the skin is clean, dry, and intact. No deformities, lesions, or errythema noted. NVI distally and laterally. Tenderness to palpation laterally. Decreased range of motion and weakness secondary to pain.      Impression: Rotator cuff tear of the right shoulder.      Surgical Plan: Arthroscopic rotator cuff repair of the right shoulder.      Electronically signed by Haja De Jesus MD on 7/10/2025 at 9:23 AM

## 2025-07-10 NOTE — OP NOTE
Patient Name: Ruth Sherman   : 1980   MRN: [id number]     DATE of SURGERY: 7.10.2025    SURGEON: Haja De Jesus MD    ASSISTANT: NONE    PREOPERATIVE DIAGNOSIS:  1) Acute traumatic incomplete rotator cuff tear of the right shoulder  2) Subacromial impingement syndrome, right shoulder   3) Acromioclavicular joint primary osteoarthritis, right shoulder     POSTOPERATIVE DIAGNOSIS:  1) Acute traumatic incomplete rotator cuff tear of the right shoulder  2) Subacromial impingement syndrome, right shoulder   3) Acromioclavicular joint primary osteoarthritis, right shoulder     PROCEDURE PERFORMED:  1) Right shoulder arthroscopic rotator cuff repair with bio inductive patch augmentation  2) Right shoulder arthroscopic subacromial decompression  3) Right shoulder arthroscopic distal clavicle excision    IMPLANTS: Arthrex 3.9 mm BioSwiveLock anchor x 1, Arthrex 2.6 mm FiberTak anchor x 2, [employer] Regeneten bio inductive patch    ANESTHESIA USED: General endotracheal anesthesia, interscalene block    ESTIMATED BLOOD LOSS: minimal    DRAINS: none     COMPLICATIONS: none    SPECIMENS: none    OPERATIVE INDICATIONS: This patient is a 45 y.o. female  presented to my clinic with complaints of progressive shoulder pain after a traumatic injury to the shoulder. An MRI was obtained which showed the above-named diagnoses. Pain was not relieved with conservative treatment consisting of anti-inflammatories, corticosteroid injections or physical therapy.  Due to progressive pain and loss of function, surgical evaluation was discussed, and the patient wished to proceed understanding risks, benefits, and alternatives. The surgical indication was to relieve pain, improve function, and prevent future disability in regard to the shoulder pathology dictated in the above-named diagnoses.  Risks included, but were not limited to, that of anesthesia, bleeding, infection, pain, damage to local structures, need for further  tear of the rotator cuff and a shaver was used to debride the tendon.    The arthroscope was then transitioned to the subacromial space, and an outside-in technique was used to establish a lateral portal.  The arthroscopic shaver was introduced in the subacromial space and a complete bursectomy was performed with this device.  Given the known high concentration of MSC's in the bursal tissue, a filter was applied to the arthroscopic shaver to collect the tissue for later reimplantation to help biologically augment the repair and support healing.     Attention was turned to the anterior and lateral edge of the acromion where soft tissue was removed with a cautery device.  Due to a prominent acromial spur causing impingement, an acromioplasty was performed with an arthroscopic orestes converting this to a flat type 1 morphology.  The coracoacromial ligament was incised with a cautery device completing the subacromial decompression.    Attention was then turned to the acromioclavicular joint where a cautery device was utilized to remove soft tissue from the distal end of the clavicle revealing decreased joint space and osteophyte formation.  An arthroscopic orestes was introduced through the anterior portal removing the entire articular portion of the distal clavicle.  Approximately 9 mm of bone was removed, preserving the posterior and superior ligaments to prevent instability of the joint.  The arthroscope was transitioned anteriorly, noting the decompression to be adequate.    Attention was then turned to the rotator cuff tear which was visualized from the intraarticular space.  The tendon and greater tuberosity footprint were debrided followed by placement of 2 separate Arthrex 2.6 mm FiberTak anchors that were placed through the intact bursal fibers and medial to the retracted articular fibers at the anterior and posterior extents of the tear.  The repair suture from each of the anchors was shuttled through the adjacent

## 2025-07-10 NOTE — ANESTHESIA PROCEDURE NOTES
Peripheral Block    Patient location during procedure: holding area  Reason for block: post-op pain management  Start time: 7/10/2025 9:19 AM  End time: 7/10/2025 9:21 AM  Staffing  Performed: anesthesiologist   Anesthesiologist: Ubaldo Avina MD  Performed by: Ubaldo Avina MD  Authorized by: Ubaldo Avina MD    Preanesthetic Checklist  Completed: patient identified, IV checked, site marked, risks and benefits discussed, surgical/procedural consents, equipment checked, pre-op evaluation, timeout performed, anesthesia consent given, oxygen available, monitors applied/VS acknowledged, fire risk safety assessment completed and verbalized and blood product R/B/A discussed and consented  Peripheral Block   Patient position: supine  Prep: ChloraPrep  Provider prep: mask and sterile gloves  Patient monitoring: continuous pulse ox and frequent blood pressure checks  Block type: Brachial plexus  Interscalene  Laterality: right  Injection technique: single-shot  Guidance: ultrasound guided  Local infiltration: lidocaine  Local infiltration: lidocaine    Needle   Needle type: Quincke   Needle gauge: 20 G  Needle localization: ultrasound guidance  Needle length: 10 cm  Assessment   Injection assessment: negative aspiration for heme, no paresthesia on injection, local visualized surrounding nerve on ultrasound and no intravascular symptoms  Paresthesia pain: none  Slow fractionated injection: yes  Hemodynamics: stable  Outcomes: uncomplicated and patient tolerated procedure well    Medications Administered  ropivacaine (NAROPIN) injection 0.5% - Perineural   20 mL - 7/10/2025 9:19:00 AM

## 2025-07-10 NOTE — PROGRESS NOTES
CLINICAL PHARMACY NOTE: MEDS TO BEDS    Total # of Prescriptions Filled: 5   The following medications were delivered to the patient:  Discharge Medication List as of 7/10/2025 12:44 PM        START taking these medications    Details   oxyCODONE-acetaminophen (PERCOCET) 7.5-325 MG per tablet Take 1 tablet by mouth every 4 hours as needed for Pain for up to 8 days. Intended supply: 7 days Max Daily Amount: 6 tablets, Disp-50 tablet, R-0Normal      cyclobenzaprine (FLEXERIL) 10 MG tablet Take 1 tablet by mouth 3 times daily as needed for Muscle spasms, Disp-30 tablet, R-0Normal      docusate sodium (COLACE) 100 MG capsule Take 1 capsule by mouth 2 times daily, Disp-60 capsule, R-1Normal      ondansetron (ZOFRAN) 4 MG tablet Take 1 tablet by mouth every 8 hours as needed for Nausea or Vomiting, Disp-20 tablet, R-1Normal      aspirin 81 MG EC tablet Take 1 tablet by mouth 2 times daily for 21 days, Disp-42 tablet, R-0Normal               Additional Documentation:   Handed scripts to patient at [facility]. Paid with card.

## 2025-07-10 NOTE — ANESTHESIA POSTPROCEDURE EVALUATION
Department of Anesthesiology  Postprocedure Note    Patient: Ruth Sherman  MRN: [id number]  YOB: 1980  Date of evaluation: 7/10/2025    Procedure Summary       Date: 07/10/25 Room / Location: [address]    Anesthesia Start: 0945 Anesthesia Stop: 1131    Procedure: RIGHT SHOULDER ARTHROSCOPY PARTIAL THICKNESS ROTATOR CUFF REPAIR WITH DISTAL CLAVICLE EXCISION AND BICEPS TENOTOMY (Right: Shoulder) Diagnosis:       Partial nontraumatic tear of rotator cuff, right      (Partial nontraumatic tear of rotator cuff, right [M75.111])    Surgeons: Haja De Jesus MD Responsible Provider: Yusef Fernandez APRN - CRNA    Anesthesia Type: general, TIVA, regional ASA Status: 2            Anesthesia Type: No value filed.    Gladis Phase I: Gladis Score: 10    Gladis Phase II:      Anesthesia Post Evaluation    Patient location during evaluation: PACU  Patient participation: waiting for patient participation  Level of consciousness: sleepy but conscious  Pain score: 0  Airway patency: patent  Nausea & Vomiting: no vomiting and no nausea  Cardiovascular status: blood pressure returned to baseline and hemodynamically stable  Respiratory status: acceptable and face mask  Hydration status: stable  Comments: Blood pressure 110/67, pulse 74, temperature 97. °F (36.2 °C), temperature source Temporal, resp. rate 15, height 1.524 m (5'), weight 74.8 kg (165 lb), , SpO2 91%, not currently breastfeeding.    Pain management: adequate    No notable events documented.

## 2025-07-10 NOTE — ANESTHESIA PRE PROCEDURE
TWICE PER WEEK 4/28/25   Jenny Lynn MD   sucralfate (CARAFATE) 1 GM tablet Take 1 tablet by mouth as needed (gerd) 1/20/25   Claudia Castaneda MD   modafinil (PROVIGIL) 200 MG tablet Take 0.25 tablets by mouth daily. 8/7/24   Claudia Castaneda MD   triamcinolone (ARISTOCORT) 0.5 % ointment Apply topically 2 times daily Apply topically 2 times daily.    Claudia Castaneda MD   valACYclovir (VALTREX) 1 g tablet Take 2 tablets by mouth 2 times daily as needed (PRN) Indications: COLD SORES 11/6/23   Jenny Lynn MD   clobetasol (TEMOVATE) 0.05 % cream clobetasol 0.05 % topical cream   APPLY TWICE DAILY TO RASH UP TO 2 WEEKS/MONTH AS NEEDED    Claudia Castaneda MD   glycopyrrolate (ROBINUL) 2 MG tablet Take 1 tablet by mouth 2 times daily    Claudia Castaneda MD       Current medications:    Current Facility-Administered Medications   Medication Dose Route Frequency Provider Last Rate Last Admin   • ceFAZolin (ANCEF) 2,000 mg in sterile water 20 mL IV syringe  2,000 mg IntraVENous On Call to OR Haja De Jesus MD       • sodium chloride flush 0.9 % injection 5-40 mL  5-40 mL IntraVENous 2 times per day Haja De Jesus MD       • sodium chloride flush 0.9 % injection 5-40 mL  5-40 mL IntraVENous PRN Haja De Jesus MD       • 0.9 % sodium chloride infusion   IntraVENous PRN Haja De Jesus MD           Allergies:    Allergies   Allergen Reactions   • Doxycycline Hives and Itching   • Minocycline Hives       Problem List:    Patient Active Problem List   Diagnosis Code   • Obstructive sleep apnea G47.33   • Restless leg syndrome G25.81   • Insomnia G47.00   • Snoring R06.83   • Periodic limb movement disorder G47.61   • Intractable migraine without aura and without status migrainosus G43.019   • Chronic migraine without aura, intractable, without status migrainosus G43.719   • Menorrhagia with regular cycle N92.0   • Depression F32.A   • Somnolence, daytime R40.0   • Intolerance of

## 2025-07-10 NOTE — BRIEF OP NOTE
Brief Postoperative Note      Patient: Ruth Sherman  YOB: 1980  MRN: [id number]    Date of Procedure: 7/10/2025    Pre-Op Diagnosis Codes:      * Partial nontraumatic tear of rotator cuff, right [M75.111]    Post-Op Diagnosis: Same       Procedure(s):  RIGHT SHOULDER ARTHROSCOPY PARTIAL THICKNESS ROTATOR CUFF REPAIR WITH DISTAL CLAVICLE EXCISION AND BICEPS TENOTOMY    Surgeon(s):  Haja De Jesus MD    Assistant:  * No surgical staff found *    Anesthesia: General    Estimated Blood Loss (mL): Minimal    Complications: None    Specimens:   * No specimens in log *    Implants:  Implant Name Type Inv. Item Serial No.  Lot No. LRB No. Used Action   LOOP N TACK 3.9 TENODESIS SYS - [id number]  LOOP N TACK 3.9 TENODESIS SYS  [employer] [id number] Right 1 Implanted   SP 2.6 MM KNOTLESS FIBERTAK ANCHOR - [id number]  SP 2.6 MM KNOTLESS FIBERTAK ANCHOR  [employer] [id number] Right 1 Implanted   SP 2.6 MM KNOTLESS FIBERTAK ANCHOR - [id number]  SP 2.6 MM KNOTLESS FIBERTAK ANCHOR  [employer] [id number] Right 1 Implanted   ANCHOR BONE 3 W/DEL SYS - [id number] Fastener ANCHOR BONE 3 W/DEL SYS  [employer] [id number] Right 1 Implanted   ANCHOR TEND 8 FOR REGENETEN BIOINDUCTIVE IMPL SYS - [id number]  ANCHOR TEND 8 FOR REGENETEN BIOINDUCTIVE IMPL SYS  [employer] [id number] Right 1 Implanted   IMPLANT BIOINDUCTIVE M BOV ACHILLES TEND W/ ARTHSCP DEL SYS - [id number]  IMPLANT BIOINDUCTIVE M BOV ACHILLES TEND W/ ARTHSCP DEL SYS  [employer] [id number] Right 1 Implanted         Drains: * No LDAs found *    Findings:  Infection Present At Time Of Surgery (PATOS) (choose all levels that have infection present):  No infection present  Other Findings: Partial-thickness articular sided tearing of the supraspinatus with subacromial spurring and distal clavicle arthrosis with bursal sided tendinosis, right shoulder    Electronically signed by Haja De Jesus MD on 7/10/2025 at

## 2025-07-29 ENCOUNTER — OFFICE VISIT (OUTPATIENT)
Age: 45
End: 2025-07-29

## 2025-07-29 VITALS — HEIGHT: 60 IN | WEIGHT: 165 LBS | BODY MASS INDEX: 32.39 KG/M2

## 2025-07-29 DIAGNOSIS — M75.111 PARTIAL NONTRAUMATIC TEAR OF ROTATOR CUFF, RIGHT: Primary | ICD-10-CM

## 2025-07-29 PROCEDURE — 99024 POSTOP FOLLOW-UP VISIT: CPT

## 2025-07-29 NOTE — PROGRESS NOTES
[facility]  [facility]  [address] [facility] [place] [address]  Dept: [phone]  Dept Fax: [phone]  Loc: [phone]  Orthopaedic Clinic Note - Established Patient    NAME:  Ruth Sherman   : 1980  MRN: [id number]      2025      CHIEF COMPLAINT:   Chief Complaint   Patient presents with    Post-Op Check     Right shoulder       HISTORY OF PRESENT ILLNESS:     The patient is a 45 y.o. female who presents to the clinic today for the above listed problem.  Patient is here today for a 3-week postoperative follow-up from rotator cuff repair.  Patient states she is doing well.  She is still having some pain and is still taking muscle relaxers for spasms however has not had any pain medication in approximately 3 days.  She is still taking some over-the-counter meds such as Tylenol and ibuprofen.    Past Medical History:        Diagnosis Date    Anxiety     Bell's palsy     21 years old    Chronic migraine without aura, intractable, without status migrainosus 08/10/2017    Cutaneous lupus erythematosus     Depression     GERD (gastroesophageal reflux disease)     Headache     Insomnia     Intractable migraine without aura and without status migrainosus 08/10/2017    Irritable bowel syndrome     Kidney stone 10/2017    Migraine     Narcolepsy without cataplexy     JAMES (obstructive sleep apnea)     CPAP    Osteoarthritis     Periodic limb movement disorder (PLMD)     PONV (postoperative nausea and vomiting)     Restless legs syndrome     Sleep difficulties        Past Surgical History:        Procedure Laterality Date    ANKLE SURGERY Right 2025    Dr. Fernandez    CHOLECYSTECTOMY  05/10/2023    COLONOSCOPY  2024    CYSTOURETHROSCOPY/STENT REMOVAL      HYSTERECTOMY (CERVIX STATUS UNKNOWN) N/A 2019    TOTAL LAPAROSCOPIC HYSTERECTOMY WITH DAVINCI CYSTOSCOPY performed by Isamar Kelly MD at [facility] OR    SHOULDER ARTHROSCOPY Right 7/10/2025

## 2025-07-30 RX ORDER — VALACYCLOVIR HYDROCHLORIDE 1 G/1
2000 TABLET, FILM COATED ORAL 2 TIMES DAILY PRN
Qty: 90 TABLET | Refills: 3 | Status: SHIPPED | OUTPATIENT
Start: 2025-07-30

## 2025-08-15 ENCOUNTER — APPOINTMENT (OUTPATIENT)
Dept: GENERAL RADIOLOGY | Age: 45
End: 2025-08-15
Payer: OTHER GOVERNMENT

## 2025-08-15 ENCOUNTER — HOSPITAL ENCOUNTER (EMERGENCY)
Age: 45
Discharge: HOME | End: 2025-08-15
Attending: STUDENT IN AN ORGANIZED HEALTH CARE EDUCATION/TRAINING PROGRAM
Payer: OTHER GOVERNMENT

## 2025-08-15 VITALS
BODY MASS INDEX: 32.39 KG/M2 | HEART RATE: 90 BPM | HEIGHT: 60 IN | WEIGHT: 165 LBS | OXYGEN SATURATION: 98 % | SYSTOLIC BLOOD PRESSURE: 123 MMHG | RESPIRATION RATE: 16 BRPM | TEMPERATURE: 97.6 F | DIASTOLIC BLOOD PRESSURE: 81 MMHG

## 2025-08-15 DIAGNOSIS — M25.571 ACUTE RIGHT ANKLE PAIN: ICD-10-CM

## 2025-08-15 DIAGNOSIS — M25.572 ACUTE LEFT ANKLE PAIN: ICD-10-CM

## 2025-08-15 DIAGNOSIS — M25.511 ACUTE SHOULDER PAIN DUE TO TRAUMA, RIGHT: ICD-10-CM

## 2025-08-15 DIAGNOSIS — G89.11 ACUTE SHOULDER PAIN DUE TO TRAUMA, RIGHT: ICD-10-CM
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
## 2025-08-15 PROCEDURE — 73610 X-RAY EXAM OF ANKLE: CPT

## 2025-08-15 PROCEDURE — 99283 EMERGENCY DEPT VISIT LOW MDM: CPT

## 2025-08-15 PROCEDURE — 6370000000 HC RX 637 (ALT 250 FOR IP): Performed by: STUDENT IN AN ORGANIZED HEALTH CARE EDUCATION/TRAINING PROGRAM

## 2025-08-15 PROCEDURE — 73030 X-RAY EXAM OF SHOULDER: CPT

## 2025-08-15 RX ORDER — HYDROCODONE BITARTRATE AND ACETAMINOPHEN 5; 325 MG/1; MG/1
1 TABLET ORAL ONCE
Status: COMPLETED | OUTPATIENT
Start: 2025-08-15 | End: 2025-08-15

## 2025-08-15 RX ADMIN — HYDROCODONE BITARTRATE AND ACETAMINOPHEN 1 TABLET: 5; 325 TABLET ORAL at 09:29

## 2025-08-15 ASSESSMENT — PAIN DESCRIPTION - LOCATION
LOCATION: SHOULDER
LOCATION: ANKLE;SHOULDER

## 2025-08-15 ASSESSMENT — PAIN DESCRIPTION - DESCRIPTORS: DESCRIPTORS: THROBBING;TENDER

## 2025-08-15 ASSESSMENT — ENCOUNTER SYMPTOMS
SORE THROAT: 0
NAUSEA: 0
ABDOMINAL PAIN: 0
COUGH: 0
CHEST TIGHTNESS: 0
VOMITING: 0
EYE REDNESS: 0
EYE PAIN: 0
SHORTNESS OF BREATH: 0
DIARRHEA: 0

## 2025-08-15 ASSESSMENT — PAIN DESCRIPTION - PAIN TYPE: TYPE: ACUTE PAIN

## 2025-08-15 ASSESSMENT — PAIN DESCRIPTION - ORIENTATION
ORIENTATION: RIGHT
ORIENTATION: RIGHT

## 2025-08-15 ASSESSMENT — PAIN SCALES - GENERAL
PAINLEVEL_OUTOF10: 8
PAINLEVEL_OUTOF10: 6
PAINLEVEL_OUTOF10: 6

## 2025-08-15 ASSESSMENT — PAIN - FUNCTIONAL ASSESSMENT
PAIN_FUNCTIONAL_ASSESSMENT: 0-10
PAIN_FUNCTIONAL_ASSESSMENT: 0-10
PAIN_FUNCTIONAL_ASSESSMENT: PREVENTS OR INTERFERES SOME ACTIVE ACTIVITIES AND ADLS

## 2025-08-18 ENCOUNTER — OFFICE VISIT (OUTPATIENT)
Age: 45
End: 2025-08-18
Payer: COMMERCIAL

## 2025-08-18 VITALS — BODY MASS INDEX: 32.39 KG/M2 | HEIGHT: 60 IN | WEIGHT: 165 LBS

## 2025-08-18 DIAGNOSIS — M75.111 PARTIAL NONTRAUMATIC TEAR OF ROTATOR CUFF, RIGHT: Primary | ICD-10-CM

## 2025-08-18 DIAGNOSIS — G47.00 INSOMNIA, UNSPECIFIED TYPE: ICD-10-CM

## 2025-08-18 PROCEDURE — 99213 OFFICE O/P EST LOW 20 MIN: CPT

## 2025-08-18 RX ORDER — ESZOPICLONE 3 MG/1
3 TABLET, FILM COATED ORAL NIGHTLY
Qty: 30 TABLET | Refills: 2 | Status: SHIPPED | OUTPATIENT
Start: 2025-08-18 | End: 2025-11-16

## 2025-08-25 ENCOUNTER — HOSPITAL ENCOUNTER (OUTPATIENT)
Dept: MRI IMAGING | Age: 45
Discharge: HOME | End: 2025-08-25
Payer: COMMERCIAL

## 2025-08-25 DIAGNOSIS — M75.111 PARTIAL NONTRAUMATIC TEAR OF ROTATOR CUFF, RIGHT: ICD-10-CM

## 2025-08-25 PROCEDURE — 73221 MRI JOINT UPR EXTREM W/O DYE: CPT

## 2025-09-03 ENCOUNTER — OFFICE VISIT (OUTPATIENT)
Age: 45
End: 2025-09-03

## 2025-09-03 VITALS — WEIGHT: 165 LBS | BODY MASS INDEX: 32.39 KG/M2 | HEIGHT: 60 IN

## 2025-09-03 DIAGNOSIS — Z48.89 AFTERCARE FOLLOWING SURGERY: Primary | ICD-10-CM

## 2025-09-03 PROCEDURE — 99024 POSTOP FOLLOW-UP VISIT: CPT

## (undated) DEVICE — DAVINCI: Brand: MEDLINE INDUSTRIES, INC.

## (undated) DEVICE — DRESSING,GAUZE,XEROFORM,CURAD,5"X9",ST: Brand: CURAD

## (undated) DEVICE — 4.5 MM INCISOR PLUS STRAIGHT                                    BLADES, POWER/EP-1, VIOLET, PACKAGED                                    6 PER BOX, STERILE

## (undated) DEVICE — SURGICAL SUCTION CONNECTING TUBE WITH MALE CONNECTOR AND SUCTION CLAMP, 2 FT. LONG (.6 M), 5 MM I.D.: Brand: CONMED

## (undated) DEVICE — BANDAGE,GAUZE,BULKEE II,4.5"X4.1YD,STRL: Brand: MEDLINE

## (undated) DEVICE — PK KN ARTHSCP 30

## (undated) DEVICE — T-MAX DISPOSABLE FACE MASK 8 PER BOX

## (undated) DEVICE — PROB ABLAT SERFAS ENERGY 90S 3.5MM

## (undated) DEVICE — TUBING, SUCTION, 1/4" X 12', STRAIGHT: Brand: MEDLINE

## (undated) DEVICE — TUBING, SUCTION, 1/4" X 20', STRAIGHT: Brand: MEDLINE INDUSTRIES, INC.

## (undated) DEVICE — MARROW ACCESS DEVICE IN PRODUCT BOX: Brand: SMARTSHOT ANKLE

## (undated) DEVICE — Device

## (undated) DEVICE — FRAZIER SUCTION INSTRUMENT 10 FR W/CONTROL VENT & OBTURATOR: Brand: FRAZIER

## (undated) DEVICE — DISPOSABLE TOURNIQUET CUFF 34"X4", 1-LINE, BLUE, STERILE, 1EA/PK, 10PK/CS: Brand: ASP MEDICAL

## (undated) DEVICE — KIT BNE CEM KNEE W/ L APPL MIX AND DEL SYS

## (undated) DEVICE — 4-PORT MANIFOLD: Brand: NEPTUNE 2

## (undated) DEVICE — VCARE MEDIUM, UTERINE MANIPULATOR, VAGINAL-CERVICAL-AHLUWALIA'S-RETRACTOR-ELEVATOR: Brand: VCARE

## (undated) DEVICE — TRI-LUMEN FILTERED TUBE SET WITH ACTIVATED CHARCOAL FILTER: Brand: AIRSEAL

## (undated) DEVICE — CANNULA ARTHSCP L3CM ID8MM DBL DAM 1 PC MOLD LO PROF FLNG

## (undated) DEVICE — KT MIX/DEL BIOCARTILAGE SM/JNT

## (undated) DEVICE — GLOVE SURG SZ 85 L12IN FNGR ORTHO 126MIL CRM LTX FREE

## (undated) DEVICE — Z DUP USE 2522782 SOLUTION IRRIG 1000ML STRL H2O PLAS CONTAINER UROMATIC

## (undated) DEVICE — GARMENT COMPR STD FOR 17IN CALF UNIF THER FLOTRN

## (undated) DEVICE — GOWN,PREVENTION PLUS,XLN/XL,ST,24/CS: Brand: MEDLINE

## (undated) DEVICE — PAD,EYE,1-5/8X2 5/8,STERILE,LF,1/PK: Brand: MEDLINE

## (undated) DEVICE — BNDG CURAD ADHS 4IN WHT

## (undated) DEVICE — KIT PERC FOR 3.9MM KNOTLESS CORKSCREW

## (undated) DEVICE — SUT VIC 0 SUTUPAK TIES 18IN J906G

## (undated) DEVICE — SPNG GZ WOVN 4X4IN 12PLY 10/BX STRL

## (undated) DEVICE — TRY PLATLT/RICH/PLASM ANGEL ASP BONE MRRW

## (undated) DEVICE — CVR BRD ARM 13X30

## (undated) DEVICE — TUBING PMP IRRIG GOFLO

## (undated) DEVICE — DRESSING TELFA BARRIER ISLE 4 X 6

## (undated) DEVICE — BNDG ELAS ECON W/CLIP 4IN 5YD LF STRL

## (undated) DEVICE — DRESSING TRNSPAR W5XL4.5IN FLM SHT SEMIPERMEABLE WIND

## (undated) DEVICE — SUTURE MCRYL SZ 4-0 L18IN ABSRB UD L19MM PS-2 3/8 CIR PRIM Y496G

## (undated) DEVICE — SOLUTION IV 1000ML 0.9% SOD CHL FOR IRRIG PLAS CONT

## (undated) DEVICE — SUCTION MAT (LOW PROFILE), 50X34: Brand: NEPTUNE

## (undated) DEVICE — GLV SURG SENSICARE PI ORTHO SZ8 LF STRL

## (undated) DEVICE — STRAP ANKL DISTR ARTH 1PT USE

## (undated) DEVICE — SKIN PREP TRAY W/CHG: Brand: MEDLINE INDUSTRIES, INC.

## (undated) DEVICE — SEALANT WND FIBRIN TISSEEL PREFIL/SYR/PRIMAFZ 4ML

## (undated) DEVICE — UNDERCAST PADDING: Brand: DEROYAL

## (undated) DEVICE — SUTURE SZ 0 27IN 5/8 CIR UR-6  TAPER PT VIOLET ABSRB VICRYL J603H

## (undated) DEVICE — Y-TYPE TUR/BLADDER IRRIGATION SET, REGULATING CLAMP

## (undated) DEVICE — CANNULA SEAL

## (undated) DEVICE — AIRSEAL 8 MM ACCESS PORT AND LOW PROFILE OBTURATOR WITH BLADELESS OPTICAL TIP, 120 MM LENGTH: Brand: AIRSEAL

## (undated) DEVICE — VCARE LARGE UTERINE MANIPULATOR: Brand: VCARE LARGE

## (undated) DEVICE — SOLUTION IRRIG 3000ML 0.9% SOD CHL USP UROMATIC PLAS CONT

## (undated) DEVICE — BLD SHVE RESEC SABRE COOLCT SJ 3MM 7CM

## (undated) DEVICE — BNDG ELAS W/CLIP 6IN 10YD LF STRL

## (undated) DEVICE — ARM DRAPE

## (undated) DEVICE — SHOULDER STABILIZATION KIT,                                    DISPOSABLE 12 PER BOX

## (undated) DEVICE — APPL DURAPREP IODOPHOR APL 26ML

## (undated) DEVICE — SUTURE VCRL SZ 0 L36IN ABSRB UD L36MM CT-1 1/2 CIR J946H

## (undated) DEVICE — BLADELESS OBTURATOR: Brand: WECK VISTA

## (undated) DEVICE — ADHESIVE SKIN CLSR 0.7ML TOP DERMBND ADV

## (undated) DEVICE — 3M™ STERI-STRIP™ REINFORCED ADHESIVE SKIN CLOSURES, R1547, 1/2 IN X 4 IN (12 MM X 100 MM), 6 STRIPS/ENVELOPE: Brand: 3M™ STERI-STRIP™

## (undated) DEVICE — SUT ETHLN 3/0 FS1 30IN 669H

## (undated) DEVICE — DRAPE,SHOULDER,BEACH CHAIR,STERILE: Brand: MEDLINE

## (undated) DEVICE — NDL HYPO PRECISIONGLIDE REG 25G 1 1/2

## (undated) DEVICE — SUTURE PDS II SZ 0 L27IN ABSRB VLT L26MM CT-2 1/2 CIR Z334H

## (undated) DEVICE — TBG ARTHRO FLOWSTEADY/ST DISP

## (undated) DEVICE — PATIENT RETURN ELECTRODE, SINGLE-USE, CONTACT QUALITY MONITORING, ADULT, WITH 9FT CORD, FOR PATIENTS WEIGING OVER 33LBS. (15KG): Brand: MEGADYNE

## (undated) DEVICE — TIP COVER ACCESSORY

## (undated) DEVICE — Z INACTIVE USE 2423349 EVICEL RIG LAPSCP AIRLESS SPRY TIP USA

## (undated) DEVICE — INTENDED TO AID IN THE PASSING OF SUTURES THROUGH BONE AND SOFT TISSUE DURING ORTHOPEDIC SURGERY: Brand: HOFFEE SUTURE RETRIEVER

## (undated) DEVICE — GLOVE SURG SZ 85 L12IN FNGR THK79MIL GRN LTX FREE

## (undated) DEVICE — PAD,ARMBOARD,CONV,FOAM,2X8X20",12PR/CS: Brand: MEDLINE

## (undated) DEVICE — DRESSING,GAUZE,PETROLATUM,CURAD,3"X9",ST: Brand: CURAD

## (undated) DEVICE — SOLUTION IRRIG 1000ML H2O PIC PLAS SHATTERPROOF CONTAINER

## (undated) DEVICE — SHEET,DRAPE,53X77,STERILE: Brand: MEDLINE

## (undated) DEVICE — 3M™ IOBAN™ 2 ANTIMICROBIAL INCISE DRAPE 6650EZ: Brand: IOBAN™ 2

## (undated) DEVICE — WEREWOLF FLOW 90 COBLATION WAND: Brand: COBLATION

## (undated) DEVICE — PAD CAST SPECIST 6IN STRL